# Patient Record
Sex: MALE | Race: WHITE | NOT HISPANIC OR LATINO | Employment: OTHER | URBAN - METROPOLITAN AREA
[De-identification: names, ages, dates, MRNs, and addresses within clinical notes are randomized per-mention and may not be internally consistent; named-entity substitution may affect disease eponyms.]

---

## 2017-01-16 ENCOUNTER — TELEPHONE (OUTPATIENT)
Dept: SLEEP MEDICINE | Facility: CLINIC | Age: 67
End: 2017-01-16

## 2017-01-16 NOTE — TELEPHONE ENCOUNTER
I called Mr. Mackey again and reviewed his home sleep study. His AHI was 4/hr. His supine AHI was 17.5/hr. I recommended he try positional restriction and I told him about options of Slumberbump or tennis ball t-shirt. He was advised to get back in touch with me if he is still feeling tired or if his wife is still observing pauses in breathing. We may consider doing an in lab PSG to see if he is having milder events that could not be scored on the HST.   He says he is not as tired as he was when he was last here, but he still has some days where he is tired.  Bennett Goltz, PA-C

## 2017-01-25 ENCOUNTER — TRANSFERRED RECORDS (OUTPATIENT)
Dept: HEALTH INFORMATION MANAGEMENT | Facility: CLINIC | Age: 67
End: 2017-01-25

## 2017-01-25 LAB
ALT SERPL-CCNC: 38 U/L (ref 12–55)
AST SERPL-CCNC: 23 U/L (ref 0–37)
CHOLEST SERPL-MCNC: 187 MG/DL (ref 0–199)
CREAT SERPL-MCNC: 1 MG/DL (ref 0.5–1.3)
GLUCOSE SERPL-MCNC: 120 MG/DL (ref 80–115)
HDLC SERPL-MCNC: 43 MG/DL (ref 40–100)
LDLC SERPL CALC-MCNC: 76 MG/DL
NONHDLC SERPL-MCNC: ABNORMAL MG/DL
POTASSIUM SERPL-SCNC: 4.6 MMOL/L (ref 3.5–5.1)
TRIGL SERPL-MCNC: 328 MG/DL (ref 0–149)
TSH SERPL-ACNC: 10.3 MCU/ML (ref 0.36–3.74)

## 2017-02-10 ENCOUNTER — OFFICE VISIT (OUTPATIENT)
Dept: FAMILY MEDICINE | Facility: CLINIC | Age: 67
End: 2017-02-10
Payer: COMMERCIAL

## 2017-02-10 VITALS
DIASTOLIC BLOOD PRESSURE: 74 MMHG | BODY MASS INDEX: 32.44 KG/M2 | HEIGHT: 69 IN | HEART RATE: 82 BPM | RESPIRATION RATE: 18 BRPM | WEIGHT: 219 LBS | SYSTOLIC BLOOD PRESSURE: 135 MMHG | OXYGEN SATURATION: 95 % | TEMPERATURE: 98 F

## 2017-02-10 DIAGNOSIS — B34.9 VIRAL ILLNESS: Primary | ICD-10-CM

## 2017-02-10 PROCEDURE — 99212 OFFICE O/P EST SF 10 MIN: CPT | Performed by: NURSE PRACTITIONER

## 2017-02-10 RX ORDER — MIRABEGRON 25 MG/1
25 TABLET, FILM COATED, EXTENDED RELEASE ORAL DAILY
COMMUNITY
Start: 2016-12-29 | End: 2017-09-13

## 2017-02-10 NOTE — PROGRESS NOTES
HPI    SUBJECTIVE:                                                    Dionte Mackey is a 66 year old male who presents to clinic today for the following health issues:      Got a t-tube 1 year ago and had ear reconstruction surgery about 4 months ago   Sunday had a flight and felt like a cold was coming on but it improved the following days where he felt good yesterday and the day before. Then last night started to get more sinus and ear congestion   Mild occasional cough  Nasal drainage   No fevers, body aches       Past Medical History   Diagnosis Date     BPH (benign prostatic hyperplasia)      Depression      Hypertension      Non morbid obesity due to excess calories 11/9/2016     Recurrent major depressive disorder, in full remission (H) 11/9/2016     Uncomplicated asthma      Past Surgical History   Procedure Laterality Date     Back surgery       cervical fusion,neck fx repair     Genitourinary surgery       TURP IN 2010     Ent surgery       SINUS SURGERY X 3,SEPTOPLASTY,TONSILLECTOMY     Tonsillectomy       Foot surgery Right      Colonoscopy       Cystoscopy, transurethral resection (tur) prostate, combined N/A 4/3/2015     Procedure: COMBINED CYSTOSCOPY, TRANSURETHRAL RESECTION (TUR) PROSTATE;  Surgeon: Bakari Barrios MD;  Location:  OR     Social History   Substance Use Topics     Smoking status: Never Smoker     Smokeless tobacco: Never Used     Alcohol use 1.8 - 2.4 oz/week     3 - 4 Standard drinks or equivalent per week      Comment: 3 drinks on weekend days     Current Outpatient Prescriptions   Medication Sig Dispense Refill     MYRBETRIQ 25 MG 24 hr tablet Take 25 mg by mouth daily       lisinopril (PRINIVIL,ZESTRIL) 30 MG tablet Take 1 tablet (30 mg) by mouth daily 90 tablet 3     sertraline (ZOLOFT) 25 MG tablet Take 4 tablets (100 mg) by mouth 2 times daily 180 tablet 3     hydrochlorothiazide (HYDRODIURIL) 25 MG tablet Take 1 tablet (25 mg) by mouth daily 90 tablet 3     buPROPion  "(WELLBUTRIN XL) 150 MG 24 hr tablet Take 150 mg by mouth every morning       albuterol (PROAIR HFA, PROVENTIL HFA, VENTOLIN HFA) 108 (90 BASE) MCG/ACT inhaler Inhale 2 puffs into the lungs every 6 hours       mometasone-formoterol (DULERA) 200-5 MCG/ACT oral inhaler Inhale 2 puffs into the lungs 2 times daily       budesonide (PULMICORT) 0.5 MG/2ML nebulizer solution Take 0.5 mg by nebulization daily       mupirocin (BACTROBAN) 2 % ointment        Sildenafil Citrate (VIAGRA PO) Take 100 mg by mouth as needed       Allergies   Allergen Reactions     Ciprofloxacin Shortness Of Breath, Itching and Difficulty breathing     Moxifloxacin Hives       Reviewed PMH, med list and allergies.      ROS  Detailed as above       /74 mmHg  Pulse 82  Temp(Src) 98  F (36.7  C) (Oral)  Resp 18  Ht 5' 9\" (1.753 m)  Wt 219 lb (99.338 kg)  BMI 32.33 kg/m2  SpO2 95%      Physical Exam   Constitutional: He is well-developed, well-nourished, and in no distress.   HENT:   Head: Normocephalic.   Right Ear: Tympanic membrane, external ear and ear canal normal.   Left Ear: Tympanic membrane, external ear and ear canal normal.   Mouth/Throat: Oropharynx is clear and moist. No oropharyngeal exudate.   Eyes: Conjunctivae are normal.   Neck: Normal range of motion.   Pulmonary/Chest: Effort normal and breath sounds normal. No respiratory distress.   Lymphadenopathy:     He has no cervical adenopathy.   Neurological: He is alert.   Skin: Skin is warm and dry.   Psychiatric: Mood and affect normal.   Vitals reviewed.      Assessment and Plan:       ICD-10-CM    1. Viral illness B34.9        URI symptoms for less than 24 hours  Will continue to watch and wait   Symptomatic treatments   Call or rtc in 2-3 weeks with no improvement       GIACOMO Dent, CNP  [facility]    "

## 2017-02-10 NOTE — NURSING NOTE
"Chief Complaint   Patient presents with     Cough     Otalgia     Sinus Problem       Initial /74 mmHg  Pulse 82  Temp(Src) 98  F (36.7  C) (Oral)  Resp 18  Ht 5' 9\" (1.753 m)  Wt 219 lb (99.338 kg)  BMI 32.33 kg/m2  SpO2 95% Estimated body mass index is 32.33 kg/(m^2) as calculated from the following:    Height as of this encounter: 5' 9\" (1.753 m).    Weight as of this encounter: 219 lb (99.338 kg).  BP taken with regular cuff.  Medication Reconciliation: complete   MYA Childress CMA February 10, 2017 12:06 PM    "

## 2017-02-10 NOTE — MR AVS SNAPSHOT
"              After Visit Summary   2/10/2017    Dionte Mackey    MRN: [id number]           Patient Information     Date Of Birth          1950        Visit Information        Provider Department      2/10/2017 12:00 PM Annmarie Silva APRN CNP [facility]        Today's Diagnoses     Viral illness    -  1       Follow-ups after your visit        Who to contact     If you have questions or need follow up information about today's clinic visit or your schedule please contact [facility] directly at [phone].  Normal or non-critical lab and imaging results will be communicated to you by [employer] Inchart, letter or phone within 4 business days after the clinic has received the results. If you do not hear from us within 7 days, please contact the clinic through [employer] Inchart or phone. If you have a critical or abnormal lab result, we will notify you by phone as soon as possible.  Submit refill requests through [employer] or call your pharmacy and they will forward the refill request to us. Please allow 3 business days for your refill to be completed.          Additional Information About Your Visit        MyChart Information     [employer] lets you send messages to your doctor, view your test results, renew your prescriptions, schedule appointments and more. To sign up, go to [email or web address] . Click on \"Log in\" on the left side of the screen, which will take you to the Welcome page. Then click on \"Sign up Now\" on the right side of the page.     You will be asked to enter the access code listed below, as well as some personal information. Please follow the directions to create your username and password.     Your access code is: [email or web address]  Expires: 2017 10:13 AM     Your access code will  in 90 days. If you need help or a new code, please call your [facility] or [phone].        Care EveryWhere ID     This is your Care EveryWhere ID. This could be used by other " "organizations to access your [place] medical records  [id number]        Your Vitals Were     Pulse Temperature Respirations Height Pulse Oximetry BMI (Body Mass Index)    82 98  F (36.7  C) (Oral) 18 5' 9\" (1.753 m) 95% 32.34 kg/m2       Blood Pressure from Last 3 Encounters:   02/10/17 135/74   11/18/16 124/72   11/09/16 127/81    Weight from Last 3 Encounters:   02/10/17 219 lb (99.3 kg)   11/18/16 218 lb 6.4 oz (99.1 kg)   11/09/16 220 lb (99.8 kg)              Today, you had the following     No orders found for display       Primary Care Provider Office Phone # Fax #    Bakari Barrios [phone]       [facility] [address] [facility] [address]  [facility] [address]        Thank you!     Thank you for choosing [facility]  for your care. Our goal is always to provide you with excellent care. Hearing back from our patients is one way we can continue to improve our services. Please take a few minutes to complete the written survey that you may receive in the mail after your visit with us. Thank you!             Your Updated Medication List - Protect others around you: Learn how to safely use, store and throw away your medicines at www.disposemymeds.org.          This list is accurate as of: 2/10/17 11:59 PM.  Always use your most recent med list.                   Brand Name Dispense Instructions for use    albuterol 108 (90 BASE) MCG/ACT Inhaler    PROAIR HFA/PROVENTIL HFA/VENTOLIN HFA     Inhale 2 puffs into the lungs every 6 hours       budesonide 0.5 MG/2ML neb solution    PULMICORT     Take 0.5 mg by nebulization daily       buPROPion 150 MG 24 hr tablet    WELLBUTRIN XL     Take 150 mg by mouth every morning       hydrochlorothiazide 25 MG tablet    HYDRODIURIL    90 tablet    Take 1 tablet (25 mg) by mouth daily       lisinopril 30 MG tablet    PRINIVIL,ZESTRIL    90 tablet    Take 1 tablet (30 mg) by mouth daily       mometasone-formoterol 200-5 MCG/ACT oral inhaler    " DULERA     Inhale 2 puffs into the lungs 2 times daily       mupirocin 2 % ointment    BACTROBAN         MYRBETRIQ 25 MG 24 hr tablet   Generic drug:  mirabegron      Take 25 mg by mouth daily       sertraline 25 MG tablet    ZOLOFT    180 tablet    Take 4 tablets (100 mg) by mouth 2 times daily       VIAGRA PO      Take 100 mg by mouth as needed

## 2017-02-11 ASSESSMENT — ASTHMA QUESTIONNAIRES: ACT_TOTALSCORE: 22

## 2017-02-22 ENCOUNTER — OFFICE VISIT (OUTPATIENT)
Dept: FAMILY MEDICINE | Facility: CLINIC | Age: 67
End: 2017-02-22
Payer: COMMERCIAL

## 2017-02-22 VITALS
HEIGHT: 69 IN | RESPIRATION RATE: 22 BRPM | HEART RATE: 92 BPM | DIASTOLIC BLOOD PRESSURE: 79 MMHG | WEIGHT: 214 LBS | OXYGEN SATURATION: 92 % | BODY MASS INDEX: 31.7 KG/M2 | SYSTOLIC BLOOD PRESSURE: 144 MMHG | TEMPERATURE: 98.3 F

## 2017-02-22 DIAGNOSIS — J06.9 UPPER RESPIRATORY TRACT INFECTION, UNSPECIFIED TYPE: Primary | ICD-10-CM

## 2017-02-22 PROCEDURE — 99212 OFFICE O/P EST SF 10 MIN: CPT | Performed by: NURSE PRACTITIONER

## 2017-02-22 RX ORDER — AZITHROMYCIN 250 MG/1
TABLET, FILM COATED ORAL
COMMUNITY
Start: 2017-02-20 | End: 2017-09-13

## 2017-02-22 RX ORDER — BENZONATATE 200 MG/1
200 CAPSULE ORAL
COMMUNITY
Start: 2017-02-20 | End: 2017-09-13

## 2017-02-22 RX ORDER — CEFUROXIME AXETIL 500 MG/1
500 TABLET ORAL
COMMUNITY
Start: 2017-02-20 | End: 2017-03-02

## 2017-02-22 RX ORDER — HYDROCODONE BITARTRATE AND ACETAMINOPHEN 5; 325 MG/1; MG/1
1 TABLET ORAL
COMMUNITY
Start: 2017-02-18 | End: 2017-09-13

## 2017-02-22 RX ORDER — PREDNISONE 20 MG/1
40 TABLET ORAL
COMMUNITY
Start: 2017-02-18 | End: 2017-02-23

## 2017-02-22 NOTE — PROGRESS NOTES
HPI    SUBJECTIVE:                                                    Dionte Mackey is a 66 year old male who presents to clinic today for the following health issues:    RESPIRATORY SYMPTOMS      Duration: 2+ weeks    Description  nasal congestion, cough, wheezing and SOB    Severity: moderate    Accompanying signs and symptoms: Body aches; No fever; Chest pain with coughing    History (predisposing factors):  asthma    Precipitating or alleviating factors: None    Therapies tried and outcome:  Treated for Pneumonia       Last neb today at 10am   Went to ENT   2/20 started zpak, just a couple days ago   Finishing prednisone tomorrow   nonproductive  Taking tessalon with some improvement       Past Medical History   Diagnosis Date     BPH (benign prostatic hyperplasia)      Depression      Hypertension      Non morbid obesity due to excess calories 11/9/2016     Recurrent major depressive disorder, in full remission (H) 11/9/2016     Uncomplicated asthma      Past Surgical History   Procedure Laterality Date     Back surgery       cervical fusion,neck fx repair     Genitourinary surgery       TURP IN 2010     Ent surgery       SINUS SURGERY X 3,SEPTOPLASTY,TONSILLECTOMY     Tonsillectomy       Foot surgery Right      Colonoscopy       Cystoscopy, transurethral resection (tur) prostate, combined N/A 4/3/2015     Procedure: COMBINED CYSTOSCOPY, TRANSURETHRAL RESECTION (TUR) PROSTATE;  Surgeon: Bakari Barrios MD;  Location:  OR     Social History   Substance Use Topics     Smoking status: Never Smoker     Smokeless tobacco: Never Used     Alcohol use 1.8 - 2.4 oz/week     3 - 4 Standard drinks or equivalent per week      Comment: 3 drinks on weekend days     Current Outpatient Prescriptions   Medication Sig Dispense Refill     azithromycin (ZITHROMAX) 250 MG tablet Take one tablet daily for 4 days. First dose of 500 mg given in emergency department.       cefUROXime (CEFTIN) 500 MG tablet Take 500 mg by mouth    "    benzonatate (TESSALON) 200 MG capsule Take 200 mg by mouth       HYDROcodone-acetaminophen (NORCO) 5-325 MG per tablet Take 1 tablet by mouth       MYRBETRIQ 25 MG 24 hr tablet Take 25 mg by mouth daily       lisinopril (PRINIVIL,ZESTRIL) 30 MG tablet Take 1 tablet (30 mg) by mouth daily 90 tablet 3     sertraline (ZOLOFT) 25 MG tablet Take 4 tablets (100 mg) by mouth 2 times daily 180 tablet 3     hydrochlorothiazide (HYDRODIURIL) 25 MG tablet Take 1 tablet (25 mg) by mouth daily 90 tablet 3     buPROPion (WELLBUTRIN XL) 150 MG 24 hr tablet Take 150 mg by mouth every morning       albuterol (PROAIR HFA, PROVENTIL HFA, VENTOLIN HFA) 108 (90 BASE) MCG/ACT inhaler Inhale 2 puffs into the lungs every 6 hours       mometasone-formoterol (DULERA) 200-5 MCG/ACT oral inhaler Inhale 2 puffs into the lungs 2 times daily       budesonide (PULMICORT) 0.5 MG/2ML nebulizer solution Take 0.5 mg by nebulization daily       mupirocin (BACTROBAN) 2 % ointment        Sildenafil Citrate (VIAGRA PO) Take 100 mg by mouth as needed       Allergies   Allergen Reactions     Ciprofloxacin Shortness Of Breath, Itching and Difficulty breathing     Moxifloxacin Hives       Reviewed PMH, med list and allergies.      ROS  Detailed as above       /79 (BP Location: Right arm, Patient Position: Right side, Cuff Size: Adult Large)  Pulse 92  Temp 98.3  F (36.8  C) (Tympanic)  Resp 22  Ht 5' 9\" (1.753 m)  Wt 214 lb (97.1 kg)  SpO2 92%  BMI 31.6 kg/m2      Physical Exam   Constitutional: He is well-developed, well-nourished, and in no distress.   HENT:   Head: Normocephalic.   Right Ear: Tympanic membrane, external ear and ear canal normal.   Left Ear: Tympanic membrane, external ear and ear canal normal.   Mouth/Throat: Oropharynx is clear and moist. No oropharyngeal exudate.   Eyes: Conjunctivae are normal.   Neck: Normal range of motion.   Cardiovascular: Normal rate, regular rhythm and normal heart sounds.    No murmur " heard.  Pulmonary/Chest: Effort normal and breath sounds normal. No respiratory distress.   Lymphadenopathy:     He has no cervical adenopathy.   Neurological: He is alert.   Skin: Skin is warm and dry.   Psychiatric: Mood and affect normal.   Vitals reviewed.        Assessment and Plan:       ICD-10-CM    1. Upper respiratory tract infection, unspecified type J06.9        Already on abx and prednisone  Will not change his treatment   Instructed on Symptomatic treatments   Told this will likely continue to improve slowly and last possibly another couple weeks   Call or rtc with worsening symptoms       GIACOMO Dent, CNP  [facility]

## 2017-02-22 NOTE — NURSING NOTE
"Chief Complaint   Patient presents with     URI       Initial /79 (BP Location: Right arm, Patient Position: Right side, Cuff Size: Adult Large)  Pulse 92  Temp 98.3  F (36.8  C) (Tympanic)  Resp 22  Ht 5' 9\" (1.753 m)  Wt 214 lb (97.1 kg)  SpO2 92%  BMI 31.6 kg/m2 Estimated body mass index is 31.6 kg/(m^2) as calculated from the following:    Height as of this encounter: 5' 9\" (1.753 m).    Weight as of this encounter: 214 lb (97.1 kg).  Medication Reconciliation: complete   Zahira Rodriguez CMA (AAMA)      "

## 2017-02-22 NOTE — MR AVS SNAPSHOT
"              After Visit Summary   2017    Dionte Mackey    MRN: [id number]           Patient Information     Date Of Birth          1950        Visit Information        Provider Department      2017 1:00 PM Annmarie Silva APRN CNP [facility]        Today's Diagnoses     Upper respiratory tract infection, unspecified type    -  1       Follow-ups after your visit        Who to contact     If you have questions or need follow up information about today's clinic visit or your schedule please contact [facility] directly at [phone].  Normal or non-critical lab and imaging results will be communicated to you by [employer] TV[employer], letter or phone within 4 business days after the clinic has received the results. If you do not hear from us within 7 days, please contact the clinic through [employer] TV[employer] or phone. If you have a critical or abnormal lab result, we will notify you by phone as soon as possible.  Submit refill requests through [employer] or call your pharmacy and they will forward the refill request to us. Please allow 3 business days for your refill to be completed.          Additional Information About Your Visit        MyChart Information     [employer] lets you send messages to your doctor, view your test results, renew your prescriptions, schedule appointments and more. To sign up, go to [email or web address] . Click on \"Log in\" on the left side of the screen, which will take you to the Welcome page. Then click on \"Sign up Now\" on the right side of the page.     You will be asked to enter the access code listed below, as well as some personal information. Please follow the directions to create your username and password.     Your access code is: [email or web address]  Expires: 2017 10:13 AM     Your access code will  in 90 days. If you need help or a new code, please call your [facility] or [phone].        Care EveryWhere ID     This is your Care EveryWhere ID. " "This could be used by other organizations to access your [place] medical records  [id number]        Your Vitals Were     Pulse Temperature Respirations Height Pulse Oximetry BMI (Body Mass Index)    92 98.3  F (36.8  C) (Tympanic) 22 5' 9\" (1.753 m) 92% 31.6 kg/m2       Blood Pressure from Last 3 Encounters:   02/22/17 144/79   02/10/17 135/74   11/18/16 124/72    Weight from Last 3 Encounters:   02/22/17 214 lb (97.1 kg)   02/10/17 219 lb (99.3 kg)   11/18/16 218 lb 6.4 oz (99.1 kg)              Today, you had the following     No orders found for display       Primary Care Provider Office Phone # Fax #    Bakari Barrios [phone]       [facility] [address] [facility] [address] [address]        Thank you!     Thank you for choosing [facility]  for your care. Our goal is always to provide you with excellent care. Hearing back from our patients is one way we can continue to improve our services. Please take a few minutes to complete the written survey that you may receive in the mail after your visit with us. Thank you!             Your Updated Medication List - Protect others around you: Learn how to safely use, store and throw away your medicines at www.disposemymeds.org.          This list is accurate as of: 2/22/17 11:59 PM.  Always use your most recent med list.                   Brand Name Dispense Instructions for use    albuterol 108 (90 BASE) MCG/ACT Inhaler    PROAIR HFA/PROVENTIL HFA/VENTOLIN HFA     Inhale 2 puffs into the lungs every 6 hours       azithromycin 250 MG tablet    ZITHROMAX     Take one tablet daily for 4 days. First dose of 500 mg given in emergency department.       benzonatate 200 MG capsule    TESSALON     Take 200 mg by mouth       budesonide 0.5 MG/2ML neb solution    PULMICORT     Take 0.5 mg by nebulization daily       buPROPion 150 MG 24 hr tablet    WELLBUTRIN XL     Take 150 mg by mouth every morning       cefUROXime 500 MG " tablet    CEFTIN     Take 500 mg by mouth       hydrochlorothiazide 25 MG tablet    HYDRODIURIL    90 tablet    Take 1 tablet (25 mg) by mouth daily       HYDROcodone-acetaminophen 5-325 MG per tablet    NORCO     Take 1 tablet by mouth       lisinopril 30 MG tablet    PRINIVIL,ZESTRIL    90 tablet    Take 1 tablet (30 mg) by mouth daily       mometasone-formoterol 200-5 MCG/ACT oral inhaler    DULERA     Inhale 2 puffs into the lungs 2 times daily       mupirocin 2 % ointment    BACTROBAN         MYRBETRIQ 25 MG 24 hr tablet   Generic drug:  mirabegron      Take 25 mg by mouth daily       predniSONE 20 MG tablet    DELTASONE     Take 40 mg by mouth       sertraline 25 MG tablet    ZOLOFT    180 tablet    Take 4 tablets (100 mg) by mouth 2 times daily       VIAGRA PO      Take 100 mg by mouth as needed

## 2017-09-06 ENCOUNTER — TRANSFERRED RECORDS (OUTPATIENT)
Dept: HEALTH INFORMATION MANAGEMENT | Facility: CLINIC | Age: 67
End: 2017-09-06

## 2017-09-12 NOTE — PATIENT INSTRUCTIONS
Preventive Health Recommendations:       Male Ages 65 and over    Yearly exam:             See your health care provider every year in order to  o   Review health changes.   o   Discuss preventive care.    o   Review your medicines if your doctor has prescribed any.    Talk with your health care provider about whether you should have a test to screen for prostate cancer (PSA).    Every 3 years, have a diabetes test (fasting glucose). If you are at risk for diabetes, you should have this test more often.    Every 5 years, have a cholesterol test. Have this test more often if you are at risk for high cholesterol or heart disease.     Every 10 years, have a colonoscopy. Or, have a yearly FIT test (stool test). These exams will check for colon cancer.    Talk to with your health care provider about screening for Abdominal Aortic Aneurysm if you have a family history of AAA or have a history of smoking.  Shots:     Get a flu shot each year.     Get a tetanus shot every 10 years.     Talk to your doctor about your pneumonia vaccines. There are now two you should receive - Pneumovax (PPSV 23) and Prevnar (PCV 13).    Talk to your doctor about a shingles vaccine.     Talk to your doctor about the hepatitis B vaccine.  Nutrition:     Eat at least 5 servings of fruits and vegetables each day.     Eat whole-grain bread, whole-wheat pasta and brown rice instead of white grains and rice.     Talk to your doctor about Calcium and Vitamin D.   Lifestyle    Exercise for at least 150 minutes a week (30 minutes a day, 5 days a week). This will help you control your weight and prevent disease.     Limit alcohol to one drink per day.     No smoking.     Wear sunscreen to prevent skin cancer.     See your dentist every six months for an exam and cleaning.     See your eye doctor every 1 to 2 years to screen for conditions such as glaucoma, macular degeneration and cataracts.  Preventive Health Recommendations:       Male Ages 65 and  over    Yearly exam:             See your health care provider every year in order to  o   Review health changes.   o   Discuss preventive care.    o   Review your medicines if your doctor has prescribed any.  Talk with your health care provider about whether you should have a test to screen for prostate cancer (PSA).  Every 3 years, have a diabetes test (fasting glucose). If you are at risk for diabetes, you should have this test more often.  Every 5 years, have a cholesterol test. Have this test more often if you are at risk for high cholesterol or heart disease.   Every 10 years, have a colonoscopy. Or, have a yearly FIT test (stool test). These exams will check for colon cancer.  Talk to with your health care provider about screening for Abdominal Aortic Aneurysm if you have a family history of AAA or have a history of smoking.  Shots:   Get a flu shot each year.   Get a tetanus shot every 10 years.   Talk to your doctor about your pneumonia vaccines. There are now two you should receive - Pneumovax (PPSV 23) and Prevnar (PCV 13).  Talk to your doctor about a shingles vaccine.   Talk to your doctor about the hepatitis B vaccine.  Nutrition:   Eat at least 5 servings of fruits and vegetables each day.   Eat whole-grain bread, whole-wheat pasta and brown rice instead of white grains and rice.   Talk to your doctor about Calcium and Vitamin D.   Lifestyle  Exercise for at least 150 minutes a week (30 minutes a day, 5 days a week). This will help you control your weight and prevent disease.   Limit alcohol to one drink per day.   No smoking.   Wear sunscreen to prevent skin cancer.   See your dentist every six months for an exam and cleaning.   See your eye doctor every 1 to 2 years to screen for conditions such as glaucoma, macular degeneration and cataracts.

## 2017-09-12 NOTE — PROGRESS NOTES
"  SUBJECTIVE:   Dionte Mackey is a 67 year old male who presents for Preventive Visit.  {PVP to remind patient that this is not necessarily a physical exam; physical exam may or may not be done:156440::\"click delete button to remove this line now\"}  {PVP to inform patient that additional E&M charge may apply, if additional problems addressed:832994::\"click delete button to remove this line now\"}  Are you in the first 12 months of your Medicare Part B coverage?  {No Yes:716917::\"No\"}    Healthy Habits:    Do you get at least three servings of calcium containing foods daily (dairy, green leafy vegetables, etc.)? {YES/NO, DAIRY INTAKE:379533::\"yes\"}    Amount of exercise or daily activities, outside of work: {AMOUNT EXERCISE:648657}    Problems taking medications regularly {Yes /No default:302827::\"No\"}    Medication side effects: {Yes /No default.:973297::\"No\"}    Have you had an eye exam in the past two years? {YESNOBLANK:424331}    Do you see a dentist twice per year? {YESNOBLANK:373555}    Do you have sleep apnea, excessive snoring or daytime drowsiness?{YESNOBLANK:060043}    {AWV Cognitive Screenin}    {Outside tests to abstract? :742696}    {additional problems to add (Optional):549767}    Reviewed and updated as needed this visit by clinical staff         Reviewed and updated as needed this visit by Provider        Social History   Substance Use Topics     Smoking status: Never Smoker     Smokeless tobacco: Never Used     Alcohol use 1.8 - 2.4 oz/week     3 - 4 Standard drinks or equivalent per week      Comment: 3 drinks on weekend days       {ETOH AUDIT:184576}    Today's PHQ-2 Score:   PHQ-2 (  [employer]) 2016   Q1: Little interest or pleasure in doing things 0   Q2: Feeling down, depressed or hopeless 0   PHQ-2 Score 0     {PHQ-2 LOOK IN ASSESSMENTS (Optional) :996829}  Do you feel safe in your environment - {YES/NO/NA:226522}    Do you have a Health Care Directive?: {HEALTHCARE DIRECTIVE " "STATUS:966448}    Current providers sharing in care for this patient include:   Patient Care Team:  Romeo Fonseca MD as PCP - General (Internal Medicine)      Hearing impairment: {NO/YES:619935}    Ability to successfully perform activities of daily living: {YES/NO (MEDICARE):455522::\"Yes, no assistance needed\"}     Fall risk:  {Document Fall Risk in the Assessments Section of the Navigator:189233}    Home safety:  {IPPE SAFETY CONCERNS:212043::\"none identified\"}  {If any of the above assessments are answered yes, consider ordering appropriate referrals (Optional):408660::\"click delete button to remove this line now\"}    The following health maintenance items are reviewed in Epic and correct as of today:  Health Maintenance   Topic Date Due     ASTHMA ACTION PLAN Q1 YR  1955     HEPATITIS C SCREENING  1968     COLON CANCER SCREEN (SYSTEM ASSIGNED)  2000     ADVANCE DIRECTIVE PLANNING Q5 YRS  2005     AORTIC ANEURYSM SCREENING (SYSTEM ASSIGNED)  2015     PNEUMOCOCCAL (2 of 2 - PPSV23) 2017     ASTHMA CONTROL TEST Q6 MOS  08/10/2017     INFLUENZA VACCINE (SYSTEM ASSIGNED)  2017     FALL RISK ASSESSMENT  2017     LIPID SCREEN Q5 YR MALE (SYSTEM ASSIGNED)  2022     TETANUS IMMUNIZATION (SYSTEM ASSIGNED)  2023     {Chronicprobdata (Optional):125698}    {Decision Support (Optional):239476}    ROS:  {ROS COMP:195264}    OBJECTIVE:   There were no vitals taken for this visit. Estimated body mass index is 31.6 kg/(m^2) as calculated from the following:    Height as of 17: 5' 9\" (1.753 m).    Weight as of 17: 214 lb (97.1 kg).  EXAM:   {Exam :624541}    ASSESSMENT / PLAN:   {Diag Picklist:329564}    End of Life Planning:  Patient currently has an advanced directive: { :720114}    COUNSELING:  {Medicare Counselin}    {BP Counseling- Complete if BP >= 120/80  (Optional):442904}    Estimated body mass index is 31.6 kg/(m^2) as calculated from the " "following:    Height as of 2/22/17: 5' 9\" (1.753 m).    Weight as of 2/22/17: 214 lb (97.1 kg).  {Weight Management Plan -- Complete if patient has an abnormal BMI (Optional):056642}   reports that he has never smoked. He has never used smokeless tobacco.  {Tobacco Cessation -- Complete if patient is a smoker (Optional):780641}    Appropriate preventive services were discussed with this patient, including applicable screening as appropriate for cardiovascular disease, diabetes, osteopenia/osteoporosis, and glaucoma.  As appropriate for age/gender, discussed screening for colorectal cancer, prostate cancer, breast cancer, and cervical cancer. Checklist reviewing preventive services available has been given to the patient.    Reviewed patients plan of care and provided an AVS. The {CarePlan:818912} for Dionte meets the Care Plan requirement. This Care Plan has been established and reviewed with the {PATIENT, FAMILY MEMBER, CAREGIVER:601272}.    Counseling Resources:  ATP IV Guidelines  Pooled Cohorts Equation Calculator  Breast Cancer Risk Calculator  FRAX Risk Assessment  ICSI Preventive Guidelines  Dietary Guidelines for Americans, 2010  USDA's MyPlate  ASA Prophylaxis  Lung CA Screening    Romeo Fonseca MD  [facility]  "

## 2017-09-13 ENCOUNTER — OFFICE VISIT (OUTPATIENT)
Dept: FAMILY MEDICINE | Facility: CLINIC | Age: 67
End: 2017-09-13
Payer: COMMERCIAL

## 2017-09-13 VITALS
SYSTOLIC BLOOD PRESSURE: 117 MMHG | WEIGHT: 208 LBS | TEMPERATURE: 97.3 F | BODY MASS INDEX: 30.81 KG/M2 | OXYGEN SATURATION: 95 % | DIASTOLIC BLOOD PRESSURE: 67 MMHG | HEIGHT: 69 IN | HEART RATE: 75 BPM

## 2017-09-13 DIAGNOSIS — I10 BENIGN ESSENTIAL HYPERTENSION: ICD-10-CM

## 2017-09-13 DIAGNOSIS — Z23 NEED FOR PROPHYLACTIC VACCINATION AND INOCULATION AGAINST INFLUENZA: ICD-10-CM

## 2017-09-13 DIAGNOSIS — J45.40 MODERATE PERSISTENT ASTHMA WITHOUT COMPLICATION: ICD-10-CM

## 2017-09-13 DIAGNOSIS — Z12.11 SCREEN FOR COLON CANCER: ICD-10-CM

## 2017-09-13 DIAGNOSIS — Z12.5 SCREENING FOR PROSTATE CANCER: ICD-10-CM

## 2017-09-13 DIAGNOSIS — Z23 NEED FOR PROPHYLACTIC VACCINATION AGAINST STREPTOCOCCUS PNEUMONIAE (PNEUMOCOCCUS): ICD-10-CM

## 2017-09-13 DIAGNOSIS — E78.5 HYPERLIPIDEMIA LDL GOAL <130: ICD-10-CM

## 2017-09-13 DIAGNOSIS — F33.42 RECURRENT MAJOR DEPRESSIVE DISORDER, IN FULL REMISSION (H): ICD-10-CM

## 2017-09-13 DIAGNOSIS — Z00.00 ROUTINE GENERAL MEDICAL EXAMINATION AT A HEALTH CARE FACILITY: Primary | ICD-10-CM

## 2017-09-13 DIAGNOSIS — Z13.6 ENCOUNTER FOR ABDOMINAL AORTIC ANEURYSM (AAA) SCREENING: ICD-10-CM

## 2017-09-13 DIAGNOSIS — Z11.59 NEED FOR HEPATITIS C SCREENING TEST: ICD-10-CM

## 2017-09-13 DIAGNOSIS — E03.9 HYPOTHYROIDISM, UNSPECIFIED TYPE: ICD-10-CM

## 2017-09-13 DIAGNOSIS — N32.81 OAB (OVERACTIVE BLADDER): ICD-10-CM

## 2017-09-13 LAB
ALBUMIN SERPL-MCNC: 3.9 G/DL (ref 3.4–5)
ALP SERPL-CCNC: 84 U/L (ref 40–150)
ALT SERPL W P-5'-P-CCNC: 36 U/L (ref 0–70)
ANION GAP SERPL CALCULATED.3IONS-SCNC: 7 MMOL/L (ref 3–14)
AST SERPL W P-5'-P-CCNC: 22 U/L (ref 0–45)
BILIRUB SERPL-MCNC: 0.4 MG/DL (ref 0.2–1.3)
BUN SERPL-MCNC: 17 MG/DL (ref 7–30)
CALCIUM SERPL-MCNC: 9 MG/DL (ref 8.5–10.1)
CHLORIDE SERPL-SCNC: 105 MMOL/L (ref 94–109)
CHOLEST SERPL-MCNC: 179 MG/DL
CO2 SERPL-SCNC: 24 MMOL/L (ref 20–32)
CREAT SERPL-MCNC: 0.91 MG/DL (ref 0.66–1.25)
ERYTHROCYTE [DISTWIDTH] IN BLOOD BY AUTOMATED COUNT: 13.8 % (ref 10–15)
GFR SERPL CREATININE-BSD FRML MDRD: 83 ML/MIN/1.7M2
GLUCOSE SERPL-MCNC: 115 MG/DL (ref 70–99)
HCT VFR BLD AUTO: 40.3 % (ref 40–53)
HCV AB SERPL QL IA: NONREACTIVE
HDLC SERPL-MCNC: 35 MG/DL
HGB BLD-MCNC: 14 G/DL (ref 13.3–17.7)
LDLC SERPL CALC-MCNC: 82 MG/DL
MCH RBC QN AUTO: 30.9 PG (ref 26.5–33)
MCHC RBC AUTO-ENTMCNC: 34.7 G/DL (ref 31.5–36.5)
MCV RBC AUTO: 89 FL (ref 78–100)
NONHDLC SERPL-MCNC: 144 MG/DL
PLATELET # BLD AUTO: 226 10E9/L (ref 150–450)
POTASSIUM SERPL-SCNC: 4.1 MMOL/L (ref 3.4–5.3)
PROT SERPL-MCNC: 7.3 G/DL (ref 6.8–8.8)
PSA SERPL-ACNC: 3.4 UG/L (ref 0–4)
RBC # BLD AUTO: 4.53 10E12/L (ref 4.4–5.9)
SODIUM SERPL-SCNC: 136 MMOL/L (ref 133–144)
T4 FREE SERPL-MCNC: 0.84 NG/DL (ref 0.76–1.46)
TRIGL SERPL-MCNC: 308 MG/DL
TSH SERPL DL<=0.005 MIU/L-ACNC: 4.54 MU/L (ref 0.4–4)
WBC # BLD AUTO: 5.6 10E9/L (ref 4–11)

## 2017-09-13 PROCEDURE — 86803 HEPATITIS C AB TEST: CPT | Performed by: INTERNAL MEDICINE

## 2017-09-13 PROCEDURE — G0009 ADMIN PNEUMOCOCCAL VACCINE: HCPCS | Performed by: INTERNAL MEDICINE

## 2017-09-13 PROCEDURE — 84439 ASSAY OF FREE THYROXINE: CPT | Performed by: INTERNAL MEDICINE

## 2017-09-13 PROCEDURE — 80053 COMPREHEN METABOLIC PANEL: CPT | Performed by: INTERNAL MEDICINE

## 2017-09-13 PROCEDURE — G0103 PSA SCREENING: HCPCS | Performed by: INTERNAL MEDICINE

## 2017-09-13 PROCEDURE — 99213 OFFICE O/P EST LOW 20 MIN: CPT | Mod: 25 | Performed by: INTERNAL MEDICINE

## 2017-09-13 PROCEDURE — 84443 ASSAY THYROID STIM HORMONE: CPT | Performed by: INTERNAL MEDICINE

## 2017-09-13 PROCEDURE — 85027 COMPLETE CBC AUTOMATED: CPT | Performed by: INTERNAL MEDICINE

## 2017-09-13 PROCEDURE — 90732 PPSV23 VACC 2 YRS+ SUBQ/IM: CPT | Performed by: INTERNAL MEDICINE

## 2017-09-13 PROCEDURE — G0439 PPPS, SUBSEQ VISIT: HCPCS | Performed by: INTERNAL MEDICINE

## 2017-09-13 PROCEDURE — 80061 LIPID PANEL: CPT | Performed by: INTERNAL MEDICINE

## 2017-09-13 PROCEDURE — 36415 COLL VENOUS BLD VENIPUNCTURE: CPT | Performed by: INTERNAL MEDICINE

## 2017-09-13 RX ORDER — ALBUTEROL SULFATE 90 UG/1
2 AEROSOL, METERED RESPIRATORY (INHALATION) EVERY 6 HOURS
Qty: 1 INHALER | Refills: 11 | Status: SHIPPED | OUTPATIENT
Start: 2017-09-13 | End: 2019-01-25

## 2017-09-13 RX ORDER — BUPROPION HYDROCHLORIDE 150 MG/1
150 TABLET ORAL EVERY MORNING
Qty: 90 TABLET | Refills: 3 | Status: SHIPPED | OUTPATIENT
Start: 2017-09-13 | End: 2018-06-01

## 2017-09-13 RX ORDER — HYDROCHLOROTHIAZIDE 25 MG/1
25 TABLET ORAL DAILY
Qty: 90 TABLET | Refills: 3 | Status: SHIPPED | OUTPATIENT
Start: 2017-09-13 | End: 2018-06-01

## 2017-09-13 RX ORDER — BUDESONIDE 0.5 MG/2ML
0.5 INHALANT ORAL DAILY
Status: CANCELLED | OUTPATIENT
Start: 2017-09-13

## 2017-09-13 RX ORDER — SERTRALINE HYDROCHLORIDE 100 MG/1
100 TABLET, FILM COATED ORAL 2 TIMES DAILY
Qty: 180 TABLET | Refills: 3 | Status: SHIPPED | OUTPATIENT
Start: 2017-09-13 | End: 2018-06-01

## 2017-09-13 RX ORDER — MIRABEGRON 25 MG/1
25 TABLET, FILM COATED, EXTENDED RELEASE ORAL DAILY
Qty: 90 TABLET | Refills: 3 | Status: SHIPPED | OUTPATIENT
Start: 2017-09-13 | End: 2018-06-01

## 2017-09-13 RX ORDER — LISINOPRIL 30 MG/1
30 TABLET ORAL DAILY
Qty: 90 TABLET | Refills: 3 | Status: SHIPPED | OUTPATIENT
Start: 2017-09-13 | End: 2018-06-01

## 2017-09-13 NOTE — LETTER
My Asthma Action Plan  Name: Dionte Mackey   YOB: 1950  Date: 9/13/2017   My doctor: Romeo Fonseca MD   My clinic: [facility]        My Control Medicine: Mometasone + formoterol (Dulera) -  200/5 mcg twice daily  My Rescue Medicine: Albuterol (Proair/Ventolin/Proventil) inhaler 2 puffs every 4 hours as needed   My Asthma Severity: moderate persistent  Avoid your asthma triggers: exercise or sports and cold air               GREEN ZONE   Good Control    I feel good    No cough or wheeze    Can work, sleep and play without asthma symptoms       Take your asthma control medicine every day.     1. If exercise triggers your asthma, take your rescue medication    15 minutes before exercise or sports, and    During exercise if you have asthma symptoms  2. Spacer to use with inhaler: If you have a spacer, make sure to use it with your inhaler             YELLOW ZONE Getting Worse  I have ANY of these:    I do not feel good    Cough or wheeze    Chest feels tight    Wake up at night   1. Keep taking your Green Zone medications  2. Start taking your rescue medicine:    every 20 minutes for up to 1 hour. Then every 4 hours for 24-48 hours.  3. If you stay in the Yellow Zone for more than 12-24 hours, contact your doctor.  4. If you do not return to the Green Zone in 12-24 hours or you get worse, start taking your oral steroid medicine if prescribed by your provider.           RED ZONE Medical Alert - Get Help  I have ANY of these:    I feel awful    Medicine is not helping    Breathing getting harder    Trouble walking or talking    Nose opens wide to breathe       1. Take your rescue medicine NOW  2. If your provider has prescribed an oral steroid medicine, start taking it NOW  3. Call your doctor NOW  4. If you are still in the Red Zone after 20 minutes and you have not reached your doctor:    Take your rescue medicine again and    Call 911 or go to the emergency room right away    See your  regular doctor within 2 weeks of an Emergency Room or Urgent Care visit for follow-up treatment.        Electronically signed by: Romeo Fonseca, September 13, 2017    Annual Reminders:  Meet with Asthma Educator,  Flu Shot in the Fall, consider Pneumonia Vaccination for patients with asthma (aged 19 and older).    Pharmacy:    [id number] - [place] [place] [facility] [address] [facility]  [facility] - [address]  [facility] - [address]                    Asthma Triggers  How To Control Things That Make Your Asthma Worse    Triggers are things that make your asthma worse.  Look at the list below to help you find your triggers and what you can do about them.  You can help prevent asthma flare-ups by staying away from your triggers.      Trigger                                                          What you can do   Cigarette Smoke  Tobacco smoke can make asthma worse. Do not allow smoking in your home, car or around you.  Be sure no one smokes at a child s day care or school.  If you smoke, ask your health care provider for ways to help you quit.  Ask family members to quit too.  Ask your health care provider for a referral to Quit Plan to help you quit smoking, or call 5-124-037-PLAN.     Colds, Flu, Bronchitis  These are common triggers of asthma. Wash your hands often.  Don t touch your eyes, nose or mouth.  Get a flu shot every year.     Dust Mites  These are tiny bugs that live in cloth or carpet. They are too small to see. Wash sheets and blankets in hot water every week.   Encase pillows and mattress in dust mite proof covers.  Avoid having carpet if you can. If you have carpet, vacuum weekly.   Use a dust mask and HEPA vacuum.   Pollen and Outdoor Mold  Some people are allergic to trees, grass, or weed pollen, or molds. Try to keep your windows closed.  Limit time out doors when pollen count is high.   Ask you  health care provider about taking medicine during allergy season.     Animal Dander  Some people are allergic to skin flakes, urine or saliva from pets with fur or feathers. Keep pets with fur or feathers out of your home.    If you can t keep the pet outdoors, then keep the pet out of your bedroom.  Keep the bedroom door closed.  Keep pets off cloth furniture and away from stuffed toys.     Mice, Rats, and Cockroaches  Some people are allergic to the waste from these pests.   Cover food and garbage.  Clean up spills and food crumbs.  Store grease in the refrigerator.   Keep food out of the bedroom.   Indoor Mold  This can be a trigger if your home has high moisture. Fix leaking faucets, pipes, or other sources of water.   Clean moldy surfaces.  Dehumidify basement if it is damp and smelly.   Smoke, Strong Odors, and Sprays  These can reduce air quality. Stay away from strong odors and sprays, such as perfume, powder, hair spray, paints, smoke incense, paint, cleaning products, candles and new carpet.   Exercise or Sports  Some people with asthma have this trigger. Be active!  Ask your doctor about taking medicine before sports or exercise to prevent symptoms.    Warm up for 5-10 minutes before and after sports or exercise.     Other Triggers of Asthma  Cold air:  Cover your nose and mouth with a scarf.  Sometimes laughing or crying can be a trigger.  Some medicines and food can trigger asthma.

## 2017-09-13 NOTE — NURSING NOTE
"Chief Complaint   Patient presents with     Wellness Visit       Initial /67 (BP Location: Left arm, Patient Position: Chair, Cuff Size: Adult Regular)  Pulse 75  Temp 97.3  F (36.3  C) (Oral)  Ht 5' 9\" (1.753 m)  Wt 208 lb (94.3 kg)  SpO2 95%  BMI 30.72 kg/m2 Estimated body mass index is 30.72 kg/(m^2) as calculated from the following:    Height as of this encounter: 5' 9\" (1.753 m).    Weight as of this encounter: 208 lb (94.3 kg).  Medication Reconciliation: complete   Deborah Rivera MA  "

## 2017-09-13 NOTE — PROGRESS NOTES
SUBJECTIVE:   Dionte Mackey is a 67 year old male who presents for Preventive Visit.  Are you in the first 12 months of your Medicare Part B coverage?  No    Healthy Habits:    Do you get at least three servings of calcium containing foods daily (dairy, green leafy vegetables, etc.)? yes    Amount of exercise or daily activities, outside of work: 2 day(s) per week    Problems taking medications regularly No    Medication side effects: No    Have you had an eye exam in the past two years? yes    Do you see a dentist twice per year? yes    Do you have sleep apnea, excessive snoring or daytime drowsiness?no    COGNITIVE SCREEN  1) Repeat 3 items (Banana, Sunrise, Chair)    2) Clock draw: NORMAL  3) 3 item recall: Recalls 1 object   Results: NORMAL clock, 1-2 items recalled: COGNITIVE IMPAIRMENT LESS LIKELY    Mini-CogTM Copyright S Maria Isabel. Licensed by the author for use in [facility]; reprinted with permission (shyanne@[facility]). All rights reserved.        TSH was > 10 on work labs drawn in January of 2017  No symptoms constipation, no cold intolerance      Asthma Follow-Up    Was ACT completed today?    Yes    ACT Total Scores 9/13/2017   ACT TOTAL SCORE (Goal Greater than or Equal to 20) 23   In the past 12 months, how many times did you visit the emergency room for your asthma without being admitted to the hospital? 1   In the past 12 months, how many times were you hospitalized overnight because of your asthma? 0       Recent asthma triggers that patient is dealing with: None              Reviewed and updated as needed this visit by clinical staff  Tobacco  Allergies  Meds  Med Hx  Surg Hx  Fam Hx  Soc Hx        Reviewed and updated as needed this visit by Provider  Tobacco  Med Hx  Surg Hx  Fam Hx  Soc Hx       Social History   Substance Use Topics     Smoking status: Never Smoker     Smokeless tobacco: Never Used     Alcohol use 1.8 - 2.4 oz/week     3 - 4 Standard drinks or equivalent per  week      Comment: 3 drinks on weekend days       The patient does not drink >3 drinks per day nor >7 drinks per week.    Today's PHQ-2 Score:   PHQ-2 ( 1999 [employer]) 9/13/2017 11/9/2016   Q1: Little interest or pleasure in doing things 0 0   Q2: Feeling down, depressed or hopeless 0 0   PHQ-2 Score 0 0         Do you feel safe in your environment - Yes    Do you have a Health Care Directive?: Yes: Advance Directive has been received and scanned.    Current providers sharing in care for this patient include: Patient Care Team:  Romeo Fonseca MD as PCP - General (Internal Medicine)      Hearing impairment: Yes, wears hearing aids    Ability to successfully perform activities of daily living: Yes, no assistance needed     Fall risk:  Fallen 2 or more times in the past year?: No  Any fall with injury in the past year?: No      Home safety:  lack of grab bars in the bathroom      The following health maintenance items are reviewed in Epic and correct as of today:  Health Maintenance   Topic Date Due     ASTHMA ACTION PLAN Q1 YR  08/02/1955     HEPATITIS C SCREENING  08/02/1968     COLON CANCER SCREEN (SYSTEM ASSIGNED)  08/02/2000     ADVANCE DIRECTIVE PLANNING Q5 YRS  08/02/2005     AORTIC ANEURYSM SCREENING (SYSTEM ASSIGNED)  08/02/2015     PNEUMOCOCCAL (2 of 2 - PPSV23) 06/20/2017     ASTHMA CONTROL TEST Q6 MOS  08/10/2017     INFLUENZA VACCINE (SYSTEM ASSIGNED)  09/01/2017     FALL RISK ASSESSMENT  11/09/2017     LIPID SCREEN Q5 YR MALE (SYSTEM ASSIGNED)  01/25/2022     TETANUS IMMUNIZATION (SYSTEM ASSIGNED)  08/18/2023     Patient Active Problem List   Diagnosis     BPH (benign prostatic hyperplasia)     Hematuria     Benign essential hypertension     Seasonal allergic rhinitis     Moderate persistent asthma without complication     Vasculogenic erectile dysfunction     Herpes zoster without complication     Recurrent major depressive disorder, in full remission (H)     Non morbid obesity due to excess calories      Past Surgical History:   Procedure Laterality Date     BACK SURGERY      cervical fusion,neck fx repair     COLONOSCOPY       CYSTOSCOPY, TRANSURETHRAL RESECTION (TUR) PROSTATE, COMBINED N/A 4/3/2015    Procedure: COMBINED CYSTOSCOPY, TRANSURETHRAL RESECTION (TUR) PROSTATE;  Surgeon: Bakari Barrios MD;  Location: [facility] OR     ENT SURGERY      SINUS SURGERY X 3,SEPTOPLASTY,TONSILLECTOMY     FOOT SURGERY Right      GENITOURINARY SURGERY      TURP IN 2010     TONSILLECTOMY         Social History   Substance Use Topics     Smoking status: Never Smoker     Smokeless tobacco: Never Used     Alcohol use 1.8 - 2.4 oz/week     3 - 4 Standard drinks or equivalent per week      Comment: 3 drinks on weekend days     Family History   Problem Relation Age of Onset     Colon Cancer Father 70     Coronary Artery Disease No family hx of      CEREBROVASCULAR DISEASE No family hx of      DIABETES No family hx of          Current Outpatient Prescriptions   Medication Sig Dispense Refill     sertraline (ZOLOFT) 100 MG tablet Take 1 tablet (100 mg) by mouth 2 times daily 180 tablet 3     lisinopril (PRINIVIL,ZESTRIL) 30 MG tablet Take 1 tablet (30 mg) by mouth daily 90 tablet 3     hydrochlorothiazide (HYDRODIURIL) 25 MG tablet Take 1 tablet (25 mg) by mouth daily 90 tablet 3     mometasone-formoterol (DULERA) 200-5 MCG/ACT oral inhaler Inhale 2 puffs into the lungs 2 times daily 1 Inhaler 3     albuterol (PROAIR HFA/PROVENTIL HFA/VENTOLIN HFA) 108 (90 BASE) MCG/ACT Inhaler Inhale 2 puffs into the lungs every 6 hours 1 Inhaler 11     MYRBETRIQ 25 MG 24 hr tablet Take 1 tablet (25 mg) by mouth daily 90 tablet 3     buPROPion (WELLBUTRIN XL) 150 MG 24 hr tablet Take 1 tablet (150 mg) by mouth every morning 90 tablet 3     mupirocin (BACTROBAN) 2 % ointment        Sildenafil Citrate (VIAGRA PO) Take 100 mg by mouth as needed       [DISCONTINUED] sertraline (ZOLOFT) 100 MG tablet Take 1 tablet (100 mg) by mouth 2 times daily 180 tablet 2      "[DISCONTINUED] lisinopril (PRINIVIL,ZESTRIL) 30 MG tablet Take 1 tablet (30 mg) by mouth daily 90 tablet 3     [DISCONTINUED] hydrochlorothiazide (HYDRODIURIL) 25 MG tablet Take 1 tablet (25 mg) by mouth daily 90 tablet 3     [DISCONTINUED] buPROPion (WELLBUTRIN XL) 150 MG 24 hr tablet Take 150 mg by mouth every morning       budesonide (PULMICORT) 0.5 MG/2ML nebulizer solution Take 0.5 mg by nebulization daily       [DISCONTINUED] albuterol (PROAIR HFA, PROVENTIL HFA, VENTOLIN HFA) 108 (90 BASE) MCG/ACT inhaler Inhale 2 puffs into the lungs every 6 hours       [DISCONTINUED] mometasone-formoterol (DULERA) 200-5 MCG/ACT oral inhaler Inhale 2 puffs into the lungs 2 times daily       Allergies   Allergen Reactions     Ciprofloxacin Shortness Of Breath, Itching and Difficulty breathing     Moxifloxacin Hives             ROS:  Constitutional, HEENT, cardiovascular, pulmonary, GI, , musculoskeletal, neuro, skin, endocrine and psych systems are negative, except as otherwise noted.      OBJECTIVE:   /67 (BP Location: Left arm, Patient Position: Chair, Cuff Size: Adult Regular)  Pulse 75  Temp 97.3  F (36.3  C) (Oral)  Ht 5' 9\" (1.753 m)  Wt 208 lb (94.3 kg)  SpO2 95%  BMI 30.72 kg/m2 Estimated body mass index is 30.72 kg/(m^2) as calculated from the following:    Height as of this encounter: 5' 9\" (1.753 m).    Weight as of this encounter: 208 lb (94.3 kg).  EXAM:   GENERAL: healthy, alert and no distress  EYES: Eyes grossly normal to inspection, PERRL and conjunctivae and sclerae normal  HENT: ear canals and TM's normal, nose and mouth without ulcers or lesions  NECK: no adenopathy, no asymmetry, masses, or scars and thyroid normal to palpation  RESP: lungs clear to auscultation - no rales, rhonchi or wheezes  CV: regular rate and rhythm, normal S1 S2, no S3 or S4, no murmur, click or rub, no peripheral edema and peripheral pulses strong  ABDOMEN: soft, nontender, no hepatosplenomegaly, no masses and bowel " sounds normal  RECTAL: declined by patient due to recent exam by Dr. Barrios  MS: no gross musculoskeletal defects noted, no edema  SKIN: no suspicious lesions or rashes  NEURO: Normal strength and tone, mentation intact and speech normal  PSYCH: mentation appears normal, affect normal/bright    ASSESSMENT / PLAN:   1. Routine general medical examination at a health care facility      2. Recurrent major depressive disorder, in full remission (H)  Mood under good control  - sertraline (ZOLOFT) 100 MG tablet; Take 1 tablet (100 mg) by mouth 2 times daily  Dispense: 180 tablet; Refill: 3  - buPROPion (WELLBUTRIN XL) 150 MG 24 hr tablet; Take 1 tablet (150 mg) by mouth every morning  Dispense: 90 tablet; Refill: 3    3. Screen for colon cancer  [facility]    4. Need for hepatitis C screening test    - Hepatitis C Screen Reflex to HCV RNA Quant and Genotype    5. Need for prophylactic vaccination and inoculation against influenza  He will get this from work     6. Need for prophylactic vaccination against Streptococcus pneumoniae (pneumococcus)  P23 today     7. Benign essential hypertension  Well controlled   - lisinopril (PRINIVIL,ZESTRIL) 30 MG tablet; Take 1 tablet (30 mg) by mouth daily  Dispense: 90 tablet; Refill: 3  - hydrochlorothiazide (HYDRODIURIL) 25 MG tablet; Take 1 tablet (25 mg) by mouth daily  Dispense: 90 tablet; Refill: 3    8. Moderate persistent asthma without complication  Well controlled   - mometasone-formoterol (DULERA) 200-5 MCG/ACT oral inhaler; Inhale 2 puffs into the lungs 2 times daily  Dispense: 1 Inhaler; Refill: 3  - albuterol (PROAIR HFA/PROVENTIL HFA/VENTOLIN HFA) 108 (90 BASE) MCG/ACT Inhaler; Inhale 2 puffs into the lungs every 6 hours  Dispense: 1 Inhaler; Refill: 11    9. OAB (overactive bladder)  Continue below; he follows up regularly with Dr. Denis sheffield  - MYRBETRIQ 25 MG 24 hr tablet; Take 1 tablet (25 mg) by mouth daily  Dispense: 90 tablet; Refill: 3    10. Hyperlipidemia  "LDL goal <130  Recheck today; probably a candidate for statin therapy, but need to address possible thyroid disease first (see below)  - Lipid panel reflex to direct LDL  - Comprehensive metabolic panel  - CBC with platelets    11. Screening for prostate cancer    - PSA, screen    12. Hypothyroidism, unspecified type  Recheck this; treat if TSH remains > 10   - TSH with free T4 reflex    End of Life Planning:  Patient currently has an advanced directive: Yes.  Practitioner is supportive of decision.    COUNSELING:  Reviewed preventive health counseling, as reflected in patient instructions  Special attention given to:       Consider AAA screening for ages 65-75 and smoking history (scheduled exam)       Regular exercise       Healthy diet/nutrition       Immunizations    Vaccinated for: Pneumococcal 23           Hepatitis C screening       Colon cancer screening       Prostate cancer screening          Estimated body mass index is 30.72 kg/(m^2) as calculated from the following:    Height as of this encounter: 5' 9\" (1.753 m).    Weight as of this encounter: 208 lb (94.3 kg).  Weight management plan: Discussed healthy diet and exercise guidelines and patient will follow up in 12 months in clinic to re-evaluate.   reports that he has never smoked. He has never used smokeless tobacco.        Appropriate preventive services were discussed with this patient, including applicable screening as appropriate for cardiovascular disease, diabetes, osteopenia/osteoporosis, and glaucoma.  As appropriate for age/gender, discussed screening for colorectal cancer, prostate cancer, breast cancer, and cervical cancer. Checklist reviewing preventive services available has been given to the patient.    Reviewed patients plan of care and provided an AVS. The Basic Care Plan (routine screening as documented in Health Maintenance) for Dionte meets the Care Plan requirement. This Care Plan has been established and reviewed with the " Patient.    Counseling Resources:  ATP IV Guidelines  Pooled Cohorts Equation Calculator  Breast Cancer Risk Calculator  FRAX Risk Assessment  ICSI Preventive Guidelines  Dietary Guidelines for Americans, 2010  USDA's MyPlate  ASA Prophylaxis  Lung CA Screening    Romeo Fonseca MD  [facility]

## 2017-09-13 NOTE — MR AVS SNAPSHOT
After Visit Summary   9/13/2017    Dionte Mackey    MRN: [id number]           Patient Information     Date Of Birth          1950        Visit Information        Provider Department      9/13/2017 8:30 AM Roemo Fonseca MD [facility]        Today's Diagnoses     Moderate persistent asthma without complication    -  1    Routine general medical examination at a health care facility        Recurrent major depressive disorder, in full remission (H)        Screen for colon cancer        Need for hepatitis C screening test        Need for prophylactic vaccination and inoculation against influenza        Need for prophylactic vaccination against Streptococcus pneumoniae (pneumococcus)        Benign essential hypertension        OAB (overactive bladder)        Hyperlipidemia LDL goal <130        Screening for prostate cancer        Hypothyroidism, unspecified type        Encounter for abdominal aortic aneurysm (AAA) screening          Care Instructions      Preventive Health Recommendations:       Male Ages 65 and over    Yearly exam:             See your health care provider every year in order to  o   Review health changes.   o   Discuss preventive care.    o   Review your medicines if your doctor has prescribed any.    Talk with your health care provider about whether you should have a test to screen for prostate cancer (PSA).    Every 3 years, have a diabetes test (fasting glucose). If you are at risk for diabetes, you should have this test more often.    Every 5 years, have a cholesterol test. Have this test more often if you are at risk for high cholesterol or heart disease.     Every 10 years, have a colonoscopy. Or, have a yearly FIT test (stool test). These exams will check for colon cancer.    Talk to with your health care provider about screening for Abdominal Aortic Aneurysm if you have a family history of AAA or have a history of smoking.  Shots:     Get a flu shot each year.      Get a tetanus shot every 10 years.     Talk to your doctor about your pneumonia vaccines. There are now two you should receive - Pneumovax (PPSV 23) and Prevnar (PCV 13).    Talk to your doctor about a shingles vaccine.     Talk to your doctor about the hepatitis B vaccine.  Nutrition:     Eat at least 5 servings of fruits and vegetables each day.     Eat whole-grain bread, whole-wheat pasta and brown rice instead of white grains and rice.     Talk to your doctor about Calcium and Vitamin D.   Lifestyle    Exercise for at least 150 minutes a week (30 minutes a day, 5 days a week). This will help you control your weight and prevent disease.     Limit alcohol to one drink per day.     No smoking.     Wear sunscreen to prevent skin cancer.     See your dentist every six months for an exam and cleaning.     See your eye doctor every 1 to 2 years to screen for conditions such as glaucoma, macular degeneration and cataracts.  Preventive Health Recommendations:       Male Ages 65 and over    Yearly exam:             See your health care provider every year in order to  o   Review health changes.   o   Discuss preventive care.    o   Review your medicines if your doctor has prescribed any.  Talk with your health care provider about whether you should have a test to screen for prostate cancer (PSA).  Every 3 years, have a diabetes test (fasting glucose). If you are at risk for diabetes, you should have this test more often.  Every 5 years, have a cholesterol test. Have this test more often if you are at risk for high cholesterol or heart disease.   Every 10 years, have a colonoscopy. Or, have a yearly FIT test (stool test). These exams will check for colon cancer.  Talk to with your health care provider about screening for Abdominal Aortic Aneurysm if you have a family history of AAA or have a history of smoking.  Shots:   Get a flu shot each year.   Get a tetanus shot every 10 years.   Talk to your doctor about your  pneumonia vaccines. There are now two you should receive - Pneumovax (PPSV 23) and Prevnar (PCV 13).  Talk to your doctor about a shingles vaccine.   Talk to your doctor about the hepatitis B vaccine.  Nutrition:   Eat at least 5 servings of fruits and vegetables each day.   Eat whole-grain bread, whole-wheat pasta and brown rice instead of white grains and rice.   Talk to your doctor about Calcium and Vitamin D.   Lifestyle  Exercise for at least 150 minutes a week (30 minutes a day, 5 days a week). This will help you control your weight and prevent disease.   Limit alcohol to one drink per day.   No smoking.   Wear sunscreen to prevent skin cancer.   See your dentist every six months for an exam and cleaning.   See your eye doctor every 1 to 2 years to screen for conditions such as glaucoma, macular degeneration and cataracts.          Follow-ups after your visit        Follow-up notes from your care team     Return in about 1 year (around 9/13/2018) for Physical Exam.      Future tests that were ordered for you today     Open Future Orders        Priority Expected Expires Ordered    US Aorta Medicare AAA Screening Routine  9/13/2018 9/13/2017            Who to contact     If you have questions or need follow up information about today's clinic visit or your schedule please contact [facility] directly at [phone].  Normal or non-critical lab and imaging results will be communicated to you by [employer], letter or phone within 4 business days after the clinic has received the results. If you do not hear from us within 7 days, please contact the clinic through [employer] or phone. If you have a critical or abnormal lab result, we will notify you by phone as soon as possible.  Submit refill requests through [facility] or call your pharmacy and they will forward the refill request to us. Please allow 3 business days for your refill to be completed.          Additional Information About Your Visit        [facility]  "Information     [employer] lets you send messages to your doctor, view your test results, renew your prescriptions, schedule appointments and more. To sign up, go to [email or web address] . Click on \"Log in\" on the left side of the screen, which will take you to the Welcome page. Then click on \"Sign up Now\" on the right side of the page.     You will be asked to enter the access code listed below, as well as some personal information. Please follow the directions to create your username and password.     Your access code is: [email or web address]  Expires: 2017  1:15 PM     Your access code will  in 90 days. If you need help or a new code, please call your [facility] clinic or [phone].        Care EveryWhere ID     This is your Care EveryWhere ID. This could be used by other organizations to access your [facility] medical records  [id number]        Your Vitals Were     Pulse Temperature Height Pulse Oximetry BMI (Body Mass Index)       75 97.3  F (36.3  C) (Oral) 5' 9\" (1.753 m) 95% 30.72 kg/m2        Blood Pressure from Last 3 Encounters:   17 117/67   17 144/79   02/10/17 135/74    Weight from Last 3 Encounters:   17 208 lb (94.3 kg)   17 214 lb (97.1 kg)   02/10/17 219 lb (99.3 kg)              We Performed the Following     Asthma Action Plan (AAP)     CBC with platelets     Comprehensive metabolic panel     Hepatitis C Screen Reflex to HCV RNA Quant and Genotype     Lipid panel reflex to direct LDL     PSA, screen     TSH with free T4 reflex          Where to get your medicines      These medications were sent to [facility] - [place], [place] - [facility]  [address] [facility] [address]     Phone:  [phone]     albuterol 108 (90 BASE) MCG/ACT Inhaler    buPROPion 150 MG 24 hr tablet    hydrochlorothiazide 25 MG tablet    lisinopril 30 MG tablet    mometasone-formoterol 200-5 MCG/ACT oral inhaler    MYRBETRIQ 25 MG 24 hr tablet    sertraline " 100 MG tablet          Primary Care Provider Office Phone # Fax #    Romeo Fonseca [phone]       [place] [address] [facility] [address]  [facility] [address]        Equal Access to Services     JJ LOPEZ : Hadii marybeth ku hadannio Soomaali, waaxda luqadaha, qaybta kaalmada adeegyada, mariajose raffiin hayaan isabellatravis garcia jacqueline marquez. So [facility] [phone].    ATENCIÓN: Si habla español, tiene a san disposición servicios gratuitos de asistencia lingüística. Llame al [phone].    We comply with applicable federal civil rights laws and [place] laws. We do not discriminate on the basis of race, color, national origin, age, disability sex, sexual orientation or gender identity.            Thank you!     Thank you for choosing [facility]  for your care. Our goal is always to provide you with excellent care. Hearing back from our patients is one way we can continue to improve our services. Please take a few minutes to complete the written survey that you may receive in the mail after your visit with us. Thank you!             Your Updated Medication List - Protect others around you: Learn how to safely use, store and throw away your medicines at www.disposemymeds.org.          This list is accurate as of: 9/13/17  9:06 AM.  Always use your most recent med list.                   Brand Name Dispense Instructions for use Diagnosis    albuterol 108 (90 BASE) MCG/ACT Inhaler    PROAIR HFA/PROVENTIL HFA/VENTOLIN HFA    1 Inhaler    Inhale 2 puffs into the lungs every 6 hours    Moderate persistent asthma without complication       budesonide 0.5 MG/2ML neb solution    PULMICORT     Take 0.5 mg by nebulization daily        buPROPion 150 MG 24 hr tablet    WELLBUTRIN XL    90 tablet    Take 1 tablet (150 mg) by mouth every morning    Recurrent major depressive disorder, in full remission (H)       hydrochlorothiazide 25 MG tablet    HYDRODIURIL    90 tablet    Take 1 tablet (25 mg) by mouth  daily    Benign essential hypertension       lisinopril 30 MG tablet    PRINIVIL,ZESTRIL    90 tablet    Take 1 tablet (30 mg) by mouth daily    Benign essential hypertension       mometasone-formoterol 200-5 MCG/ACT oral inhaler    DULERA    1 Inhaler    Inhale 2 puffs into the lungs 2 times daily    Moderate persistent asthma without complication       mupirocin 2 % ointment    BACTROBAN          MYRBETRIQ 25 MG 24 hr tablet   Generic drug:  mirabegron     90 tablet    Take 1 tablet (25 mg) by mouth daily    OAB (overactive bladder)       sertraline 100 MG tablet    ZOLOFT    180 tablet    Take 1 tablet (100 mg) by mouth 2 times daily    Recurrent major depressive disorder, in full remission (H)       VIAGRA PO      Take 100 mg by mouth as needed

## 2017-09-13 NOTE — NURSING NOTE

## 2017-09-13 NOTE — LETTER
"[facility]  [address] [place] [address]. [facility]  [address]  [place]  [address]  Tel: [phone]    September 14, 2017    Dionte [place]  [address] [address] [place] [id number]        Dear Mr. Mackey,    The following letter pertains to your most recent diagnostic tests:    -Your hepatitis C screening test is negative.  You do not have hepatitis C.     -Your thyroid tests are improved and I do not think you need to start a thyroid medication based on these results    -Your prostate specific antigen (PSA) test result returned normal.     -Liver and gallbladder tests are normal for you. (ALT,AST, Alk phos, bilirubin), kidney function is normal for you (Creatinine, GFR), Sodium is normal, Potassium is normal for you, Calcium is normal for you.    -Glucose (blood sugar) is moderately elevated, but NOT in the diabetic range.    -Your total cholesterol is 179 which is at your goal of total cholesterol less than 200.    -Your triglycerides are 308 which are above your goal of triglycerides less than 150.    -Your HDL or \"good cholesterol\" is 35 which is below your goal of HDL cholesterol greater than 40.    -Your LDL cholesterol or \"bad cholesterol\" is 82 which is at your goal of LDL cholesterol less than <130.  Your LDL goal is based on your risk factors for artery disease.         Bottom line:  Your statistical risk for having a heart attack over the next 10 years is elevated  I estimate a 16.5 % chance of heart attack over the next 10 years based on these results.  If you took atorvastatin (Lipitor) regularly, you could bring that 10-year risk down to 10.4 %.  Therefore, according to recent guidelines, you are a candidate for taking that drug for preventive purposes.  You should be informed that a very small minority of people who take atorvastatin can develop muscle pain and weakness as a side effect from that drug.  If you experience those side effects, then stop the drug and contact me.  If you start the medication, " you should have a follow up fasting cholesterol panel and routine liver blood test after you have been taking the medication for 2 months.  You can schedule a lab appointment for that purpose.   Please contact me if you would like to get that medication started so we can phone in an prescritpion and arrange for appropriate follow up.    Otherwise your lab results look OK.  I do not think you need treatment for your thyroid based on these results.  Weight loss would help control your blood sugar.  Even loosing 5-10lbs could help this parameter a lot.      The 10-year ASCVD risk score (Emily NARESH Jr, et al., 2013) is: 16.5%    Values used to calculate the score:      Age: 67 years      Sex: Male      Is Non- : No      Diabetic: No      Tobacco smoker: No      Systolic Blood Pressure: 117 mmHg      Is BP treated: Yes      HDL Cholesterol: 35 mg/dL      Total Cholesterol: 179 mg/dL      Sincerely,    Romeo Fonseca MD/JOSE DAVID    Enclosure: Lab Results  Results for orders placed or performed in visit on 09/13/17   Hepatitis C Screen Reflex to HCV RNA Quant and Genotype   Result Value Ref Range    Hepatitis C Antibody Nonreactive NR^Nonreactive   TSH with free T4 reflex   Result Value Ref Range    TSH 4.54 (H) 0.40 - 4.00 mU/L   PSA, screen   Result Value Ref Range    PSA 3.40 0 - 4 ug/L   Lipid panel reflex to direct LDL   Result Value Ref Range    Cholesterol 179 <200 mg/dL    Triglycerides 308 (H) <150 mg/dL    HDL Cholesterol 35 (L) >39 mg/dL    LDL Cholesterol Calculated 82 <100 mg/dL    Non HDL Cholesterol 144 (H) <130 mg/dL   Comprehensive metabolic panel   Result Value Ref Range    Sodium 136 133 - 144 mmol/L    Potassium 4.1 3.4 - 5.3 mmol/L    Chloride 105 94 - 109 mmol/L    Carbon Dioxide 24 20 - 32 mmol/L    Anion Gap 7 3 - 14 mmol/L    Glucose 115 (H) 70 - 99 mg/dL    Urea Nitrogen 17 7 - 30 mg/dL    Creatinine 0.91 0.66 - 1.25 mg/dL    GFR Estimate 83 >60 mL/min/1.7m2    GFR Estimate If Black  >90 >60 mL/min/1.7m2    Calcium 9.0 8.5 - 10.1 mg/dL    Bilirubin Total 0.4 0.2 - 1.3 mg/dL    Albumin 3.9 3.4 - 5.0 g/dL    Protein Total 7.3 6.8 - 8.8 g/dL    Alkaline Phosphatase 84 40 - 150 U/L    ALT 36 0 - 70 U/L    AST 22 0 - 45 U/L   CBC with platelets   Result Value Ref Range    WBC 5.6 4.0 - 11.0 10e9/L    RBC Count 4.53 4.4 - 5.9 10e12/L    Hemoglobin 14.0 13.3 - 17.7 g/dL    Hematocrit 40.3 40.0 - 53.0 %    MCV 89 78 - 100 fl    MCH 30.9 26.5 - 33.0 pg    MCHC 34.7 31.5 - 36.5 g/dL    RDW 13.8 10.0 - 15.0 %    Platelet Count 226 150 - 450 10e9/L   T4 free   Result Value Ref Range    T4 Free 0.84 0.76 - 1.46 ng/dL

## 2017-09-14 ASSESSMENT — ASTHMA QUESTIONNAIRES: ACT_TOTALSCORE: 23

## 2017-09-14 NOTE — PROGRESS NOTES
"The following letter pertains to your most recent diagnostic tests:    -Your hepatitis C screening test is negative.  You do not have hepatitis C.     -Your thyroid tests are improved and I do not think you need to start a thyroid medication based on these results    -Your prostate specific antigen (PSA) test result returned normal.     -Liver and gallbladder tests are normal for you. (ALT,AST, Alk phos, bilirubin), kidney function is normal for you (Creatinine, GFR), Sodium is normal, Potassium is normal for you, Calcium is normal for you.    -Glucose (blood sugar) is moderately elevated, but NOT in the diabetic range.    -Your total cholesterol is 179 which is at your goal of total cholesterol less than 200.    -Your triglycerides are 308 which are above your goal of triglycerides less than 150.    -Your HDL or \"good cholesterol\" is 35 which is below your goal of HDL cholesterol greater than 40.    -Your LDL cholesterol or \"bad cholesterol\" is 82 which is at your goal of LDL cholesterol less than <130.  Your LDL goal is based on your risk factors for artery disease.         Bottom line:  Your statistical risk for having a heart attack over the next 10 years is elevated  I estimate a 16.5 % chance of heart attack over the next 10 years based on these results.  If you took atorvastatin (Lipitor) regularly, you could bring that 10-year risk down to 10.4 %.  Therefore, according to recent guidelines, you are a candidate for taking that drug for preventive purposes.  You should be informed that a very small minority of people who take atorvastatin can develop muscle pain and weakness as a side effect from that drug.  If you experience those side effects, then stop the drug and contact me.  If you start the medication, you should have a follow up fasting cholesterol panel and routine liver blood test after you have been taking the medication for 2 months.  You can schedule a lab appointment for that purpose.   Please " contact me if you would like to get that medication started so we can phone in an prescritpion and arrange for appropriate follow up.    Otherwise your lab results look OK.  I do not think you need treatment for your thyroid based on these results.  Weight loss would help control your blood sugar.  Even loosing 5-10lbs could help this parameter a lot.            Sincerely,    Dr. Fonseca    The 10-year ASCVD risk score (Emilyshannan INFANTE Jr, et al., 2013) is: 16.5%    Values used to calculate the score:      Age: 67 years      Sex: Male      Is Non- : No      Diabetic: No      Tobacco smoker: No      Systolic Blood Pressure: 117 mmHg      Is BP treated: Yes      HDL Cholesterol: 35 mg/dL      Total Cholesterol: 179 mg/dL

## 2017-09-19 ENCOUNTER — HOSPITAL ENCOUNTER (OUTPATIENT)
Dept: ULTRASOUND IMAGING | Facility: CLINIC | Age: 67
Discharge: HOME | End: 2017-09-19
Attending: INTERNAL MEDICINE | Admitting: INTERNAL MEDICINE
Payer: COMMERCIAL

## 2017-09-19 DIAGNOSIS — Z13.6 ENCOUNTER FOR ABDOMINAL AORTIC ANEURYSM (AAA) SCREENING: ICD-10-CM

## 2017-09-19 PROCEDURE — 76706 US ABDL AORTA SCREEN AAA: CPT

## 2017-09-20 NOTE — PROGRESS NOTES
The following letter pertains to your most recent diagnostic tests:    -Your abdominal aortic aneurysm screen ultrasound did NOT show evidence for abdominal aortic aneurysm.       Sincerely,    Dr. Fonseca

## 2017-10-12 ENCOUNTER — MYC MEDICAL ADVICE (OUTPATIENT)
Dept: FAMILY MEDICINE | Facility: CLINIC | Age: 67
End: 2017-10-12

## 2017-10-27 ENCOUNTER — OFFICE VISIT (OUTPATIENT)
Dept: FAMILY MEDICINE | Facility: CLINIC | Age: 67
End: 2017-10-27
Payer: COMMERCIAL

## 2017-10-27 VITALS
OXYGEN SATURATION: 95 % | DIASTOLIC BLOOD PRESSURE: 66 MMHG | TEMPERATURE: 98 F | BODY MASS INDEX: 32.15 KG/M2 | HEIGHT: 69 IN | HEART RATE: 72 BPM | SYSTOLIC BLOOD PRESSURE: 129 MMHG | WEIGHT: 217.1 LBS

## 2017-10-27 DIAGNOSIS — F33.42 RECURRENT MAJOR DEPRESSIVE DISORDER, IN FULL REMISSION (H): ICD-10-CM

## 2017-10-27 DIAGNOSIS — Z01.818 PREOP GENERAL PHYSICAL EXAM: Primary | ICD-10-CM

## 2017-10-27 DIAGNOSIS — N40.1 BENIGN PROSTATIC HYPERPLASIA WITH LOWER URINARY TRACT SYMPTOMS, SYMPTOM DETAILS UNSPECIFIED: ICD-10-CM

## 2017-10-27 DIAGNOSIS — I10 BENIGN ESSENTIAL HYPERTENSION: ICD-10-CM

## 2017-10-27 DIAGNOSIS — E78.5 HYPERLIPIDEMIA LDL GOAL <130: ICD-10-CM

## 2017-10-27 DIAGNOSIS — J45.40 MODERATE PERSISTENT ASTHMA WITHOUT COMPLICATION: ICD-10-CM

## 2017-10-27 PROCEDURE — 99214 OFFICE O/P EST MOD 30 MIN: CPT | Performed by: INTERNAL MEDICINE

## 2017-10-27 RX ORDER — ATORVASTATIN CALCIUM 40 MG/1
40 TABLET, FILM COATED ORAL DAILY
Qty: 90 TABLET | Refills: 3 | Status: SHIPPED | OUTPATIENT
Start: 2017-10-27 | End: 2018-10-07

## 2017-10-27 NOTE — PROGRESS NOTES
[address] [address]  [phone]  Dept: [phone]    PRE-OP EVALUATION:  Today's date: 10/27/2017    Dionte Mackey (: 1950) presents for pre-operative evaluation assessment as requested by Dr. Torres.  He requires evaluation and anesthesia risk assessment prior to undergoing surgery/procedure for treatment of bilateral cataracts  .  Proposed procedure: Bilateral Cataract    Date of Surgery/ Procedure:  10- Right eye and 2017 Left eye  Time of Surgery/ Procedure: Right eye on 10- @ 7:30am, Left ryz03- @11:30am  Hospital/Surgical Facility: [facility]    FAX:  [phone]      Primary Physician: Romeo Fonseca  Type of Anesthesia Anticipated: to be determined    Patient has a Health Care Directive or Living Will:  YES     1. NO - Do you have a history of heart attack, stroke, stent, bypass or surgery on an artery in the head, neck, heart or legs?  2. NO - Do you ever have any pain or discomfort in your chest?  3. NO - Do you have a history of  Heart Failure?  4. NO - Are you troubled by shortness of breath when: walking on the level, up a slight hill or at night?  5. NO - Do you currently have a cold, bronchitis or other respiratory infection?  6. NO - Do you have a cough, shortness of breath or wheezing?  7. NO - Do you sometimes get pains in the calves of your legs when you walk?  8. NO - Do you or anyone in your family have previous history of blood clots?  9. NO - Do you or does anyone in your family have a serious bleeding problem such as prolonged bleeding following surgeries or cuts?  10. NO - Have you ever had problems with anemia or been told to take iron pills?  11. NO - Have you had any abnormal blood loss such as black, tarry or bloody stools, or abnormal vaginal bleeding?  12. NO - Have you ever had a blood transfusion?  13. yes - Have you or any of your relatives ever had problems with anesthesia?  Sister   suddenly unexpectedly after receiving sodium pentothal.      14. NO - Do you have sleep apnea, excessive snoring or daytime drowsiness?  15. NO - Do you have any prosthetic heart valves?  16. NO - Do you have prosthetic joints?  17. NO - Is there any chance that you may be pregnant?        HPI:                                                      Brief HPI related to upcoming procedure: Progressive vision loss both eyes over several years now severe found to have cataract.      Patient can easily 4 METS of physical activity without chest pain or dyspnea      MEDICAL HISTORY:                                                    Patient Active Problem List    Diagnosis Date Noted     Recurrent major depressive disorder, in full remission (H) 2016     Priority: Medium     Non morbid obesity due to excess calories 2016     Priority: Medium     Benign essential hypertension 10/18/2016     Priority: Medium     Seasonal allergic rhinitis 10/18/2016     Priority: Medium     Moderate persistent asthma without complication 10/18/2016     Priority: Medium     Vasculogenic erectile dysfunction 10/18/2016     Priority: Medium     Herpes zoster without complication 10/18/2016     Priority: Medium     BPH (benign prostatic hyperplasia) 2015     Priority: Medium     Hematuria 2015     Priority: Medium      Past Medical History:   Diagnosis Date     BPH (benign prostatic hyperplasia)      Depression      Hypertension      Non morbid obesity due to excess calories 2016     Recurrent major depressive disorder, in full remission (H) 2016     Uncomplicated asthma      Past Surgical History:   Procedure Laterality Date     BACK SURGERY      cervical fusion,neck fx repair     COLONOSCOPY       CYSTOSCOPY, TRANSURETHRAL RESECTION (TUR) PROSTATE, COMBINED N/A 4/3/2015    Procedure: COMBINED CYSTOSCOPY, TRANSURETHRAL RESECTION (TUR) PROSTATE;  Surgeon: Bakari Barrios MD;  Location:  OR     ENT SURGERY       SINUS SURGERY X 3,SEPTOPLASTY,TONSILLECTOMY     FOOT SURGERY Right      GENITOURINARY SURGERY      TURP IN 2010     TONSILLECTOMY       Current Outpatient Prescriptions   Medication Sig Dispense Refill     atorvastatin (LIPITOR) 40 MG tablet Take 1 tablet (40 mg) by mouth daily 90 tablet 3     sertraline (ZOLOFT) 100 MG tablet Take 1 tablet (100 mg) by mouth 2 times daily 180 tablet 3     lisinopril (PRINIVIL,ZESTRIL) 30 MG tablet Take 1 tablet (30 mg) by mouth daily 90 tablet 3     hydrochlorothiazide (HYDRODIURIL) 25 MG tablet Take 1 tablet (25 mg) by mouth daily 90 tablet 3     mometasone-formoterol (DULERA) 200-5 MCG/ACT oral inhaler Inhale 2 puffs into the lungs 2 times daily 1 Inhaler 3     albuterol (PROAIR HFA/PROVENTIL HFA/VENTOLIN HFA) 108 (90 BASE) MCG/ACT Inhaler Inhale 2 puffs into the lungs every 6 hours 1 Inhaler 11     MYRBETRIQ 25 MG 24 hr tablet Take 1 tablet (25 mg) by mouth daily 90 tablet 3     buPROPion (WELLBUTRIN XL) 150 MG 24 hr tablet Take 1 tablet (150 mg) by mouth every morning 90 tablet 3     budesonide (PULMICORT) 0.5 MG/2ML nebulizer solution Take 0.5 mg by nebulization daily       mupirocin (BACTROBAN) 2 % ointment        Sildenafil Citrate (VIAGRA PO) Take 100 mg by mouth as needed       OTC products: None, except as noted above    Allergies   Allergen Reactions     Ciprofloxacin Shortness Of Breath, Itching and Difficulty breathing     Moxifloxacin Hives      Latex Allergy: NO    Social History   Substance Use Topics     Smoking status: Never Smoker     Smokeless tobacco: Never Used     Alcohol use 1.8 - 2.4 oz/week     3 - 4 Standard drinks or equivalent per week      Comment: 3 drinks on weekend days     History   Drug Use No       REVIEW OF SYSTEMS:                                                    Constitutional, neuro, ENT, endocrine, pulmonary, cardiac, gastrointestinal, genitourinary, musculoskeletal, integument and psychiatric systems are negative, except as otherwise  "noted.      EXAM:                                                    /66 (BP Location: Right arm, Patient Position: Chair, Cuff Size: Adult Regular)  Pulse 72  Temp 98  F (36.7  C) (Oral)  Ht 5' 9\" (1.753 m)  Wt 217 lb 1.6 oz (98.5 kg)  SpO2 95%  BMI 32.06 kg/m2    GENERAL APPEARANCE: healthy, alert and no distress     EYES: EOMI,  PERRL     HENT: ear canals and TM's normal and nose and mouth without ulcers or lesions; he wears hearing aids     NECK: no adenopathy, no asymmetry, masses, or scars and thyroid normal to palpation     RESP: lungs clear to auscultation - no rales, rhonchi or wheezes     CV: regular rates and rhythm, normal S1 S2, no S3 or S4 and no murmur, click or rub     ABDOMEN:  Obese, soft, nontender, no HSM or masses and bowel sounds normal     MS: extremities normal- no gross deformities noted, no evidence of inflammation in joints, FROM in all extremities.     SKIN: no suspicious lesions or rashes     NEURO: Normal strength and tone, sensory exam grossly normal, mentation intact and speech normal     PSYCH: mentation appears normal. and affect normal/bright     LYMPHATICS: No axillary, cervical, or supraclavicular nodes    DIAGNOSTICS:                                                    No labs or EKG required for low risk surgery (cataract, skin procedure, breast biopsy, etc)    Recent Labs   Lab Test  09/13/17   0921 01/25/17  10/18/16   1057   HGB  14.0   --   14.2   PLT  226   --   303   NA  136   --   135   POTASSIUM  4.1  4.6  4.4   CR  0.91  1.00  1.00        IMPRESSION:                                                    Reason for surgery/procedure: Cataracts  Diagnosis/reason for consult: Preoperative evaluation     The proposed surgical procedure is considered LOW risk.    REVISED CARDIAC RISK INDEX  The patient has the following serious cardiovascular risks for perioperative complications such as (MI, PE, VFib and 3  AV Block):  No serious cardiac risks  INTERPRETATION: 0 " risks: Class I (very low risk - 0.4% complication rate)    The patient has the following additional risks for perioperative complications:  No identified additional risks      ICD-10-CM    1. Preop general physical exam Z01.818    2. Benign essential hypertension I10    3. Moderate persistent asthma without complication J45.40    4. Benign prostatic hyperplasia with lower urinary tract symptoms, symptom details unspecified N40.1    5. Recurrent major depressive disorder, in full remission (H) F33.42    6. Hyperlipidemia LDL goal <130 E78.5 atorvastatin (LIPITOR) 40 MG tablet     Lipid panel reflex to direct LDL Fasting     Blood pressure OK   Asthma stable  BPH symptoms well controlled  Mood well controlled  He would like to start statin therapy for primary prevention after discussion of risks and benefits of atorvastatin  Return in 2 months for fasting lipid panel   RECOMMENDATIONS:                                                      --Patient is to take all scheduled medications on the day of surgery    APPROVAL GIVEN to proceed with proposed procedure, without further diagnostic evaluation       Signed Electronically by: Romeo Fonseca MD    Copy of this evaluation report is provided to requesting physician.    [facility] Preop Guidelines

## 2017-10-27 NOTE — NURSING NOTE
"Chief Complaint   Patient presents with     Pre-Op Exam       Initial /66 (BP Location: Right arm, Patient Position: Chair, Cuff Size: Adult Regular)  Pulse 72  Temp 98  F (36.7  C) (Oral)  Ht 5' 9\" (1.753 m)  Wt 217 lb 1.6 oz (98.5 kg)  SpO2 95%  BMI 32.06 kg/m2 Estimated body mass index is 32.06 kg/(m^2) as calculated from the following:    Height as of this encounter: 5' 9\" (1.753 m).    Weight as of this encounter: 217 lb 1.6 oz (98.5 kg).  Medication Reconciliation: complete   Deborah Rivera MA  "

## 2017-10-27 NOTE — MR AVS SNAPSHOT
After Visit Summary   10/27/2017    Dionte Mackey    MRN: [id number]           Patient Information     Date Of Birth          1950        Visit Information        Provider Department      10/27/2017 2:00 PM Romeo Fonseca MD [facility]        Today's Diagnoses     Preop general physical exam    -  1    Benign essential hypertension        Moderate persistent asthma without complication        Benign prostatic hyperplasia with lower urinary tract symptoms, symptom details unspecified        Recurrent major depressive disorder, in full remission (H)        Hyperlipidemia LDL goal <130          Care Instructions      Before Your Surgery      Call your surgeon if there is any change in your health. This includes signs of a cold or flu (such as a sore throat, runny nose, cough, rash or fever).    Do not smoke, drink alcohol or take over the counter medicine (unless your surgeon or primary care doctor tells you to) for the 24 hours before and after surgery.    If you take prescribed drugs: Follow your doctor s orders about which medicines to take and which to stop until after surgery.    Eating and drinking prior to surgery: follow the instructions from your surgeon    Take a shower or bath the night before surgery. Use the soap your surgeon gave you to gently clean your skin. If you do not have soap from your surgeon, use your regular soap. Do not shave or scrub the surgery site.  Wear clean pajamas and have clean sheets on your bed.           Follow-ups after your visit        Future tests that were ordered for you today     Open Future Orders        Priority Expected Expires Ordered    Lipid panel reflex to direct LDL Fasting Routine 12/26/2017 2/24/2018 10/27/2017            Who to contact     If you have questions or need follow up information about today's clinic visit or your schedule please contact [facility] directly at [phone].  Normal or non-critical lab and  "imaging results will be communicated to you by MyChart, letter or phone within 4 business days after the clinic has received the results. If you do not hear from us within 7 days, please contact the clinic through truedash or phone. If you have a critical or abnormal lab result, we will notify you by phone as soon as possible.  Submit refill requests through truedash or call your pharmacy and they will forward the refill request to us. Please allow 3 business days for your refill to be completed.          Additional Information About Your Visit        [facility] Information     t[facility]edash gives you secure access to your electronic health record. If you see a primary care provider, you can also send messages to your care team and make appointments. If you have questions, please call your primary care clinic.  If you do not have a primary care provider, please call [phone] and they will assist you.        Care EveryWhere ID     This is your Care EveryWhere ID. This could be used by other organizations to access your [facility] medical records  [id number]        Your Vitals Were     Pulse Temperature Height Pulse Oximetry BMI (Body Mass Index)       72 98  F (36.7  C) (Oral) 5' 9\" (1.753 m) 95% 32.06 kg/m2        Blood Pressure from Last 3 Encounters:   10/27/17 129/66   09/13/17 117/67   02/22/17 144/79    Weight from Last 3 Encounters:   10/27/17 217 lb 1.6 oz (98.5 kg)   09/13/17 208 lb (94.3 kg)   02/22/17 214 lb (97.1 kg)                 Today's Medication Changes          These changes are accurate as of: 10/27/17  2:35 PM.  If you have any questions, ask your nurse or doctor.               Start taking these medicines.        Dose/Directions    atorvastatin 40 MG tablet   Commonly known as:  LIPITOR   Used for:  Hyperlipidemia LDL goal <130   Started by:  Romeo Fonseca MD        Dose:  40 mg   Take 1 tablet (40 mg) by mouth daily   Quantity:  90 tablet   Refills:  3            Where to get your medicines    "   These medications were sent to [facility] - [place], [place] - [facility]  [address] [place] [address]     Phone:  [phone]     atorvastatin 40 MG tablet                Primary Care Provider Office Phone # Fax #    Romeo WHITNEY MD Raymundo [phone]       [facility] [address] [facility] [address]  [facility] [address]        Equal Access to Services     JJ LOPEZ : Hadii aad ku hadasho Soomaali, waaxda luqadaha, qaybta kaalmada adeegyada, waxay idiin hayaan adeeg kharash la'hoa . [facility] [phone].    ATENCIÓN: Si habla español, tiene a san disposición servicios gratuitos de asistencia lingüística. [facility] [phone].    We comply with applicable federal civil rights laws and [place] laws. We do not discriminate on the basis of race, color, national origin, age, disability, sex, sexual orientation, or gender identity.            Thank you!     Thank you for choosing [facility]  for your care. Our goal is always to provide you with excellent care. Hearing back from our patients is one way we can continue to improve our services. Please take a few minutes to complete the written survey that you may receive in the mail after your visit with us. Thank you!             Your Updated Medication List - Protect others around you: Learn how to safely use, store and throw away your medicines at www.disposemymeds.org.          This list is accurate as of: 10/27/17  2:35 PM.  Always use your most recent med list.                   Brand Name Dispense Instructions for use Diagnosis    albuterol 108 (90 BASE) MCG/ACT Inhaler    PROAIR HFA/PROVENTIL HFA/VENTOLIN HFA    1 Inhaler    Inhale 2 puffs into the lungs every 6 hours    Moderate persistent asthma without complication       atorvastatin 40 MG tablet    LIPITOR    90 tablet    Take 1 tablet (40 mg) by mouth daily    Hyperlipidemia LDL goal <130       budesonide 0.5 MG/2ML neb solution     PULMICORT     Take 0.5 mg by nebulization daily        buPROPion 150 MG 24 hr tablet    WELLBUTRIN XL    90 tablet    Take 1 tablet (150 mg) by mouth every morning    Recurrent major depressive disorder, in full remission (H)       hydrochlorothiazide 25 MG tablet    HYDRODIURIL    90 tablet    Take 1 tablet (25 mg) by mouth daily    Benign essential hypertension       lisinopril 30 MG tablet    PRINIVIL,ZESTRIL    90 tablet    Take 1 tablet (30 mg) by mouth daily    Benign essential hypertension       mometasone-formoterol 200-5 MCG/ACT oral inhaler    DULERA    1 Inhaler    Inhale 2 puffs into the lungs 2 times daily    Moderate persistent asthma without complication       mupirocin 2 % ointment    BACTROBAN          MYRBETRIQ 25 MG 24 hr tablet   Generic drug:  mirabegron     90 tablet    Take 1 tablet (25 mg) by mouth daily    OAB (overactive bladder)       sertraline 100 MG tablet    ZOLOFT    180 tablet    Take 1 tablet (100 mg) by mouth 2 times daily    Recurrent major depressive disorder, in full remission (H)       VIAGRA PO      Take 100 mg by mouth as needed

## 2017-12-21 DIAGNOSIS — E78.5 HYPERLIPIDEMIA LDL GOAL <130: ICD-10-CM

## 2017-12-21 LAB
CHOLEST SERPL-MCNC: 125 MG/DL
HDLC SERPL-MCNC: 48 MG/DL
LDLC SERPL CALC-MCNC: 33 MG/DL
NONHDLC SERPL-MCNC: 77 MG/DL
TRIGL SERPL-MCNC: 221 MG/DL

## 2017-12-21 PROCEDURE — 36415 COLL VENOUS BLD VENIPUNCTURE: CPT | Performed by: INTERNAL MEDICINE

## 2017-12-21 PROCEDURE — 80061 LIPID PANEL: CPT | Performed by: INTERNAL MEDICINE

## 2017-12-23 NOTE — PROGRESS NOTES
The following letter pertains to your most recent diagnostic tests:    Cholesterol looks much better on Lipitor.  Keep taking atorvastatin (Lipitor) as you are.  Reducing carbohydrates and fats in your diet, losing weight and consuming less alcohol can improve your triglyceride levels.       Sincerely,    Dr. Fonseca

## 2018-05-31 NOTE — PROGRESS NOTES
SUBJECTIVE:   Dionte Mackey is a 67 year old male who presents for Preventive Visit.    Are you in the first 12 months of your Medicare Part B coverage?      Healthy Habits:    Do you get at least three servings of calcium containing foods daily (dairy, green leafy vegetables, etc.)? yes    Amount of exercise or daily activities, outside of work: 2 day(s) per week    Problems taking medications regularly No    Medication side effects: No    Have you had an eye exam in the past two years? yes    Do you see a dentist twice per year? yes    Do you have sleep apnea, excessive snoring or daytime drowsiness?no      Ability to successfully perform activities of daily living: Yes, no assistance needed    Home safety:  none identified     Hearing impairment: Yes, bwears bilateral hearing aids    Fall risk:      COGNITIVE SCREEN  1) Repeat 3 items (Banana, Sunrise, Chair)    2) Clock draw: NORMAL  3) 3 item recall: Recalls 3 objects  Results: 3 items recalled: COGNITIVE IMPAIRMENT LESS LIKELY    Mini-CogTM Copyright S Maria Isabel. Licensed by the author for use in [facility]; reprinted with permission (soob@.[facility]). All rights reserved.          Mini-CogTM Copyright S Maria Isabel. Licensed by the author for use in [facility]; reprinted with permission ([email or web address]). All rights reserved.          PHQ-9 SCORE 11/9/2016 6/9/2017 6/1/2018   Total Score 2 0 0     Asthma Follow-Up    Was ACT completed today?    Yes    ACT Total Scores 6/1/2018   ACT TOTAL SCORE (Goal Greater than or Equal to 20) 21   In the past 12 months, how many times did you visit the emergency room for your asthma without being admitted to the hospital? 1   In the past 12 months, how many times were you hospitalized overnight because of your asthma? 0       Recent asthma triggers that patient is dealing with: yard work                   Reviewed and updated as needed this visit by clinical staff  Tobacco         Reviewed and updated as  needed this visit by Provider        Social History   Substance Use Topics     Smoking status: Never Smoker     Smokeless tobacco: Never Used     Alcohol use 1.8 - 2.4 oz/week     3 - 4 Standard drinks or equivalent per week      Comment: 3 drinks on weekend days       If you drink alcohol do you typically have >3 drinks per day or >7 drinks per week?                         Today's PHQ-2 Score:   PHQ-2 ( 1999 [employer]) 10/27/2017 9/13/2017   Q1: Little interest or pleasure in doing things 0 0   Q2: Feeling down, depressed or hopeless 0 0   PHQ-2 Score 0 0       Do you feel safe in your environment - Yes      Current providers sharing in care for this patient include:   Patient Care Team:  Romeo Fonseca MD as PCP - General (Internal Medicine)    The following health maintenance items are reviewed in Epic and correct as of today:  Health Maintenance   Topic Date Due     DEPRESSION ACTION PLAN Q1 YR  08/02/1968     ADVANCE DIRECTIVE PLANNING Q5 YRS  08/02/2005     PHQ-9 Q6 MONTHS  12/09/2017     ASTHMA CONTROL TEST Q6 MOS  03/13/2018     ASTHMA ACTION PLAN Q1 YR  09/13/2018     FALL RISK ASSESSMENT  09/13/2018     LIPID SCREEN Q5 YR MALE (SYSTEM ASSIGNED)  12/21/2022     TETANUS IMMUNIZATION (SYSTEM ASSIGNED)  08/18/2023     COLON CANCER SCREEN (SYSTEM ASSIGNED)  10/30/2025     PNEUMOCOCCAL  Completed     INFLUENZA VACCINE  Completed     AORTIC ANEURYSM SCREENING (SYSTEM ASSIGNED)  Completed     HEPATITIS C SCREENING  Completed     Patient Active Problem List   Diagnosis     BPH (benign prostatic hyperplasia)     Hematuria     Benign essential hypertension     Seasonal allergic rhinitis     Moderate persistent asthma without complication     Vasculogenic erectile dysfunction     Herpes zoster without complication     Recurrent major depressive disorder, in full remission (H)     Non morbid obesity due to excess calories     Hyperlipidemia LDL goal <130     Past Surgical History:   Procedure Laterality Date     BACK  SURGERY      cervical fusion,neck fx repair     COLONOSCOPY       CYSTOSCOPY, TRANSURETHRAL RESECTION (TUR) PROSTATE, COMBINED N/A 4/3/2015    Procedure: COMBINED CYSTOSCOPY, TRANSURETHRAL RESECTION (TUR) PROSTATE;  Surgeon: Bakari Barrios MD;  Location: [facility] OR     ENT SURGERY      SINUS SURGERY X 3,SEPTOPLASTY,TONSILLECTOMY     FOOT SURGERY Right      GENITOURINARY SURGERY      TURP IN 2010     TONSILLECTOMY         Social History   Substance Use Topics     Smoking status: Never Smoker     Smokeless tobacco: Never Used     Alcohol use 1.8 - 2.4 oz/week     3 - 4 Standard drinks or equivalent per week      Comment: 3 drinks on weekend days     Family History   Problem Relation Age of Onset     Colon Cancer Father 70     Coronary Artery Disease No family hx of      CEREBROVASCULAR DISEASE No family hx of      DIABETES No family hx of          Current Outpatient Prescriptions   Medication Sig Dispense Refill     albuterol (PROAIR HFA/PROVENTIL HFA/VENTOLIN HFA) 108 (90 BASE) MCG/ACT Inhaler Inhale 2 puffs into the lungs every 6 hours (Patient taking differently: Inhale 2 puffs into the lungs every 6 hours as needed ) 1 Inhaler 11     atorvastatin (LIPITOR) 40 MG tablet Take 1 tablet (40 mg) by mouth daily 90 tablet 3     budesonide (PULMICORT) 0.5 MG/2ML nebulizer solution Take 0.5 mg by nebulization daily       buPROPion (WELLBUTRIN XL) 150 MG 24 hr tablet Take 1 tablet (150 mg) by mouth every morning 90 tablet 3     hydrochlorothiazide (HYDRODIURIL) 25 MG tablet Take 1 tablet (25 mg) by mouth daily 90 tablet 3     lisinopril (PRINIVIL,ZESTRIL) 30 MG tablet Take 1 tablet (30 mg) by mouth daily 90 tablet 3     mometasone-formoterol (DULERA) 200-5 MCG/ACT oral inhaler Inhale 2 puffs into the lungs 2 times daily 1 Inhaler 3     mupirocin (BACTROBAN) 2 % ointment        MYRBETRIQ 25 MG 24 hr tablet Take 1 tablet (25 mg) by mouth daily 90 tablet 3     sertraline (ZOLOFT) 100 MG tablet Take 1 tablet (100 mg) by mouth 2  "times daily 180 tablet 3     Sildenafil Citrate (VIAGRA PO) Take 100 mg by mouth as needed       Allergies   Allergen Reactions     Ciprofloxacin Shortness Of Breath, Itching and Difficulty breathing     Moxifloxacin Hives           ROS:  Constitutional, HEENT, cardiovascular, pulmonary, gi and gu systems are negative, except as otherwise noted.    OBJECTIVE:   /69 (Cuff Size: Adult Regular)  Pulse 69  Ht 5' 9.25\" (1.759 m)  Wt 213 lb (96.6 kg)  SpO2 97%  BMI 31.23 kg/m2 Estimated body mass index is 31.23 kg/(m^2) as calculated from the following:    Height as of this encounter: 5' 9.25\" (1.759 m).    Weight as of this encounter: 213 lb (96.6 kg).  EXAM:   GENERAL: healthy, alert and no distress  EYES: Eyes grossly normal to inspection, PERRL and conjunctivae and sclerae normal  HENT: ear canals and TM's normal, nose and mouth without ulcers or lesions  NECK: no adenopathy, no asymmetry, masses, or scars and thyroid normal to palpation  RESP: lungs clear to auscultation - no rales, rhonchi or wheezes  CV: regular rate and rhythm, normal S1 S2, no S3 or S4, no murmur, click or rub, no peripheral edema and peripheral pulses strong  ABDOMEN: soft, nontender, no hepatosplenomegaly, no masses and bowel sounds normal  RECTAL: normal sphincter tone, no rectal masses, prostate normal size, smooth, nontender without nodules or masses  MS: no gross musculoskeletal defects noted, no edema  SKIN: no suspicious lesions or rashes  NEURO: Normal strength and tone, mentation intact and speech normal  PSYCH: mentation appears normal, affect normal/bright    ASSESSMENT / PLAN:   1. Routine general medical examination at a health care facility      2. Moderate persistent asthma without complication  Well controlled   - mometasone-formoterol (DULERA) 200-5 MCG/ACT oral inhaler; Inhale 2 puffs into the lungs 2 times daily  Dispense: 1 Inhaler; Refill: 3    3. Recurrent major depressive disorder, in full remission (H)  Well " "controlled   - buPROPion (WELLBUTRIN XL) 150 MG 24 hr tablet; Take 1 tablet (150 mg) by mouth every morning  Dispense: 90 tablet; Refill: 3  - sertraline (ZOLOFT) 100 MG tablet; Take 1 tablet (100 mg) by mouth 2 times daily  Dispense: 180 tablet; Refill: 3    4. Benign essential hypertension  Well controlled   - lisinopril (PRINIVIL,ZESTRIL) 30 MG tablet; Take 1 tablet (30 mg) by mouth daily  Dispense: 90 tablet; Refill: 3  - hydrochlorothiazide (HYDRODIURIL) 25 MG tablet; Take 1 tablet (25 mg) by mouth daily  Dispense: 90 tablet; Refill: 3    5. OAB (overactive bladder)  Symptoms well controlled   - MYRBETRIQ 25 MG 24 hr tablet; Take 1 tablet (25 mg) by mouth daily  Dispense: 90 tablet; Refill: 3    6. Hyperlipidemia LDL goal <130  Recheck lipids today; on statin therapy   - Comprehensive metabolic panel  - CBC with platelets    7. Prostate cancer screening    - Prostate spec antigen screen  - Lipid panel reflex to direct LDL Fasting    8. Hypothyroidism, unspecified type    - TSH    End of Life Planning:  Patient currently has an advanced directive: Yes.  Practitioner is supportive of decision.    COUNSELING:  Reviewed preventive health counseling, as reflected in patient instructions  Special attention given to:       Regular exercise       Healthy diet/nutrition       Immunizations    Chandragrbrennan recommended               Estimated body mass index is 31.23 kg/(m^2) as calculated from the following:    Height as of this encounter: 5' 9.25\" (1.759 m).    Weight as of this encounter: 213 lb (96.6 kg).  Weight management plan: Discussed healthy diet and exercise guidelines and patient will follow up in 12 months in clinic to re-evaluate.     reports that he has never smoked. He has never used smokeless tobacco.      Appropriate preventive services were discussed with this patient, including applicable screening as appropriate for cardiovascular disease, diabetes, osteopenia/osteoporosis, and glaucoma.  As appropriate " for age/gender, discussed screening for colorectal cancer, prostate cancer, breast cancer, and cervical cancer. Checklist reviewing preventive services available has been given to the patient.    Reviewed patients plan of care and provided an AVS. The Basic Care Plan (routine screening as documented in Health Maintenance) for Dionte meets the Care Plan requirement. This Care Plan has been established and reviewed with the Patient.    Counseling Resources:  ATP IV Guidelines  Pooled Cohorts Equation Calculator  Breast Cancer Risk Calculator  FRAX Risk Assessment  ICSI Preventive Guidelines  Dietary Guidelines for Americans, 2010  USDA's MyPlate  ASA Prophylaxis  Lung CA Screening    Romeo Fonseca MD  [facility]

## 2018-05-31 NOTE — PATIENT INSTRUCTIONS
"You should get the new shingles vaccine \"SHINGRIX\" (not Zostavax) at your pharmacy.             Preventive Health Recommendations:       Male Ages 65 and over    Yearly exam:             See your health care provider every year in order to  o   Review health changes.   o   Discuss preventive care.    o   Review your medicines if your doctor has prescribed any.    Talk with your health care provider about whether you should have a test to screen for prostate cancer (PSA).    Every 3 years, have a diabetes test (fasting glucose). If you are at risk for diabetes, you should have this test more often.    Every 5 years, have a cholesterol test. Have this test more often if you are at risk for high cholesterol or heart disease.     Every 10 years, have a colonoscopy. Or, have a yearly FIT test (stool test). These exams will check for colon cancer.    Talk to with your health care provider about screening for Abdominal Aortic Aneurysm if you have a family history of AAA or have a history of smoking.  Shots:     Get a flu shot each year.     Get a tetanus shot every 10 years.     Talk to your doctor about your pneumonia vaccines. There are now two you should receive - Pneumovax (PPSV 23) and Prevnar (PCV 13).    Talk to your doctor about a shingles vaccine.     Talk to your doctor about the hepatitis B vaccine.  Nutrition:     Eat at least 5 servings of fruits and vegetables each day.     Eat whole-grain bread, whole-wheat pasta and brown rice instead of white grains and rice.     Talk to your doctor about Calcium and Vitamin D.   Lifestyle    Exercise for at least 150 minutes a week (30 minutes a day, 5 days a week). This will help you control your weight and prevent disease.     Limit alcohol to one drink per day.     No smoking.     Wear sunscreen to prevent skin cancer.     See your dentist every six months for an exam and cleaning.     See your eye doctor every 1 to 2 years to screen for conditions such as glaucoma, " macular degeneration and cataracts.

## 2018-06-01 ENCOUNTER — OFFICE VISIT (OUTPATIENT)
Dept: FAMILY MEDICINE | Facility: CLINIC | Age: 68
End: 2018-06-01
Payer: COMMERCIAL

## 2018-06-01 VITALS
BODY MASS INDEX: 31.55 KG/M2 | SYSTOLIC BLOOD PRESSURE: 116 MMHG | WEIGHT: 213 LBS | DIASTOLIC BLOOD PRESSURE: 69 MMHG | HEIGHT: 69 IN | HEART RATE: 69 BPM | OXYGEN SATURATION: 97 %

## 2018-06-01 DIAGNOSIS — E78.5 HYPERLIPIDEMIA LDL GOAL <130: ICD-10-CM

## 2018-06-01 DIAGNOSIS — B00.1 RECURRENT COLD SORES: ICD-10-CM

## 2018-06-01 DIAGNOSIS — J45.40 MODERATE PERSISTENT ASTHMA WITHOUT COMPLICATION: ICD-10-CM

## 2018-06-01 DIAGNOSIS — F33.42 RECURRENT MAJOR DEPRESSIVE DISORDER, IN FULL REMISSION (H): ICD-10-CM

## 2018-06-01 DIAGNOSIS — I10 BENIGN ESSENTIAL HYPERTENSION: ICD-10-CM

## 2018-06-01 DIAGNOSIS — N32.81 OAB (OVERACTIVE BLADDER): ICD-10-CM

## 2018-06-01 DIAGNOSIS — Z00.00 ROUTINE GENERAL MEDICAL EXAMINATION AT A HEALTH CARE FACILITY: Primary | ICD-10-CM

## 2018-06-01 DIAGNOSIS — Z12.5 PROSTATE CANCER SCREENING: ICD-10-CM

## 2018-06-01 DIAGNOSIS — E03.9 HYPOTHYROIDISM, UNSPECIFIED TYPE: ICD-10-CM

## 2018-06-01 LAB
ALBUMIN SERPL-MCNC: 4 G/DL (ref 3.4–5)
ALP SERPL-CCNC: 77 U/L (ref 40–150)
ALT SERPL W P-5'-P-CCNC: 58 U/L (ref 0–70)
ANION GAP SERPL CALCULATED.3IONS-SCNC: 6 MMOL/L (ref 3–14)
AST SERPL W P-5'-P-CCNC: 34 U/L (ref 0–45)
BILIRUB SERPL-MCNC: 0.4 MG/DL (ref 0.2–1.3)
BUN SERPL-MCNC: 18 MG/DL (ref 7–30)
CALCIUM SERPL-MCNC: 9.1 MG/DL (ref 8.5–10.1)
CHLORIDE SERPL-SCNC: 105 MMOL/L (ref 94–109)
CHOLEST SERPL-MCNC: 108 MG/DL
CO2 SERPL-SCNC: 27 MMOL/L (ref 20–32)
CREAT SERPL-MCNC: 1.12 MG/DL (ref 0.66–1.25)
ERYTHROCYTE [DISTWIDTH] IN BLOOD BY AUTOMATED COUNT: 13.7 % (ref 10–15)
GFR SERPL CREATININE-BSD FRML MDRD: 65 ML/MIN/1.7M2
GLUCOSE SERPL-MCNC: 122 MG/DL (ref 70–99)
HCT VFR BLD AUTO: 43.5 % (ref 40–53)
HDLC SERPL-MCNC: 37 MG/DL
HGB BLD-MCNC: 14.6 G/DL (ref 13.3–17.7)
LDLC SERPL CALC-MCNC: 35 MG/DL
MCH RBC QN AUTO: 30.7 PG (ref 26.5–33)
MCHC RBC AUTO-ENTMCNC: 33.6 G/DL (ref 31.5–36.5)
MCV RBC AUTO: 91 FL (ref 78–100)
NONHDLC SERPL-MCNC: 71 MG/DL
PLATELET # BLD AUTO: 283 10E9/L (ref 150–450)
POTASSIUM SERPL-SCNC: 4.4 MMOL/L (ref 3.4–5.3)
PROT SERPL-MCNC: 7.3 G/DL (ref 6.8–8.8)
PSA SERPL-ACNC: 2.85 UG/L (ref 0–4)
RBC # BLD AUTO: 4.76 10E12/L (ref 4.4–5.9)
SODIUM SERPL-SCNC: 138 MMOL/L (ref 133–144)
TRIGL SERPL-MCNC: 178 MG/DL
TSH SERPL DL<=0.005 MIU/L-ACNC: 4.41 MU/L (ref 0.4–4)
WBC # BLD AUTO: 9 10E9/L (ref 4–11)

## 2018-06-01 PROCEDURE — 99397 PER PM REEVAL EST PAT 65+ YR: CPT | Performed by: INTERNAL MEDICINE

## 2018-06-01 PROCEDURE — 84443 ASSAY THYROID STIM HORMONE: CPT | Performed by: INTERNAL MEDICINE

## 2018-06-01 PROCEDURE — G0103 PSA SCREENING: HCPCS | Performed by: INTERNAL MEDICINE

## 2018-06-01 PROCEDURE — 36415 COLL VENOUS BLD VENIPUNCTURE: CPT | Performed by: INTERNAL MEDICINE

## 2018-06-01 PROCEDURE — 80061 LIPID PANEL: CPT | Performed by: INTERNAL MEDICINE

## 2018-06-01 PROCEDURE — 85027 COMPLETE CBC AUTOMATED: CPT | Performed by: INTERNAL MEDICINE

## 2018-06-01 PROCEDURE — 80053 COMPREHEN METABOLIC PANEL: CPT | Performed by: INTERNAL MEDICINE

## 2018-06-01 RX ORDER — VALACYCLOVIR HYDROCHLORIDE 1 G/1
2000 TABLET, FILM COATED ORAL 2 TIMES DAILY
Qty: 4 TABLET | Refills: 11 | Status: SHIPPED | OUTPATIENT
Start: 2018-06-01 | End: 2022-12-29

## 2018-06-01 RX ORDER — SERTRALINE HYDROCHLORIDE 100 MG/1
100 TABLET, FILM COATED ORAL 2 TIMES DAILY
Qty: 180 TABLET | Refills: 3 | Status: SHIPPED | OUTPATIENT
Start: 2018-06-01 | End: 2019-07-11

## 2018-06-01 RX ORDER — BUPROPION HYDROCHLORIDE 150 MG/1
150 TABLET ORAL EVERY MORNING
Qty: 90 TABLET | Refills: 3 | Status: SHIPPED | OUTPATIENT
Start: 2018-06-01 | End: 2019-07-11

## 2018-06-01 RX ORDER — HYDROCHLOROTHIAZIDE 25 MG/1
25 TABLET ORAL DAILY
Qty: 90 TABLET | Refills: 3 | Status: SHIPPED | OUTPATIENT
Start: 2018-06-01 | End: 2019-07-11

## 2018-06-01 RX ORDER — MIRABEGRON 25 MG/1
25 TABLET, FILM COATED, EXTENDED RELEASE ORAL DAILY
Qty: 90 TABLET | Refills: 3 | Status: SHIPPED | OUTPATIENT
Start: 2018-06-01 | End: 2018-11-16

## 2018-06-01 RX ORDER — LISINOPRIL 30 MG/1
30 TABLET ORAL DAILY
Qty: 90 TABLET | Refills: 3 | Status: SHIPPED | OUTPATIENT
Start: 2018-06-01 | End: 2019-03-20

## 2018-06-01 NOTE — MR AVS SNAPSHOT
"              After Visit Summary   6/1/2018    Dionte aMckey    MRN: [id number]           Patient Information     Date Of Birth          1950        Visit Information        Provider Department      6/1/2018 8:00 AM Romeo Fonseca MD [facility]        Today's Diagnoses     Routine general medical examination at a health care facility    -  1    Moderate persistent asthma without complication        Recurrent major depressive disorder, in full remission (H)        Benign essential hypertension        OAB (overactive bladder)        Hyperlipidemia LDL goal <130        Prostate cancer screening        Hypothyroidism, unspecified type          Care Instructions    You should get the new shingles vaccine \"SHINGRIX\" (not Zostavax) at your pharmacy.             Preventive Health Recommendations:       Male Ages 65 and over    Yearly exam:             See your health care provider every year in order to  o   Review health changes.   o   Discuss preventive care.    o   Review your medicines if your doctor has prescribed any.    Talk with your health care provider about whether you should have a test to screen for prostate cancer (PSA).    Every 3 years, have a diabetes test (fasting glucose). If you are at risk for diabetes, you should have this test more often.    Every 5 years, have a cholesterol test. Have this test more often if you are at risk for high cholesterol or heart disease.     Every 10 years, have a colonoscopy. Or, have a yearly FIT test (stool test). These exams will check for colon cancer.    Talk to with your health care provider about screening for Abdominal Aortic Aneurysm if you have a family history of AAA or have a history of smoking.  Shots:     Get a flu shot each year.     Get a tetanus shot every 10 years.     Talk to your doctor about your pneumonia vaccines. There are now two you should receive - Pneumovax (PPSV 23) and Prevnar (PCV 13).    Talk to your doctor about a " shingles vaccine.     Talk to your doctor about the hepatitis B vaccine.  Nutrition:     Eat at least 5 servings of fruits and vegetables each day.     Eat whole-grain bread, whole-wheat pasta and brown rice instead of white grains and rice.     Talk to your doctor about Calcium and Vitamin D.   Lifestyle    Exercise for at least 150 minutes a week (30 minutes a day, 5 days a week). This will help you control your weight and prevent disease.     Limit alcohol to one drink per day.     No smoking.     Wear sunscreen to prevent skin cancer.     See your dentist every six months for an exam and cleaning.     See your eye doctor every 1 to 2 years to screen for conditions such as glaucoma, macular degeneration and cataracts.          Follow-ups after your visit        Follow-up notes from your care team     Return in about 1 year (around 6/1/2019) for Physical Exam.      Who to contact     If you have questions or need follow up information about today's clinic visit or your schedule please contact [facility] directly at [phone].  Normal or non-critical lab and imaging results will be communicated to you by ideaTree - innovate | mentor | investhart, letter or phone within 4 business days after the clinic has received the results. If you do not hear from us within 7 days, please contact the clinic through [email or web address] or phone. If you have a critical or abnormal lab result, we will notify you by phone as soon as possible.  Submit refill requests through Funky Moves or call your pharmacy and they will forward the refill request to us. Please allow 3 business days for your refill to be completed.          Additional Information About Your Visit        Funky Moves Information     Funk[employer] Moves gives you secure access to your electronic health record. If you see a primary care provider, you can also send messages to your care team and make appointments. If you have questions, please call your primary care clinic.  If you do not have a primary care provider,  "please call [phone] and they will assist you.        Care EveryWhere ID     This is your Care EveryWhere ID. This could be used by other organizations to access your [place] medical records  [id number]        Your Vitals Were     Pulse Height Pulse Oximetry BMI (Body Mass Index)          69 5' 9.25\" (1.759 m) 97% 31.23 kg/m2         Blood Pressure from Last 3 Encounters:   06/01/18 116/69   10/27/17 129/66   09/13/17 117/67    Weight from Last 3 Encounters:   06/01/18 213 lb (96.6 kg)   10/27/17 217 lb 1.6 oz (98.5 kg)   09/13/17 208 lb (94.3 kg)              We Performed the Following     CBC with platelets     Comprehensive metabolic panel     Lipid panel reflex to direct LDL Fasting     Prostate spec antigen screen     TSH          Today's Medication Changes          These changes are accurate as of 6/1/18  8:35 AM.  If you have any questions, ask your nurse or doctor.               These medicines have changed or have updated prescriptions.        Dose/Directions    albuterol 108 (90 Base) MCG/ACT Inhaler   Commonly known as:  PROAIR HFA/PROVENTIL HFA/VENTOLIN HFA   This may have changed:    - when to take this  - reasons to take this   Used for:  Moderate persistent asthma without complication        Dose:  2 puff   Inhale 2 puffs into the lungs every 6 hours   Quantity:  1 Inhaler   Refills:  11            Where to get your medicines      These medications were sent to [facility] - [address] [address]     Phone:  [phone]     buPROPion 150 MG 24 hr tablet    hydrochlorothiazide 25 MG tablet    lisinopril 30 MG tablet    mometasone-formoterol 200-5 MCG/ACT oral inhaler    MYRBETRIQ 25 MG 24 hr tablet    sertraline 100 MG tablet                Primary Care Provider Office Phone # Fax #    Romeo Fonseca [phone] [address]  [facility] [address]        Equal Access to Services     [facility] " GAAR : Hadii aad ku hadconcha Valenzuelaali, waaxda luqadaha, qaybta kaalmada ademaribel, mariajose horacio josettevaughn koromasydluis phillips . So [facility] [place] [phone].    ATENCIÓN: Si habla español, tiene a san disposición servicios gratuitos de asistencia lingüística. Llame al [phone].    We comply with applicable federal civil rights laws and [place] laws. We do not discriminate on the basis of race, color, national origin, age, disability, sex, sexual orientation, or gender identity.            Thank you!     Thank you for choosing [facility]  for your care. Our goal is always to provide you with excellent care. Hearing back from our patients is one way we can continue to improve our services. Please take a few minutes to complete the written survey that you may receive in the mail after your visit with us. Thank you!             Your Updated Medication List - Protect others around you: Learn how to safely use, store and throw away your medicines at www.disposemymeds.org.          This list is accurate as of 6/1/18  8:35 AM.  Always use your most recent med list.                   Brand Name Dispense Instructions for use Diagnosis    albuterol 108 (90 Base) MCG/ACT Inhaler    PROAIR HFA/PROVENTIL HFA/VENTOLIN HFA    1 Inhaler    Inhale 2 puffs into the lungs every 6 hours    Moderate persistent asthma without complication       atorvastatin 40 MG tablet    LIPITOR    90 tablet    Take 1 tablet (40 mg) by mouth daily    Hyperlipidemia LDL goal <130       budesonide 0.5 MG/2ML neb solution    PULMICORT     Take 0.5 mg by nebulization daily        buPROPion 150 MG 24 hr tablet    WELLBUTRIN XL    90 tablet    Take 1 tablet (150 mg) by mouth every morning    Recurrent major depressive disorder, in full remission (H)       hydrochlorothiazide 25 MG tablet    HYDRODIURIL    90 tablet    Take 1 tablet (25 mg) by mouth daily    Benign essential hypertension       lisinopril 30 MG tablet    PRINIVIL,ZESTRIL    90 tablet     Take 1 tablet (30 mg) by mouth daily    Benign essential hypertension       mometasone-formoterol 200-5 MCG/ACT oral inhaler    DULERA    1 Inhaler    Inhale 2 puffs into the lungs 2 times daily    Moderate persistent asthma without complication       mupirocin 2 % ointment    BACTROBAN          MYRBETRIQ 25 MG 24 hr tablet   Generic drug:  mirabegron     90 tablet    Take 1 tablet (25 mg) by mouth daily    OAB (overactive bladder)       sertraline 100 MG tablet    ZOLOFT    180 tablet    Take 1 tablet (100 mg) by mouth 2 times daily    Recurrent major depressive disorder, in full remission (H)       VIAGRA PO      Take 100 mg by mouth as needed

## 2018-06-02 ASSESSMENT — PATIENT HEALTH QUESTIONNAIRE - PHQ9: SUM OF ALL RESPONSES TO PHQ QUESTIONS 1-9: 0

## 2018-06-02 ASSESSMENT — ASTHMA QUESTIONNAIRES: ACT_TOTALSCORE: 21

## 2018-06-02 NOTE — PROGRESS NOTES
"The following letter pertains to your most recent diagnostic tests:    -Liver and gallbladder tests are normal for you. (ALT,AST, Alk phos, bilirubin), kidney function is normal for you (Creatinine, GFR), Sodium is normal, Potassium is normal for you, Calcium is normal for you, Glucose (blood sugar) is elevated, but not in the diabetic range.     -The thyroid tests (TSH) is mildly elevated which could mean slightly underactive thyroid gland hormone production, but it is not high enough to warrant treatment with thyroid supplements.  It should be monitored at least annually.    -Your total cholesterol is 108 which is at your goal of total cholesterol less than 200.    -Your triglycerides are 178 which are just above your goal of triglycerides less than 150.    -Your HDL or \"good cholesterol\" is 37 which is below your goal of HDL cholesterol greater than 40.    -Your LDL cholesterol or \"bad cholesterol\" is 35 which is at your goal of LDL cholesterol less than <100.  Your LDL goal is based on your risk factors for artery disease.     -Your prostate specific antigen (PSA) test result returned normal.     -Your complete blood counts including your hemoglobin returned normal for you.             Bottom line:  Your labs look OK, but reducing carbohydrates and fats in your diet, losing weight and consuming less alcohol can improve your blood sugar (glucose) and triglyceride levels. Increasing exercise can improve HDL (\"good cholesterol\") levels.  A good target to shoot for is 30 minutes of aerobic activity at least 4 days per week. We can recheck these parameters in 1 one year.        Follow up:  Schedule an appointment for a physical examination with fasting blood tests in one year's time, or return sooner if new questions, symptoms or problems arise.       Sincerely,    Dr. Fonseca"

## 2018-08-22 ENCOUNTER — TRANSFERRED RECORDS (OUTPATIENT)
Dept: HEALTH INFORMATION MANAGEMENT | Facility: CLINIC | Age: 68
End: 2018-08-22

## 2018-11-02 ENCOUNTER — MYC REFILL (OUTPATIENT)
Dept: FAMILY MEDICINE | Facility: CLINIC | Age: 68
End: 2018-11-02

## 2018-11-02 DIAGNOSIS — E78.5 HYPERLIPIDEMIA LDL GOAL <130: ICD-10-CM

## 2018-11-02 DIAGNOSIS — N32.81 OAB (OVERACTIVE BLADDER): ICD-10-CM

## 2018-11-02 DIAGNOSIS — F33.42 RECURRENT MAJOR DEPRESSIVE DISORDER, IN FULL REMISSION (H): ICD-10-CM

## 2018-11-02 NOTE — TELEPHONE ENCOUNTER
Message from Light Extractionhart:  Original authorizing provider: MD Dionte Caba would like a refill of the following medications:  buPROPion (WELLBUTRIN XL) 150 MG 24 hr tablet [Romeo Fonseca MD]  MYRBETRIQ 25 MG 24 hr tablet [Romeo Fonseca MD]  sertraline (ZOLOFT) 100 MG tablet [Romeo Fonseca MD]  atorvastatin (LIPITOR) 40 MG tablet [Romeo Fonseca MD]    Preferred pharmacy: [facility], [address]    Comment:

## 2018-11-02 NOTE — TELEPHONE ENCOUNTER
"buPROPion (WELLBUTRIN XL) 150 MG 24 hr tablet  Last Written Prescription Date:  6/1/18  Last Fill Quantity: 90,  # refills: 0   Last office visit: 6/1/2018 with prescribing provider:  Raymundo Capone Office Visit:        MYRBETRIQ 25 MG 24 hr tablet  Last Written Prescription Date:  6/1/18  Last Fill Quantity: 90,  # refills: 3   Last office visit: 6/1/2018 with prescribing provider:  Raymundo Capone Office Visit:          sertraline (ZOLOFT) 100 MG tablet  Last Written Prescription Date:  6/1/18  Last Fill Quantity: 180,  # refills: 3   Last office visit: 6/1/2018 with prescribing provider:  Raymundo Capone Office Visit:          atorvastatin (LIPITOR) 40 MG tablet  Last Written Prescription Date:  10/9/18  Last Fill Quantity: 90,  # refills: 1   Last office visit: 6/1/2018 with prescribing provider:  Raymundo Capone Office Visit:            Requested Prescriptions   Pending Prescriptions Disp Refills     buPROPion (WELLBUTRIN XL) 150 MG 24 hr tablet 90 tablet 3     Sig: Take 1 tablet (150 mg) by mouth every morning    SSRIs Protocol Passed    11/2/2018  8:51 AM       Passed - PHQ-9 score less than 5 in past 6 months    Please review last PHQ-9 score.          Passed - Medication is Bupropion    If the medication is Bupropion (Wellbutrin), and the patient is taking for smoking cessation; OK to refill.         Passed - Patient is age 18 or older       Passed - Recent (6 mo) or future (30 days) visit within the authorizing provider's specialty    Patient had office visit in the last 6 months or has a visit in the next 30 days with authorizing provider or within the authorizing provider's specialty.  See \"Patient Info\" tab in inbasket, or \"Choose Columns\" in Meds & Orders section of the refill encounter.            MYRBETRIQ 25 MG 24 hr tablet 90 tablet 3     Sig: Take 1 tablet (25 mg) by mouth daily    Beta 3 Adrenergic Agonists Passed    11/2/2018  8:51 AM       Passed - Most recent BP less than 140/90 on record    " "BP Readings from Last 3 Encounters:   06/01/18 116/69   10/27/17 129/66   09/13/17 117/67                Passed - Recent or future visit with authorizing provider's specialty    Patient had office visit in the last 12 months or has a visit in the next 30 days with authorizing provider or within the authorizing provider's specialty.  See \"Patient Info\" tab in inbasket, or \"Choose Columns\" in Meds & Orders section of the refill encounter.             Passed - Most recent eGFR greater then or equal to 30 within past 12 months    Recent Labs   Lab Test  06/01/18   0843   GFRESTIMATED  65   GFRESTBLACK  79            Passed - Patient is of age 18 years or older        sertraline (ZOLOFT) 100 MG tablet 180 tablet 3     Sig: Take 1 tablet (100 mg) by mouth 2 times daily    SSRIs Protocol Passed    11/2/2018  8:51 AM       Passed - PHQ-9 score less than 5 in past 6 months    Please review last PHQ-9 score.          Passed - Medication is Bupropion    If the medication is Bupropion (Wellbutrin), and the patient is taking for smoking cessation; OK to refill.         Passed - Patient is age 18 or older       Passed - Recent (6 mo) or future (30 days) visit within the authorizing provider's specialty    Patient had office visit in the last 6 months or has a visit in the next 30 days with authorizing provider or within the authorizing provider's specialty.  See \"Patient Info\" tab in inbasket, or \"Choose Columns\" in Meds & Orders section of the refill encounter.            atorvastatin (LIPITOR) 40 MG tablet 90 tablet 1     Sig: Take 1 tablet (40 mg) by mouth daily    Statins Protocol Passed    11/2/2018  8:51 AM       Passed - LDL on file in past 12 months    Recent Labs   Lab Test  06/01/18   0843   LDL  35            Passed - No abnormal creatine kinase in past 12 months    No lab results found.            Passed - Recent (12 mo) or future (30 days) visit within the authorizing provider's specialty    Patient had office visit " "in the last 12 months or has a visit in the next 30 days with authorizing provider or within the authorizing provider's specialty.  See \"Patient Info\" tab in inbasket, or \"Choose Columns\" in Meds & Orders section of the refill encounter.             Passed - Patient is age 18 or older          "

## 2018-11-05 RX ORDER — BUPROPION HYDROCHLORIDE 150 MG/1
150 TABLET ORAL EVERY MORNING
Qty: 90 TABLET | Refills: 3 | OUTPATIENT
Start: 2018-11-05

## 2018-11-05 RX ORDER — MIRABEGRON 25 MG/1
25 TABLET, FILM COATED, EXTENDED RELEASE ORAL DAILY
Qty: 90 TABLET | Refills: 3 | OUTPATIENT
Start: 2018-11-05

## 2018-11-05 RX ORDER — ATORVASTATIN CALCIUM 40 MG/1
40 TABLET, FILM COATED ORAL DAILY
Qty: 90 TABLET | Refills: 1 | Status: SHIPPED | OUTPATIENT
Start: 2018-11-05 | End: 2019-07-11

## 2018-11-05 RX ORDER — SERTRALINE HYDROCHLORIDE 100 MG/1
100 TABLET, FILM COATED ORAL 2 TIMES DAILY
Qty: 180 TABLET | Refills: 3 | OUTPATIENT
Start: 2018-11-05

## 2018-11-05 NOTE — TELEPHONE ENCOUNTER
"Bupropion, Myrbetriq, sertraline all sent for #90, R-3 on 6/1/18 with note \"Profile RX\". Called pharmacy to verify. Confirmed, these meds are on the \"Pending List\" Pt needs to contact [facility] to have RX's released.     Atorvastatin was sent 10/10/18 for #90- After speaking with pt- he did not receive this RX. Sent again to pharmacy with note to please resend.     Contacted patient: Agreed with plan, will call [facility] to request the medications from \"Pending List\" be activated.     Janna KWON RN    "

## 2019-01-14 ENCOUNTER — OFFICE VISIT (OUTPATIENT)
Dept: FAMILY MEDICINE | Facility: CLINIC | Age: 69
End: 2019-01-14
Payer: COMMERCIAL

## 2019-01-14 VITALS
BODY MASS INDEX: 30.2 KG/M2 | TEMPERATURE: 97.5 F | SYSTOLIC BLOOD PRESSURE: 120 MMHG | OXYGEN SATURATION: 96 % | HEART RATE: 73 BPM | DIASTOLIC BLOOD PRESSURE: 66 MMHG | WEIGHT: 206 LBS

## 2019-01-14 DIAGNOSIS — J06.9 VIRAL URI: Primary | ICD-10-CM

## 2019-01-14 DIAGNOSIS — J45.901 MODERATE ASTHMA WITH EXACERBATION, UNSPECIFIED WHETHER PERSISTENT: ICD-10-CM

## 2019-01-14 PROCEDURE — 99213 OFFICE O/P EST LOW 20 MIN: CPT | Performed by: NURSE PRACTITIONER

## 2019-01-14 RX ORDER — AZITHROMYCIN 250 MG/1
TABLET, FILM COATED ORAL
Qty: 6 TABLET | Refills: 0 | Status: SHIPPED | OUTPATIENT
Start: 2019-01-14 | End: 2019-07-11

## 2019-01-14 NOTE — PATIENT INSTRUCTIONS
"  Patient Education     Asthma (Adult)  Asthma is a disease where the medium and  small air passages within the lung go into spasm and restrict the flow of air. Inflammation and swelling of the airways cause further blockage. During an acute asthma attack, these factors cause trouble breathing, wheezing, cough and chest tightness.    An asthma attack can be triggered by many things. Common triggers include infections such as the common cold, bronchitis, and pneumonia. Irritants such as smoke or pollutants in the air, very cold air, emotional upset, and exercise can also trigger an attack. In many adults with asthma, allergies to dust, mold, pollen and animal dander can cause an asthma attack. Skipping doses of daily asthma medicine can also bring on an asthma attack.  Asthma can be controlled using the proper medicines prescribed by your healthcare provider and avoiding exposure to known triggers including allergens and irritants.  Home care    Take prescribed medicine exactly at the times advised. If you need medicine such as from a hand held inhaler or aerosol breathing machine more than every 4 hours, contact your healthcare provider or seek immediate medical attention. If prescribed an antibiotic or prednisone, take all of the medicine as prescribed, even if you are feeling better after a few days.    Don't smoke. Avoid being exposed to the smoke of others.    Some people with asthma have worsening of their symptoms when they take aspirin and non-steroidal or fever-reducing medicines like ibuprofen and naproxen. Talk to your healthcare provider if you think this may apply to you.  Follow-up care  Follow up with your healthcare provider, or as advised. Always bring all of your current medicines to any appointments with your healthcare provider. Also bring a complete list of medicines even those not taken for asthma. If you don't already have one, talk to your healthcare provider about developing your own \"Asthma " "Action Plan.\"  A pneumococcal (pneumonia) vaccine and yearly flu shot (every fall) are recommended. Ask your doctor about this.  When to seek medical advice  Call your healthcare provider right away if any of these occur:     Increased wheezing or shortness of breath    Need to use your inhalers more often than usual without relief    Fever of 100.4 F (38 C) or higher, or as directed by your healthcare provider    Coughing up lots of dark-colored or bloody sputum (mucus)    Chest pain with each breath    If you use a peak flow meter as part of an Asthma Action Plan, and you are still in the yellow zone (50% to 80%) 15 minutes after using inhaler medicine.  Call 911  Call 911 if any of the following occur    Trouble walking or talking because of shortness of breath    If you use a peak flow meter as part of an Asthma Action Plan and you are still in the red zone (less than 50%) 15 minutes after using inhaler medicine    Lips or fingernails turning gray or blue  Date Last Reviewed: 5/1/2017 2000-2018 [employer]. [address] [place] [address]. All rights reserved. This information is not intended as a substitute for professional medical care. Always follow your healthcare professional's instructions.    Continue the mucinex   Push fluid intake  Use the albuterol inhaler 2 puffs every 6 hours  Continue the dulera twice a day  Use flonase 2 sprays each nostril once a day  Push fluids  Steam heat  Warm sinus compresses  zpak as prescribed        "

## 2019-01-14 NOTE — PROGRESS NOTES
SUBJECTIVE:   Dionte Mackey is a 68 year old male who presents to clinic today for the following health issues:      Possible sinus infection and ear infection. Has been having sinus drainage for 4 days. Started mild on Thursday and got worse on Friday night/ Saturday.  Using mucinex and drinking water and has seen some improvement in past 2 days  Has a cough but is not SOB.  Using dulera inhaler 2 puffs twice a day, but not using albuterol because not wheezing    Problem list and histories reviewed & adjusted, as indicated.  Additional history: as documented    Patient Active Problem List   Diagnosis     BPH (benign prostatic hyperplasia)     Hematuria     Benign essential hypertension     Seasonal allergic rhinitis     Moderate persistent asthma without complication     Vasculogenic erectile dysfunction     Herpes zoster without complication     Recurrent major depressive disorder, in full remission (H)     Non morbid obesity due to excess calories     Hyperlipidemia LDL goal <130     Past Surgical History:   Procedure Laterality Date     BACK SURGERY      cervical fusion,neck fx repair     COLONOSCOPY       CYSTOSCOPY, TRANSURETHRAL RESECTION (TUR) PROSTATE, COMBINED N/A 4/3/2015    Procedure: COMBINED CYSTOSCOPY, TRANSURETHRAL RESECTION (TUR) PROSTATE;  Surgeon: Bakari Barrios MD;  Location: [facility]     ENT SURGERY      SINUS SURGERY X 3,SEPTOPLASTY,TONSILLECTOMY     FOOT SURGERY Right      GENITOURINARY SURGERY      TURP IN 2010     TONSILLECTOMY         Social History     Tobacco Use     Smoking status: Never Smoker     Smokeless tobacco: Never Used   Substance Use Topics     Alcohol use: Yes     Alcohol/week: 1.8 - 2.4 oz     Types: 3 - 4 Standard drinks or equivalent per week     Comment: 3 drinks on weekend days     Family History   Problem Relation Age of Onset     Colon Cancer Father 70     Coronary Artery Disease No family hx of      Cerebrovascular Disease No family hx of      Diabetes No family hx  of          Current Outpatient Medications   Medication Sig Dispense Refill     albuterol (PROAIR HFA/PROVENTIL HFA/VENTOLIN HFA) 108 (90 BASE) MCG/ACT Inhaler Inhale 2 puffs into the lungs every 6 hours (Patient taking differently: Inhale 2 puffs into the lungs every 6 hours as needed ) 1 Inhaler 11     atorvastatin (LIPITOR) 40 MG tablet Take 1 tablet (40 mg) by mouth daily 90 tablet 1     budesonide (PULMICORT) 0.5 MG/2ML nebulizer solution Take 0.5 mg by nebulization daily       buPROPion (WELLBUTRIN XL) 150 MG 24 hr tablet Take 1 tablet (150 mg) by mouth every morning 90 tablet 3     hydrochlorothiazide (HYDRODIURIL) 25 MG tablet Take 1 tablet (25 mg) by mouth daily 90 tablet 3     lisinopril (PRINIVIL,ZESTRIL) 30 MG tablet Take 1 tablet (30 mg) by mouth daily 90 tablet 3     mometasone-formoterol (DULERA) 200-5 MCG/ACT oral inhaler Inhale 2 puffs into the lungs 2 times daily 1 Inhaler 3     oxybutynin (DITROPAN XL) 5 MG 24 hr tablet Take 1 tablet (5 mg) by mouth daily 90 tablet 3     sertraline (ZOLOFT) 100 MG tablet Take 1 tablet (100 mg) by mouth 2 times daily 180 tablet 3     Sildenafil Citrate (VIAGRA PO) Take 100 mg by mouth as needed       valACYclovir (VALTREX) 1000 mg tablet Take 2 tablets (2,000 mg) by mouth 2 times daily 4 tablet 11     Allergies   Allergen Reactions     Ciprofloxacin Shortness Of Breath, Itching and Difficulty breathing     Moxifloxacin Hives       Reviewed and updated as needed this visit by clinical staff       Reviewed and updated as needed this visit by Provider         ROS:  Constitutional, HEENT, cardiovascular, pulmonary, gi and gu systems are negative, except as otherwise noted.    OBJECTIVE:     /66 (BP Location: Right arm, Patient Position: Sitting, Cuff Size: Adult Large)   Pulse 73   Temp 97.5  F (36.4  C) (Oral)   Wt 93.4 kg (206 lb)   SpO2 96%   BMI 30.20 kg/m    Body mass index is 30.2 kg/m .  GENERAL: healthy, alert and moderate distress  EYES: Eyes  grossly normal to inspection, PERRL and conjunctivae and sclerae normal  HENT: ear canals and TM's normal, nose and mouth without ulcers or lesions  NECK: no adenopathy, no asymmetry, masses, or scars and thyroid normal to palpation  RESP: lungs ; rhonchi throughout, no rales or wheezing  CV: regular rate and rhythm, normal S1 S2, no S3 or S4, no murmur, click or rub, no peripheral edema   MS: no gross musculoskeletal defects noted, no edema    Diagnostic Test Results:  none     ASSESSMENT/PLAN:       ICD-10-CM    1. Viral URI J06.9    2. Moderate asthma with exacerbation, unspecified whether persistent J45.901 azithromycin (ZITHROMAX) 250 MG tablet       Patient Instructions     Patient Education     Asthma (Adult)  Asthma is a disease where the medium and  small air passages within the lung go into spasm and restrict the flow of air. Inflammation and swelling of the airways cause further blockage. During an acute asthma attack, these factors cause trouble breathing, wheezing, cough and chest tightness.    An asthma attack can be triggered by many things. Common triggers include infections such as the common cold, bronchitis, and pneumonia. Irritants such as smoke or pollutants in the air, very cold air, emotional upset, and exercise can also trigger an attack. In many adults with asthma, allergies to dust, mold, pollen and animal dander can cause an asthma attack. Skipping doses of daily asthma medicine can also bring on an asthma attack.  Asthma can be controlled using the proper medicines prescribed by your healthcare provider and avoiding exposure to known triggers including allergens and irritants.  Home care    Take prescribed medicine exactly at the times advised. If you need medicine such as from a hand held inhaler or aerosol breathing machine more than every 4 hours, contact your healthcare provider or seek immediate medical attention. If prescribed an antibiotic or prednisone, take all of the medicine as  "prescribed, even if you are feeling better after a few days.    Don't smoke. Avoid being exposed to the smoke of others.    Some people with asthma have worsening of their symptoms when they take aspirin and non-steroidal or fever-reducing medicines like ibuprofen and naproxen. Talk to your healthcare provider if you think this may apply to you.  Follow-up care  Follow up with your healthcare provider, or as advised. Always bring all of your current medicines to any appointments with your healthcare provider. Also bring a complete list of medicines even those not taken for asthma. If you don't already have one, talk to your healthcare provider about developing your own \"Asthma Action Plan.\"  A pneumococcal (pneumonia) vaccine and yearly flu shot (every fall) are recommended. Ask your doctor about this.  When to seek medical advice  Call your healthcare provider right away if any of these occur:     Increased wheezing or shortness of breath    Need to use your inhalers more often than usual without relief    Fever of 100.4 F (38 C) or higher, or as directed by your healthcare provider    Coughing up lots of dark-colored or bloody sputum (mucus)    Chest pain with each breath    If you use a peak flow meter as part of an Asthma Action Plan, and you are still in the yellow zone (50% to 80%) 15 minutes after using inhaler medicine.  Call 911  Call 911 if any of the following occur    Trouble walking or talking because of shortness of breath    If you use a peak flow meter as part of an Asthma Action Plan and you are still in the red zone (less than 50%) 15 minutes after using inhaler medicine    Lips or fingernails turning gray or blue  Date Last Reviewed: 5/1/2017 2000-2018 [employer]. [address] [place] [address], [place] [address]. All rights reserved. This information is not intended as a substitute for professional medical care. Always follow your healthcare professional's instructions.    Continue the " mucinex   Push fluid intake  Use the albuterol inhaler 2 puffs every 6 hours  Continue the dulera twice a day  Use flonase 2 sprays each nostril once a day  Push fluids  Steam heat  Warm sinus compresses  zpak as prescribed            GIACOMO Barens CNP  [facility]

## 2019-01-25 DIAGNOSIS — J45.40 MODERATE PERSISTENT ASTHMA WITHOUT COMPLICATION: ICD-10-CM

## 2019-01-25 RX ORDER — MOMETASONE FUROATE AND FORMOTEROL FUMARATE DIHYDRATE 200; 5 UG/1; UG/1
AEROSOL RESPIRATORY (INHALATION)
Qty: 13 G | Refills: 3 | Status: SHIPPED | OUTPATIENT
Start: 2019-01-25 | End: 2019-07-11

## 2019-01-25 RX ORDER — ALBUTEROL SULFATE 90 UG/1
AEROSOL, METERED RESPIRATORY (INHALATION)
Qty: 8.5 G | Refills: 11 | Status: SHIPPED | OUTPATIENT
Start: 2019-01-25 | End: 2019-07-11

## 2019-01-25 NOTE — TELEPHONE ENCOUNTER
"albuterol (PROAIR HFA/PROVENTIL HFA/VENTOLIN HFA) 108 (90 BASE) MCG/ACT Inhaler 1 Inhaler 11 9/13/2017     Last Written Prescription Date:  9/13/17  Last Fill Quantity: 1 inhaler,  # refills: 11   Last office visit: 1/14/2019 with prescribing provider:  Raymundo Capone Office Visit:  None      mometasone-formoterol (DULERA) 200-5 MCG/ACT oral inhaler 1 Inhaler 3 6/1/2018     Last Written Prescription Date:  6/1/18  Last Fill Quantity: 1 inhaler,  # refills: 3   Last office visit: 1/14/2019 with prescribing provider:  Raymundo Capone Office Visit:  none      Requested Prescriptions   Pending Prescriptions Disp Refills     PROAIR  (90 Base) MCG/ACT inhaler [Pharmacy Med Name: ALBUTEROL(PROAIR)HFA IN 8.5G 90MCG] 8.5 g 11     Sig: USE 2 INHALATIONS EVERY 6 HOURS    Asthma Maintenance Inhalers - Anticholinergics Failed - 1/25/2019  8:24 AM       Failed - Asthma control assessment score within normal limits in last 6 months    Please review ACT score.          Passed - Patient is age 12 years or older       Passed - Medication is active on med list       Passed - Recent (6 mo) or future (30 days) visit within the authorizing provider's specialty    Patient had office visit in the last 6 months or has a visit in the next 30 days with authorizing provider or within the authorizing provider's specialty.  See \"Patient Info\" tab in inbasket, or \"Choose Columns\" in Meds & Orders section of the refill encounter.            DULERA 200-5 MCG/ACT inhaler [Pharmacy Med Name: DULERA INHALER 13GM 200/5] 13 g 3     Sig: USE 2 INHALATIONS TWICE A DAY    Inhaled Steroids Protocol Failed - 1/25/2019  8:24 AM       Failed - Asthma control assessment score within normal limits in last 6 months    Please review ACT score.          Passed - Patient is age 12 or older       Passed - Medication is active on med list       Passed - Recent (6 mo) or future (30 days) visit within the authorizing provider's specialty    Patient had office " "visit in the last 6 months or has a visit in the next 30 days with authorizing provider or within the authorizing provider's specialty.  See \"Patient Info\" tab in inbasket, or \"Choose Columns\" in Meds & Orders section of the refill encounter.            [facility] Follow-up to PHQ 11/9/2016 6/9/2017 6/1/2018   PHQ-9 9. Suicide Ideation past 2 weeks Not at all Not at all Not at all       "

## 2019-01-25 NOTE — TELEPHONE ENCOUNTER
ACT Total Scores 9/13/2017 9/13/2017 6/1/2018   ACT TOTAL SCORE (Goal Greater than or Equal to 20) 23 23 21   In the past 12 months, how many times did you visit the emergency room for your asthma without being admitted to the hospital? 1 - 1   In the past 12 months, how many times were you hospitalized overnight because of your asthma? 0 0 0     Routing refill request to provider for review/approval because:  Labs not current:  JOY ChoN, RN  [facility]

## 2019-03-18 DIAGNOSIS — I10 BENIGN ESSENTIAL HYPERTENSION: ICD-10-CM

## 2019-03-19 NOTE — TELEPHONE ENCOUNTER
"Last Written Prescription Date:  6/01/18  Last Fill Quantity: 90 tablet,  # refills: 3   Last office visit: 6/1/2018 with prescribing provider:  Raymundo   Future Office Visit:      Requested Prescriptions   Pending Prescriptions Disp Refills     lisinopril (PRINIVIL/ZESTRIL) 30 MG tablet 90 tablet 3     Sig: Take 1 tablet (30 mg) by mouth daily    ACE Inhibitors (Including Combos) Protocol Passed - 3/18/2019  6:35 PM       Passed - Blood pressure under 140/90 in past 12 months    BP Readings from Last 3 Encounters:   01/14/19 120/66   06/01/18 116/69   10/27/17 129/66                Passed - Recent (12 mo) or future (30 days) visit within the authorizing provider's specialty    Patient had office visit in the last 12 months or has a visit in the next 30 days with authorizing provider or within the authorizing provider's specialty.  See \"Patient Info\" tab in inbasket, or \"Choose Columns\" in Meds & Orders section of the refill encounter.             Passed - Medication is active on med list       Passed - Patient is age 18 or older       Passed - Normal serum creatinine on file in past 12 months    Recent Labs   Lab Test 06/01/18  0843   CR 1.12            Passed - Normal serum potassium on file in past 12 months    Recent Labs   Lab Test 06/01/18  0843   POTASSIUM 4.4               "

## 2019-03-20 RX ORDER — LISINOPRIL 30 MG/1
30 TABLET ORAL DAILY
Qty: 90 TABLET | Refills: 0 | Status: SHIPPED | OUTPATIENT
Start: 2019-03-20 | End: 2019-03-27

## 2019-06-05 DIAGNOSIS — I10 BENIGN ESSENTIAL HYPERTENSION: ICD-10-CM

## 2019-06-05 NOTE — TELEPHONE ENCOUNTER
"lisinopril (PRINIVIL/ZESTRIL) 30 MG tablet  Last Written Prescription Date:  3/27/19  Last Fill Quantity: 30,  # refills: 0   Last office visit: 1/14/2019 with prescribing provider:  Raymundo    Future Office Visit:      Requested Prescriptions   Pending Prescriptions Disp Refills     lisinopril (PRINIVIL/ZESTRIL) 30 MG tablet [Pharmacy Med Name: LISINOPRIL TABS 30MG] 90 tablet 0     Sig: TAKE 1 TABLET DAILY, DUE FOR OFFICE VISIT AND LABS IN JUNE, CALL TO SCHEDULE [phone]       ACE Inhibitors (Including Combos) Protocol Failed - 6/5/2019 10:30 AM        Failed - Normal serum creatinine on file in past 12 months     Recent Labs   Lab Test 06/01/18  0843   CR 1.12             Failed - Normal serum potassium on file in past 12 months     Recent Labs   Lab Test 06/01/18  0843   POTASSIUM 4.4             Passed - Blood pressure under 140/90 in past 12 months     BP Readings from Last 3 Encounters:   01/14/19 120/66   06/01/18 116/69   10/27/17 129/66                 Passed - Recent (12 mo) or future (30 days) visit within the authorizing provider's specialty     Patient had office visit in the last 12 months or has a visit in the next 30 days with authorizing provider or within the authorizing provider's specialty.  See \"Patient Info\" tab in inbasket, or \"Choose Columns\" in Meds & Orders section of the refill encounter.              Passed - Medication is active on med list        Passed - Patient is age 18 or older          "

## 2019-06-06 RX ORDER — LISINOPRIL 30 MG/1
TABLET ORAL
Qty: 90 TABLET | Refills: 0 | Status: SHIPPED | OUTPATIENT
Start: 2019-06-06 | End: 2019-07-11

## 2019-06-06 NOTE — TELEPHONE ENCOUNTER
Medication is being filled for 1 time refill only due to:  Patient needs to be seen because due for a physical.    Sent Mychart reminder

## 2019-06-27 ENCOUNTER — E-VISIT (OUTPATIENT)
Dept: FAMILY MEDICINE | Facility: CLINIC | Age: 69
End: 2019-06-27
Payer: COMMERCIAL

## 2019-06-27 DIAGNOSIS — J06.9 UPPER RESPIRATORY TRACT INFECTION, UNSPECIFIED TYPE: Primary | ICD-10-CM

## 2019-06-27 PROCEDURE — 99444 ZZC PHYSICIAN ONLINE EVALUATION & MANAGEMENT SERVICE: CPT | Performed by: NURSE PRACTITIONER

## 2019-07-09 ASSESSMENT — ACTIVITIES OF DAILY LIVING (ADL): CURRENT_FUNCTION: NO ASSISTANCE NEEDED

## 2019-07-11 ENCOUNTER — OFFICE VISIT (OUTPATIENT)
Dept: FAMILY MEDICINE | Facility: CLINIC | Age: 69
End: 2019-07-11
Payer: COMMERCIAL

## 2019-07-11 VITALS
HEIGHT: 69 IN | BODY MASS INDEX: 30.02 KG/M2 | DIASTOLIC BLOOD PRESSURE: 71 MMHG | TEMPERATURE: 97 F | HEART RATE: 66 BPM | SYSTOLIC BLOOD PRESSURE: 118 MMHG | OXYGEN SATURATION: 95 % | WEIGHT: 202.7 LBS

## 2019-07-11 DIAGNOSIS — Z00.00 ROUTINE GENERAL MEDICAL EXAMINATION AT A HEALTH CARE FACILITY: Primary | ICD-10-CM

## 2019-07-11 DIAGNOSIS — Z12.5 SCREENING FOR PROSTATE CANCER: ICD-10-CM

## 2019-07-11 DIAGNOSIS — J45.40 MODERATE PERSISTENT ASTHMA WITHOUT COMPLICATION: ICD-10-CM

## 2019-07-11 DIAGNOSIS — N40.1 BENIGN PROSTATIC HYPERPLASIA WITH LOWER URINARY TRACT SYMPTOMS, SYMPTOM DETAILS UNSPECIFIED: ICD-10-CM

## 2019-07-11 DIAGNOSIS — R32 URINARY INCONTINENCE, UNSPECIFIED TYPE: ICD-10-CM

## 2019-07-11 DIAGNOSIS — E03.9 HYPOTHYROIDISM, UNSPECIFIED TYPE: ICD-10-CM

## 2019-07-11 DIAGNOSIS — I10 BENIGN ESSENTIAL HYPERTENSION: ICD-10-CM

## 2019-07-11 DIAGNOSIS — E78.5 HYPERLIPIDEMIA LDL GOAL <130: ICD-10-CM

## 2019-07-11 DIAGNOSIS — F33.42 RECURRENT MAJOR DEPRESSIVE DISORDER, IN FULL REMISSION (H): ICD-10-CM

## 2019-07-11 LAB
ERYTHROCYTE [DISTWIDTH] IN BLOOD BY AUTOMATED COUNT: 13.4 % (ref 10–15)
HCT VFR BLD AUTO: 44.1 % (ref 40–53)
HGB BLD-MCNC: 15.2 G/DL (ref 13.3–17.7)
MCH RBC QN AUTO: 31.4 PG (ref 26.5–33)
MCHC RBC AUTO-ENTMCNC: 34.5 G/DL (ref 31.5–36.5)
MCV RBC AUTO: 91 FL (ref 78–100)
PLATELET # BLD AUTO: 317 10E9/L (ref 150–450)
RBC # BLD AUTO: 4.84 10E12/L (ref 4.4–5.9)
WBC # BLD AUTO: 11.4 10E9/L (ref 4–11)

## 2019-07-11 PROCEDURE — G0402 INITIAL PREVENTIVE EXAM: HCPCS | Performed by: INTERNAL MEDICINE

## 2019-07-11 PROCEDURE — 80061 LIPID PANEL: CPT | Performed by: INTERNAL MEDICINE

## 2019-07-11 PROCEDURE — G0103 PSA SCREENING: HCPCS | Performed by: INTERNAL MEDICINE

## 2019-07-11 PROCEDURE — 85027 COMPLETE CBC AUTOMATED: CPT | Performed by: INTERNAL MEDICINE

## 2019-07-11 PROCEDURE — 84443 ASSAY THYROID STIM HORMONE: CPT | Performed by: INTERNAL MEDICINE

## 2019-07-11 PROCEDURE — 36415 COLL VENOUS BLD VENIPUNCTURE: CPT | Performed by: INTERNAL MEDICINE

## 2019-07-11 PROCEDURE — 80053 COMPREHEN METABOLIC PANEL: CPT | Performed by: INTERNAL MEDICINE

## 2019-07-11 RX ORDER — LISINOPRIL 30 MG/1
30 TABLET ORAL DAILY
Qty: 90 TABLET | Refills: 3 | Status: SHIPPED | OUTPATIENT
Start: 2019-07-11 | End: 2020-05-19

## 2019-07-11 RX ORDER — BUPROPION HYDROCHLORIDE 150 MG/1
150 TABLET ORAL EVERY MORNING
Qty: 90 TABLET | Refills: 3 | Status: SHIPPED | OUTPATIENT
Start: 2019-07-11 | End: 2020-05-19

## 2019-07-11 RX ORDER — HYDROCHLOROTHIAZIDE 25 MG/1
25 TABLET ORAL DAILY
Qty: 90 TABLET | Refills: 3 | Status: SHIPPED | OUTPATIENT
Start: 2019-07-11 | End: 2020-05-19

## 2019-07-11 RX ORDER — ALBUTEROL SULFATE 90 UG/1
AEROSOL, METERED RESPIRATORY (INHALATION)
Qty: 8.5 G | Refills: 11 | Status: SHIPPED | OUTPATIENT
Start: 2019-07-11 | End: 2020-03-10

## 2019-07-11 RX ORDER — OXYBUTYNIN CHLORIDE 5 MG/1
5 TABLET, EXTENDED RELEASE ORAL DAILY
Qty: 90 TABLET | Refills: 3 | Status: SHIPPED | OUTPATIENT
Start: 2019-07-11 | End: 2020-08-25

## 2019-07-11 RX ORDER — SERTRALINE HYDROCHLORIDE 100 MG/1
100 TABLET, FILM COATED ORAL 2 TIMES DAILY
Qty: 180 TABLET | Refills: 3 | Status: SHIPPED | OUTPATIENT
Start: 2019-07-11 | End: 2020-05-19

## 2019-07-11 RX ORDER — ATORVASTATIN CALCIUM 40 MG/1
40 TABLET, FILM COATED ORAL DAILY
Qty: 90 TABLET | Refills: 3 | Status: SHIPPED | OUTPATIENT
Start: 2019-07-11 | End: 2019-07-12

## 2019-07-11 ASSESSMENT — ACTIVITIES OF DAILY LIVING (ADL): CURRENT_FUNCTION: NO ASSISTANCE NEEDED

## 2019-07-11 ASSESSMENT — PATIENT HEALTH QUESTIONNAIRE - PHQ9: SUM OF ALL RESPONSES TO PHQ QUESTIONS 1-9: 0

## 2019-07-11 ASSESSMENT — MIFFLIN-ST. JEOR: SCORE: 1683.78

## 2019-07-11 NOTE — LETTER
"[facility]  [address] [facility]  [address]  [place]  [address]  Tel: [phone]    July 12, 2019    Dionte BROWER Key  [address] [address] [place] [id number]        Dear Mr. Felixs,    The following letter pertains to your most recent diagnostic tests:    -Your prostate specific antigen (PSA) test result returned normal.     -TSH (thyroid stimulating hormone) level is normal which indicates normal circulating thyroid hormone levels.      -Your total cholesterol is 139 which is at your goal of total cholesterol less than 200.    -Your triglycerides are 388 which are above your goal of triglycerides less than 150.    -Your HDL or \"good cholesterol\" is 37 which is below your goal of HDL cholesterol greater than 40.    -Your LDL cholesterol or \"bad cholesterol\" is 24 which is below your goal of LDL cholesterol less than <100 but greater than 40.  Your LDL goal is based on your risk factors for artery disease.     -Liver and gallbladder tests are normal for you. (ALT,AST, Alk phos, bilirubin), kidney function is normal for you (Creatinine, GFR), Sodium is normal, Potassium is normal for you, Calcium is normal for you, Glucose (blood sugar) is mildly elevated      -Your complete blood counts including your hemoglobin returned normal for you.         Bottom line:  Your LDL cholesterol probably lower than you need it to be.  You can reduce your atorvastatin (Lipitor) dose from 40 mg daily to 20 mg daily to address this.  I sent an prescription for 20mg tablets to your pharmacy.  atorvastatin (Lipitor) does not affect your triglycerides.  The way to lower triglycerides is by educing carbohydrates and fats in your diet, losing weight and consuming less alcohol.  Increasing exercise can improve HDL (\"good cholesterol\") levels.  A good target to shoot for is 30 minutes of aerobic activity at least 4 days per week.  The rest of the labs look OK.          Follow up:  Schedule an appointment for a physical examination " with fasting blood tests in one year's time, or return sooner if new questions, symptoms or problems arise.       Sincerely,    Dr. Fonseca/JOSE DAVID        Enclosure: Lab Results  Results for orders placed or performed in visit on 07/11/19   Comprehensive metabolic panel   Result Value Ref Range    Sodium 137 133 - 144 mmol/L    Potassium 4.4 3.4 - 5.3 mmol/L    Chloride 104 94 - 109 mmol/L    Carbon Dioxide 26 20 - 32 mmol/L    Anion Gap 7 3 - 14 mmol/L    Glucose 115 (H) 70 - 99 mg/dL    Urea Nitrogen 16 7 - 30 mg/dL    Creatinine 0.93 0.66 - 1.25 mg/dL    GFR Estimate 84 >60 mL/min/[1.73_m2]    GFR Estimate If Black >90 >60 mL/min/[1.73_m2]    Calcium 9.0 8.5 - 10.1 mg/dL    Bilirubin Total 0.4 0.2 - 1.3 mg/dL    Albumin 4.1 3.4 - 5.0 g/dL    Protein Total 7.5 6.8 - 8.8 g/dL    Alkaline Phosphatase 77 40 - 150 U/L    ALT 45 0 - 70 U/L    AST 30 0 - 45 U/L   CBC with platelets   Result Value Ref Range    WBC 11.4 (H) 4.0 - 11.0 10e9/L    RBC Count 4.84 4.4 - 5.9 10e12/L    Hemoglobin 15.2 13.3 - 17.7 g/dL    Hematocrit 44.1 40.0 - 53.0 %    MCV 91 78 - 100 fl    MCH 31.4 26.5 - 33.0 pg    MCHC 34.5 31.5 - 36.5 g/dL    RDW 13.4 10.0 - 15.0 %    Platelet Count 317 150 - 450 10e9/L   Lipid panel reflex to direct LDL Fasting   Result Value Ref Range    Cholesterol 139 <200 mg/dL    Triglycerides 388 (H) <150 mg/dL    HDL Cholesterol 37 (L) >39 mg/dL    LDL Cholesterol Calculated 24 <100 mg/dL    Non HDL Cholesterol 102 <130 mg/dL   Prostate spec antigen screen   Result Value Ref Range    PSA 3.58 0 - 4 ug/L   TSH with free T4 reflex   Result Value Ref Range    TSH 2.96 0.40 - 4.00 mU/L

## 2019-07-11 NOTE — PROGRESS NOTES
"SUBJECTIVE:   Dionte Mackey is a 68 year old male who presents for Preventive Visit.    Are you in the first 12 months of your Medicare coverage?  Yes,  Visual Acuity:  Right Eye: 10/16   Left Eye: 10/12.5  Both Eyes: 10/10    Healthy Habits:     In general, how would you rate your overall health?  Excellent    Frequency of exercise:  2-3 days/week    Duration of exercise:  45-60 minutes    Do you usually eat at least 4 servings of fruit and vegetables a day, include whole grains    & fiber and avoid regularly eating high fat or \"junk\" foods?  Yes    Taking medications regularly:  Yes    Barriers to taking medications:  None    Medication side effects:  None    Ability to successfully perform activities of daily living:  No assistance needed    Home Safety:  No safety concerns identified    Hearing Impairment:  Difficulty following a conversation in a noisy restaurant or crowded room and difficulty understanding soft or whispered speech    In the past 6 months, have you been bothered by leaking of urine? Yes    In general, how would you rate your overall mental or emotional health?  Excellent      PHQ-2 Total Score: 0    Additional concerns today:  Yes    Do you feel safe in your environment? Yes    Do you have a Health Care Directive? Yes: Advance Directive has been received and scanned.      Fall risk  Fallen 2 or more times in the past year?: No  Any fall with injury in the past year?: No    Cognitive Screening   1) Repeat 3 items (Leader, Season, Table)    2) Clock draw: NORMAL  3) 3 item recall: Recalls 2 objects   Results: NORMAL clock, 1-2 items recalled: COGNITIVE IMPAIRMENT LESS LIKELY    Mini-CogTM Janneth Nicolas. Licensed by the author for use in [facility]; reprinted with permission (shyanne@.[email or web address]). All rights reserved.      Do you have sleep apnea, excessive snoring or daytime drowsiness?: no    Reviewed and updated as needed this visit by clinical staff  Tobacco  Allergies  Meds  " Soc Hx        Reviewed and updated as needed this visit by Provider        Social History     Tobacco Use     Smoking status: Never Smoker     Smokeless tobacco: Never Used   Substance Use Topics     Alcohol use: Yes     Alcohol/week: 1.8 - 2.4 oz     Comment: 3 drinks on weekend days     If you drink alcohol do you typically have >3 drinks per day or >7 drinks per week? No    No flowsheet data found.        Depression and Anxiety Follow-Up    How are you doing with your depression since your last visit? Improved     How are you doing with your anxiety since your last visit?  No change    Are you having other symptoms that might be associated with depression or anxiety? No    Have you had a significant life event? No     Do you have any concerns with your use of alcohol or other drugs? No    Social History     Tobacco Use     Smoking status: Never Smoker     Smokeless tobacco: Never Used   Substance Use Topics     Alcohol use: Yes     Alcohol/week: 1.8 - 2.4 oz     Comment: 3 drinks on weekend days     Drug use: No     PHQ 6/9/2017 6/1/2018 7/11/2019   PHQ-9 Total Score 0 0 0   Q9: Thoughts of better off dead/self-harm past 2 weeks Not at all Not at all Not at all     No flowsheet data found.        Suicide Assessment Five-step Evaluation and Treatment (SAFE-T)  Asthma Follow-Up    Was ACT completed today?    Yes    ACT Total Scores 7/11/2019   ACT TOTAL SCORE (Goal Greater than or Equal to 20) 21   In the past 12 months, how many times did you visit the emergency room for your asthma without being admitted to the hospital? 0   In the past 12 months, how many times were you hospitalized overnight because of your asthma? 0       How many days per week do you miss taking your asthma controller medication?  0    Please describe any recent triggers for your asthma: None    Have you had any Emergency Room Visits, Urgent Care Visits, or Hospital Admissions since your last office visit?  Yes  Number of ER or Urgent Care  visits for asthma: 1        Current providers sharing in care for this patient include:   Patient Care Team:  Romeo Fonseca MD as PCP - General (Internal Medicine)  Romeo Fonseca MD as Assigned PCP    The following health maintenance items are reviewed in Epic and correct as of today:  Health Maintenance   Topic Date Due     ADVANCE CARE PLANNING  1950     DEPRESSION ACTION PLAN  1950     ZOSTER IMMUNIZATION (1 of 2) 08/02/2000     ASTHMA ACTION PLAN  09/13/2018     FALL RISK ASSESSMENT  09/13/2018     ASTHMA CONTROL TEST  12/01/2018     MEDICARE ANNUAL WELLNESS VISIT  06/01/2019     INFLUENZA VACCINE (1) 09/01/2019     PHQ-9  01/11/2020     COLONOSCOPY  10/30/2020     LIPID  06/01/2023     DTAP/TDAP/TD IMMUNIZATION (3 - Td) 08/18/2023     HEPATITIS C SCREENING  Completed     PNEUMOCOCCAL IMMUNIZATION 65+ LOW/MEDIUM RISK  Completed     AORTIC ANEURYSM SCREENING (SYSTEM ASSIGNED)  Completed     IPV IMMUNIZATION  Aged Out     MENINGITIS IMMUNIZATION  Aged Out     Patient Active Problem List   Diagnosis     BPH (benign prostatic hyperplasia)     Hematuria     Benign essential hypertension     Seasonal allergic rhinitis     Moderate persistent asthma without complication     Vasculogenic erectile dysfunction     Herpes zoster without complication     Recurrent major depressive disorder, in full remission (H)     Non morbid obesity due to excess calories     Hyperlipidemia LDL goal <130     Past Surgical History:   Procedure Laterality Date     BACK SURGERY      cervical fusion,neck fx repair     COLONOSCOPY       CYSTOSCOPY, TRANSURETHRAL RESECTION (TUR) PROSTATE, COMBINED N/A 4/3/2015    Procedure: COMBINED CYSTOSCOPY, TRANSURETHRAL RESECTION (TUR) PROSTATE;  Surgeon: Bakari Barrios MD;  Location: [facility] OR     ENT SURGERY      SINUS SURGERY X 3,SEPTOPLASTY,TONSILLECTOMY     EYE SURGERY  11/10/2017     FOOT SURGERY Right      GENITOURINARY SURGERY      TURP IN 2010     HEAD & NECK SURGERY  10/15/2017      TONSILLECTOMY         Social History     Tobacco Use     Smoking status: Never Smoker     Smokeless tobacco: Never Used   Substance Use Topics     Alcohol use: Yes     Alcohol/week: 1.8 - 2.4 oz     Comment: 3 drinks on weekend days     Family History   Problem Relation Age of Onset     Colon Cancer Father 70     Coronary Artery Disease No family hx of      Cerebrovascular Disease No family hx of      Diabetes No family hx of          Current Outpatient Medications   Medication Sig Dispense Refill     albuterol (PROAIR HFA) 108 (90 Base) MCG/ACT inhaler USE 2 INHALATIONS EVERY 6 HOURS 8.5 g 11     atorvastatin (LIPITOR) 40 MG tablet Take 1 tablet (40 mg) by mouth daily 90 tablet 3     buPROPion (WELLBUTRIN XL) 150 MG 24 hr tablet Take 1 tablet (150 mg) by mouth every morning 90 tablet 3     hydrochlorothiazide (HYDRODIURIL) 25 MG tablet Take 1 tablet (25 mg) by mouth daily 90 tablet 3     lisinopril (PRINIVIL/ZESTRIL) 30 MG tablet Take 1 tablet (30 mg) by mouth daily 90 tablet 3     mometasone-formoterol (DULERA) 200-5 MCG/ACT inhaler USE 2 INHALATIONS TWICE A DAY 13 g 3     oxybutynin ER (DITROPAN XL) 5 MG 24 hr tablet Take 1 tablet (5 mg) by mouth daily 90 tablet 3     sertraline (ZOLOFT) 100 MG tablet Take 1 tablet (100 mg) by mouth 2 times daily 180 tablet 3     Sildenafil Citrate (VIAGRA PO) Take 100 mg by mouth as needed       valACYclovir (VALTREX) 1000 mg tablet Take 2 tablets (2,000 mg) by mouth 2 times daily 4 tablet 11     budesonide (PULMICORT) 0.5 MG/2ML nebulizer solution Take 0.5 mg by nebulization daily       Allergies   Allergen Reactions     Ciprofloxacin Shortness Of Breath, Itching and Difficulty breathing     Moxifloxacin Hives         Review of Systems  Constitutional, HEENT, cardiovascular, pulmonary, gi and gu systems are negative, except as otherwise noted.    OBJECTIVE:   /71 (BP Location: Right arm, Cuff Size: Adult Large)   Pulse 66   Temp 97  F (36.1  C) (Oral)   Ht 1.759 m (5'  "9.25\")   Wt 91.9 kg (202 lb 11.2 oz)   SpO2 95%   BMI 29.72 kg/m   Estimated body mass index is 29.72 kg/m  as calculated from the following:    Height as of this encounter: 1.759 m (5' 9.25\").    Weight as of this encounter: 91.9 kg (202 lb 11.2 oz).  Physical Exam  GENERAL: healthy, alert and no distress  EYES: Eyes grossly normal to inspection, PERRL and conjunctivae and sclerae normal  HENT: ear canals and TM's normal, nose and mouth without ulcers or lesions  NECK: no adenopathy, no asymmetry, masses, or scars and thyroid normal to palpation  RESP: lungs clear to auscultation - no rales, rhonchi or wheezes  CV: regular rate and rhythm, normal S1 S2, no S3 or S4, no murmur, click or rub, no peripheral edema and peripheral pulses strong  ABDOMEN: soft, nontender, no hepatosplenomegaly, no masses and bowel sounds normal  RECTAL: normal sphincter tone, no rectal masses, prostate mildly large size, smooth, nontender without nodules or masses  MS: no gross musculoskeletal defects noted, no edema  SKIN: no suspicious lesions or rashes  NEURO: Normal strength and tone, mentation intact and speech normal  PSYCH: mentation appears normal, affect normal/bright    Labs pending     ASSESSMENT / PLAN:   1. Routine general medical examination at a health care facility      2. Moderate persistent asthma without complication  Stable  - mometasone-formoterol (DULERA) 200-5 MCG/ACT inhaler; USE 2 INHALATIONS TWICE A DAY  Dispense: 13 g; Refill: 3  - albuterol (PROAIR HFA) 108 (90 Base) MCG/ACT inhaler; USE 2 INHALATIONS EVERY 6 HOURS  Dispense: 8.5 g; Refill: 11    3. Recurrent major depressive disorder, in full remission (H)  Mood well controlled   - buPROPion (WELLBUTRIN XL) 150 MG 24 hr tablet; Take 1 tablet (150 mg) by mouth every morning  Dispense: 90 tablet; Refill: 3  - sertraline (ZOLOFT) 100 MG tablet; Take 1 tablet (100 mg) by mouth 2 times daily  Dispense: 180 tablet; Refill: 3    4. Benign essential " "hypertension  Well controlled   - hydrochlorothiazide (HYDRODIURIL) 25 MG tablet; Take 1 tablet (25 mg) by mouth daily  Dispense: 90 tablet; Refill: 3  - lisinopril (PRINIVIL/ZESTRIL) 30 MG tablet; Take 1 tablet (30 mg) by mouth daily  Dispense: 90 tablet; Refill: 3    5. Hyperlipidemia LDL goal <100  ON statin therapy recheck lipids   - Comprehensive metabolic panel  - CBC with platelets  - Lipid panel reflex to direct LDL Fasting  - atorvastatin (LIPITOR) 40 MG tablet; Take 1 tablet (40 mg) by mouth daily  Dispense: 90 tablet; Refill: 3    6. Benign prostatic hyperplasia with lower urinary tract symptoms, symptom details unspecified  Stable symptoms  He may follow up with Dr. Barrios, we decided to check PSA today     7. Screening for prostate cancer    - Prostate spec antigen screen    8. Hypothyroidism, unspecified type    - TSH with free T4 reflex    9. Urinary incontinence, unspecified type    - oxybutynin ER (DITROPAN XL) 5 MG 24 hr tablet; Take 1 tablet (5 mg) by mouth daily  Dispense: 90 tablet; Refill: 3    End of Life Planning:  Patient currently has an advanced directive: Yes.  Practitioner is supportive of decision.    COUNSELING:  Reviewed preventive health counseling, as reflected in patient instructions  Special attention given to:       Consider AAA screening for ages 65-75 and smoking history; done       Regular exercise       Healthy diet/nutrition       Immunizations    Reminded to complete shingrix series              Hepatitis C screening; done        Consider lung cancer screening for ages 55-80 years and 30 pack-year smoking history; never smoker        Colon cancer screening; repeat in 10/2020       Prostate cancer screening; PSA today     Estimated body mass index is 29.72 kg/m  as calculated from the following:    Height as of this encounter: 1.759 m (5' 9.25\").    Weight as of this encounter: 91.9 kg (202 lb 11.2 oz).    Weight management plan: Discussed healthy diet and exercise " guidelines     reports that he has never smoked. He has never used smokeless tobacco.      Appropriate preventive services were discussed with this patient, including applicable screening as appropriate for cardiovascular disease, diabetes, osteopenia/osteoporosis, and glaucoma.  As appropriate for age/gender, discussed screening for colorectal cancer, prostate cancer, breast cancer, and cervical cancer. Checklist reviewing preventive services available has been given to the patient.    Reviewed patients plan of care and provided an AVS. The Basic Care Plan (routine screening as documented in Health Maintenance) for Dionte meets the Care Plan requirement. This Care Plan has been established and reviewed with the Patient.    Counseling Resources:  ATP IV Guidelines  Pooled Cohorts Equation Calculator  Breast Cancer Risk Calculator  FRAX Risk Assessment  ICSI Preventive Guidelines  Dietary Guidelines for Americans, 2010  USDA's MyPlate  ASA Prophylaxis  Lung CA Screening    Romeo Fonseca MD  [facility]    Identified Health Risks:

## 2019-07-12 LAB
ALBUMIN SERPL-MCNC: 4.1 G/DL (ref 3.4–5)
ALP SERPL-CCNC: 77 U/L (ref 40–150)
ALT SERPL W P-5'-P-CCNC: 45 U/L (ref 0–70)
ANION GAP SERPL CALCULATED.3IONS-SCNC: 7 MMOL/L (ref 3–14)
AST SERPL W P-5'-P-CCNC: 30 U/L (ref 0–45)
BILIRUB SERPL-MCNC: 0.4 MG/DL (ref 0.2–1.3)
BUN SERPL-MCNC: 16 MG/DL (ref 7–30)
CALCIUM SERPL-MCNC: 9 MG/DL (ref 8.5–10.1)
CHLORIDE SERPL-SCNC: 104 MMOL/L (ref 94–109)
CHOLEST SERPL-MCNC: 139 MG/DL
CO2 SERPL-SCNC: 26 MMOL/L (ref 20–32)
CREAT SERPL-MCNC: 0.93 MG/DL (ref 0.66–1.25)
GFR SERPL CREATININE-BSD FRML MDRD: 84 ML/MIN/{1.73_M2}
GLUCOSE SERPL-MCNC: 115 MG/DL (ref 70–99)
HDLC SERPL-MCNC: 37 MG/DL
LDLC SERPL CALC-MCNC: 24 MG/DL
NONHDLC SERPL-MCNC: 102 MG/DL
POTASSIUM SERPL-SCNC: 4.4 MMOL/L (ref 3.4–5.3)
PROT SERPL-MCNC: 7.5 G/DL (ref 6.8–8.8)
PSA SERPL-ACNC: 3.58 UG/L (ref 0–4)
SODIUM SERPL-SCNC: 137 MMOL/L (ref 133–144)
TRIGL SERPL-MCNC: 388 MG/DL
TSH SERPL DL<=0.005 MIU/L-ACNC: 2.96 MU/L (ref 0.4–4)

## 2019-07-12 RX ORDER — ATORVASTATIN CALCIUM 20 MG/1
20 TABLET, FILM COATED ORAL DAILY
Qty: 90 TABLET | Refills: 3 | Status: SHIPPED | OUTPATIENT
Start: 2019-07-12 | End: 2020-05-19

## 2019-07-12 ASSESSMENT — ASTHMA QUESTIONNAIRES: ACT_TOTALSCORE: 21

## 2019-07-12 NOTE — RESULT ENCOUNTER NOTE
"The following letter pertains to your most recent diagnostic tests:    -Your prostate specific antigen (PSA) test result returned normal.     -TSH (thyroid stimulating hormone) level is normal which indicates normal circulating thyroid hormone levels.      -Your total cholesterol is 139 which is at your goal of total cholesterol less than 200.    -Your triglycerides are 388 which are above your goal of triglycerides less than 150.    -Your HDL or \"good cholesterol\" is 37 which is below your goal of HDL cholesterol greater than 40.    -Your LDL cholesterol or \"bad cholesterol\" is 24 which is below your goal of LDL cholesterol less than <100 but greater than 40.  Your LDL goal is based on your risk factors for artery disease.     -Liver and gallbladder tests are normal for you. (ALT,AST, Alk phos, bilirubin), kidney function is normal for you (Creatinine, GFR), Sodium is normal, Potassium is normal for you, Calcium is normal for you, Glucose (blood sugar) is mildly elevated      -Your complete blood counts including your hemoglobin returned normal for you.         Bottom line:  Your LDL cholesterol probably lower than you need it to be.  You can reduce your atorvastatin (Lipitor) dose from 40 mg daily to 20 mg daily to address this.  I sent an prescription for 20mg tablets to your pharmacy.  atorvastatin (Lipitor) does not affect your triglycerides.  The way to lower triglycerides is by educing carbohydrates and fats in your diet, losing weight and consuming less alcohol.  Increasing exercise can improve HDL (\"good cholesterol\") levels.  A good target to shoot for is 30 minutes of aerobic activity at least 4 days per week.  The rest of the labs look OK.          Follow up:  Schedule an appointment for a physical examination with fasting blood tests in one year's time, or return sooner if new questions, symptoms or problems arise.       Sincerely,    Dr. Fonseca"

## 2019-07-16 ENCOUNTER — TELEPHONE (OUTPATIENT)
Dept: FAMILY MEDICINE | Facility: CLINIC | Age: 69
End: 2019-07-16

## 2019-07-16 NOTE — TELEPHONE ENCOUNTER
Reason for Call:  Med Clarification     Detailed comments: Umu with [facility] is calling to verify therapy on Rx for Lipitor       Ph/ [phone]        Can we leave a detailed message on this number? NO    Call taken on 7/16/2019 at 2:26 PM by Chika Garcia

## 2019-07-16 NOTE — TELEPHONE ENCOUNTER
"Clarification made per 's result note on 7-12-19 please see below.    Bottom line:  Your LDL cholesterol probably lower than you need it to be.  You can reduce your atorvastatin (Lipitor) dose from 40 mg daily to 20 mg daily to address this.  I sent an prescription for 20mg tablets to your pharmacy.  atorvastatin (Lipitor) does not affect your triglycerides.  The way to lower triglycerides is by educing carbohydrates and fats in your diet, losing weight and consuming less alcohol.  Increasing exercise can improve HDL (\"good cholesterol\") levels.  A good target to shoot for is 30 minutes of aerobic activity at least 4 days per week.  The rest of the labs look OK.        Ashley Ludwig CMA    "

## 2019-10-03 ENCOUNTER — HEALTH MAINTENANCE LETTER (OUTPATIENT)
Age: 69
End: 2019-10-03

## 2019-10-10 DIAGNOSIS — Z76.89 HEALTH CARE HOME: Primary | ICD-10-CM

## 2019-10-11 ENCOUNTER — PATIENT OUTREACH (OUTPATIENT)
Dept: CARE COORDINATION | Facility: CLINIC | Age: 69
End: 2019-10-11

## 2019-10-11 DIAGNOSIS — J18.9 PNEUMONIA: Primary | ICD-10-CM

## 2019-10-11 ASSESSMENT — ACTIVITIES OF DAILY LIVING (ADL): DEPENDENT_IADLS:: INDEPENDENT

## 2019-10-11 NOTE — LETTER
[facility]  [address]  [place] [address]    October 11, 2019    Dionte Mackey  [address]  [place] [address]      Dear Dionte,    I am a clinic care coordinator who works with Romeo Fonseca MD at [facility] . I wanted to thank you for spending the time to talk with me.  I wanted to introduce myself and provide you with my contact information so that you can call me with questions or concerns about your health care. Below is a description of clinic care coordination and how I can further assist you.     The clinic care coordinator is a registered nurse and/or  who understand the health care system. The goal of clinic care coordination is to help you manage your health and improve access to the [facility] system in the most efficient manner. The registered nurse can assist you in meeting your health care goals by providing education, coordinating services, and strengthening the communication among your providers. The  can assist you with financial, behavioral, psychosocial, chemical dependency, counseling, and/or psychiatric resources.    Please feel free to contact me at [phone], with any questions or concerns. We at [facility] are focused on providing you with the highest-quality healthcare experience possible and that all starts with you.     Sincerely,     [facility]     Charisse Wick  RN Care Coordinator   [facility] / [facility]   Phone: [phone]  Email :  [email or web address]      Enclosed: I have enclosed a copy of the Complex Care Plan. This has helpful information and goals that we have talked about. Please keep this in an easy to access place to use as needed.

## 2019-10-11 NOTE — PROGRESS NOTES
Clinic Care Coordination Contact    Clinic Care Coordination Contact  OUTREACH    Referral Information:  Referral Source: Care Team    Primary Diagnosis: Pneumonia    Chief Complaint   Patient presents with     Clinic Care Coordination - Post Hospital     Clinic Care Coordination RN         Universal Utilization: ED visit 10/9  [facility]    [address] [place] [address]    [phone]    Pneumonia of left lower lobe due to infectious organism (HC) (Primary Dx);   Exacerbation of persistent asthma, unspecified asthma severity    10/9/ [facility] emergency /Urgent care   Discharge Instructions  - documented in this encounter  Instructions  Zarina Griffith PA - 10/09/2019    Get plenty of rest.    Increase your fluid intake to stay hydrated.    You may take 600-800 mg Ibuprofen three times a day as needed for pain/fever. Max dose of Ibuprofen is 3200 mg in a 24-hour period. Should be taken with food. Long term use increases risk of stomach irritation, bleeding, or ulcer formation. Return to emergency department if blood in stools, dark black tarry stools, etc. Avoid taking Ibuprofen if you have kidney disease. If you are not able to take ibuprofen, you may take 1 to 2 tablets of extra strength Tylenol by mouth every 4 hours as needed. Max dose of Tylenol is 4000 mg in a 24-hour period. Avoid taking Tylenol if you have liver disease.     You can continue Mucinex for relief of the cough.    Start Doxycycline 100 mg, 1 tab twice daily for 7 days.    Continue your Albuterol Inhaler and nebulizer treatments every 4-6 hours as needed for shortness of breath/cough.    Start Prednisone 20 mg, 1 tab twice daily for 5 days - take with food.    Follow up with your primary provider if symptoms are not improving over the next 3 days, sooner if worsening or new symptoms (fever, worsening shortness of breath, chest pain not related to the cough, dizziness, worsening headache, vomiting, inability to  swallow) develop.     Clinic Utilization  Difficulty keeping appointments:: No  Compliance Concerns: No  No-Show Concerns: No  No PCP office visit in Past Year: No  Utilization    Last refreshed: 10/11/2019  9:20 AM:  Hospital Admissions 0           Last refreshed: 10/11/2019  9:20 AM:  ED Visits 0           Last refreshed: 10/11/2019  9:20 AM:  No Show Count (past year) 0              Current as of: 10/11/2019  9:20 AM              Clinical Concerns:  Pain  Pain (GOAL):: No  Health Maintenance Reviewed: Due/Overdue   Health Maintenance Due   Topic Date Due     ADVANCE CARE PLANNING  1950     DEPRESSION ACTION PLAN  1950     ZOSTER IMMUNIZATION (1 of 2) 08/02/2000     ASTHMA ACTION PLAN  09/13/2018     MEDICARE ANNUAL WELLNESS VISIT  06/01/2019     INFLUENZA VACCINE (1) 09/01/2019     Clinical Pathway: Clinic Care Coordination Pneumonia Assessment  Discharge:        Day of hospital discharge: 10/9/2019    What recommendations were made for follow up after your recent  hospitalization? Antibiotic and Prednisone treatment     Have the follow up appointments been scheduled? No    If not, can I help you set up these appointments? No    Transportation concerns (GOAL):: No    Is there a referral/need for Pulmonary Rehab? No    Is the patient enrolled in [employer] Tele-Assurance program? No    Symptom Review:    How have you been feeling now that you are home?  Tired but better     Are you having any increased shortness of breath? No    Are you having any fevers? No    When you cough, are you coughing up anything? Yes    Color Green no change     Consistency thick   Frequency intermittent   Medications:     Were you started on any new medications?  Yes, Doxycycline/Prednisone     Were any of your previous medications changed? No    Do you have all of your medications? Yes,     Have you had trouble filling your prescriptions? No    Are your medications effective in controlling your symptoms? Yes,     Are you  currently on Prednisone (* does pt understand the tapering instructions)?  Yes    Was MTM or Diabetic Education referral placed (*Make sure to put transitions as reason for referral)? No    Oxygen/DME    Are you currently on oxygen? No     Review with patient how to use/maintain nebulizers and inhalers:yes  Activity:    Have you had to reduce your activities because of dyspnea or other symptoms?  No resting and pacing activity     Were you instructed on how to cough and deep breathe? Yes, Reviewed again today   Emotions/Lifestyle:      Do you smoke? reports that he has never smoked. He has never used smokeless tobacco.    Functional Status:  Dependent ADLs:: Independent  Dependent IADLs:: Independent  Bed or wheelchair confined:: No  Mobility Status: Independent    Living Situation:  Current living arrangement:: I live in a private home with spouse    Diet/Exercise/Sleep:  Inadequate nutrition (GOAL):: No  Food Insecurity: No  Tube Feeding: No  Exercise:: Currently not exercising  Inadequate activity/exercise (GOAL):: No  Significant changes in sleep pattern (GOAL): No    Transportation:  Transportation concerns (GOAL):: No        Psychosocial:  Episcopal or spiritual beliefs that impact treatment:: No  Mental health DX:: Yes  Mental health DX how managed:: Medication  Mental health management concern (GOAL):: No  Informal Support system:: Spouse     Financial/Insurance:   Financial/Insurance concerns (GOAL):: No  Community Resources: None  Supplies used at home:: Nebulizer tubing       Advance Care Plan/Directive  Advanced Care Plans/Directives on file:: No  Type Advanced Care Plans/Directives: (NA)  Advanced Care Plan/Directive Status: Not Applicable    Referrals Placed: None     Goals:   Goals        General    Medical (pt-stated)     Notes - Note created  10/11/2019 10:14 AM by Charisse Wick RN    Goal Statement: I will seek medical help if increased shortness of breath symptoms wheezing change in the color of  what I am cough up or fever   Measure of Success: No hospitalizations in the next month and I will be back to my routine at home with more energy   Supportive Steps to Achieve:   I will finish course of Doxycycline and Prednisone  I will use Nebulizer inhaler as directed   I will cough deep breathe and drink plenty of water to decrease my secretions   I will make a hospital follow up with provider in the next 7-10 days   Barriers: None identified   Strengths: Agrees with the plan   Date to Achieve By: 11/10/2019  Patient expressed understanding of goal: Yes                Patient/Caregiver understanding: Expresses good understanding of discharge instructions     Outreach Frequency: weekly      Plan:   Patient will make a hospital follow up in 7-10 days   CC will follow up in 3-5 business days     [facility]     Charisse Wick  RN Care Coordinator   [facility] / [facility]   Phone: [phone]  Email :  [email or web address]

## 2019-10-11 NOTE — LETTER
[facility] Home  Complex Care Plan  About Me:    Patient Name:  Dionte Mackey    YOB: 1950  Age:         69 year old   [place] MRN:    [id number] Telephone Information:  Home Phone [phone]   Mobile [phone]       Address:  [address] [place] [address] Email address:  [email or web address]      Emergency Contact(s)    Name Relationship Lgl Grd Work Phone Home Phone Mobile Phone   1. OTISMICHAEL Spouse No none [phone]           Primary language:  English     needed? No   [employer]:  [phone] op. 1  Other communication barriers: None  Preferred Method of Communication:  Emilia  Current living arrangement: I live in a private home with spouse  Mobility Status/ Medical Equipment: Independent    Health Maintenance  Health Maintenance Reviewed: Due/Overdue   Health Maintenance Due   Topic Date Due     ADVANCE CARE PLANNING  1950     DEPRESSION ACTION PLAN  1950     ZOSTER IMMUNIZATION (1 of 2) 08/02/2000     ASTHMA ACTION PLAN  09/13/2018     MEDICARE ANNUAL WELLNESS VISIT  06/01/2019     INFLUENZA VACCINE (1) 09/01/2019       My Access Plan  Medical Emergency 911   Primary Clinic Line [facility] - [phone]   24 Hour Appointment Line [phone] or  [phone]) (toll-free)   24 Hour Nurse Line [phone] (toll-free)   Preferred Urgent Care [facility], [phone]   Preferred Hospital Other   Preferred Pharmacy [facility] [place], [place] - [address] [place] [address]     Behavioral Health Crisis Line The National Suicide Prevention Lifeline at 1-387.791.2745 or 911             My Care Team Members  Patient Care Team       Relationship Specialty Notifications Start End    Romeo Fonseca MD PCP - General Internal Medicine  7/19/17     Phone: [phone] Pager: [phone] Fax: [phone] [address] [place] [address] [place] [address]    Raymundo  Romeo WHITNEY MD Assigned PCP   11/11/16     Phone: [phone] Pager: [phone] Fax: [phone] [address] [facility] [address] [place] [address]    Charisse Wick, RN Lead Care Coordinator Primary Care - CC Admissions 10/11/19     Phone: [phone] Fax: [phone]                My Care Plans  Self Management and Treatment Plan  Goals and (Comments)  Goals        General    Medical (pt-stated)     Notes - Note created  10/11/2019 10:14 AM by Charisse Wick, RN    Goal Statement: I will seek medical help if increased shortness of breath symptoms wheezing change in the color of what I am cough up or fever   Measure of Success: No hospitalizations in the next month and I will be back to my routine at home with more energy   Supportive Steps to Achieve:   I will finish course of Doxycycline and Prednisone  I will use Nebulizer inhaler as directed   I will cough deep breathe and drink plenty of water to decrease my secretions   I will make a hospital follow up with provider in the next 7-10 days   Barriers: None identified   Strengths: Agrees with the plan   Date to Achieve By: 11/10/2019  Patient expressed understanding of goal: Yes               Action Plans on File:   Asthma                   Advance Care Plans/Directives Type: None  Type Advanced Care Plans/Directives: (NA)    My Medical and Care Information  Problem List   Patient Active Problem List   Diagnosis     BPH (benign prostatic hyperplasia)     Hematuria     Benign essential hypertension     Seasonal allergic rhinitis     Moderate persistent asthma without complication     Vasculogenic erectile dysfunction     Herpes zoster without complication     Recurrent major depressive disorder, in full remission (H)     Non morbid obesity due to excess calories     Hyperlipidemia LDL goal <130      Current Medications and Allergies:  See printed Medication Report.    Care Coordination Start Date: 10/11/2019   Frequency of Care Coordination: weekly   Form Last  Updated: 10/11/2019

## 2019-10-16 ENCOUNTER — OFFICE VISIT (OUTPATIENT)
Dept: FAMILY MEDICINE | Facility: CLINIC | Age: 69
End: 2019-10-16
Payer: COMMERCIAL

## 2019-10-16 VITALS
DIASTOLIC BLOOD PRESSURE: 70 MMHG | TEMPERATURE: 97.9 F | WEIGHT: 202 LBS | OXYGEN SATURATION: 98 % | BODY MASS INDEX: 29.62 KG/M2 | HEART RATE: 80 BPM | SYSTOLIC BLOOD PRESSURE: 120 MMHG

## 2019-10-16 DIAGNOSIS — R05.9 COUGH: ICD-10-CM

## 2019-10-16 DIAGNOSIS — J18.9 PNEUMONIA OF LEFT LOWER LOBE DUE TO INFECTIOUS ORGANISM: Primary | ICD-10-CM

## 2019-10-16 PROCEDURE — 99213 OFFICE O/P EST LOW 20 MIN: CPT | Performed by: NURSE PRACTITIONER

## 2019-10-16 RX ORDER — BENZONATATE 200 MG/1
200 CAPSULE ORAL 3 TIMES DAILY PRN
Qty: 20 CAPSULE | Refills: 1 | Status: SHIPPED | OUTPATIENT
Start: 2019-10-16 | End: 2022-08-16

## 2019-10-16 NOTE — PROGRESS NOTES
Subjective     Dionte Mackey is a 69 year old male who presents to clinic today for the following health issues:      ED/UC Followup:    Facility:  [facility]   Date of visit: 10/9  Reason for visit: pneumonia   Current Status: finished full course of abx and prednisone - felt slightly better, but is still coughing quite a bit - has to sleep in a recliner . Has been using his neb and inhaler - provides relief      HPI: Andres presents today for pneumonia follow up. A week ago, he was diagnosed with LLL pneumonia at a local urgent care. He was prescribed doxycycline (finished this morning). Subjectively, he feels somewhat better, but he notes that he is still struggling with energy level, productive cough, SOB. No fever or chills recently. Sputum production is getting better. He does have asthma, which is obviously flared in this setting. Albuterol is helpful.     Patient Active Problem List   Diagnosis     BPH (benign prostatic hyperplasia)     Hematuria     Benign essential hypertension     Seasonal allergic rhinitis     Moderate persistent asthma without complication     Vasculogenic erectile dysfunction     Herpes zoster without complication     Recurrent major depressive disorder, in full remission (H)     Non morbid obesity due to excess calories     Hyperlipidemia LDL goal <130     Past Surgical History:   Procedure Laterality Date     BACK SURGERY      cervical fusion,neck fx repair     COLONOSCOPY       CYSTOSCOPY, TRANSURETHRAL RESECTION (TUR) PROSTATE, COMBINED N/A 4/3/2015    Procedure: COMBINED CYSTOSCOPY, TRANSURETHRAL RESECTION (TUR) PROSTATE;  Surgeon: Bakari Barrios MD;  Location:  OR     ENT SURGERY      SINUS SURGERY X 3,SEPTOPLASTY,TONSILLECTOMY     EYE SURGERY  11/10/2017     FOOT SURGERY Right      GENITOURINARY SURGERY      TURP IN 2010     HEAD & NECK SURGERY  10/15/2017     TONSILLECTOMY         Social History     Tobacco Use     Smoking status: Never Smoker     Smokeless tobacco: Never  Used   Substance Use Topics     Alcohol use: Yes     Alcohol/week: 3.0 - 4.0 standard drinks     Comment: 3 drinks on weekend days     Family History   Problem Relation Age of Onset     Colon Cancer Father 70     Coronary Artery Disease No family hx of      Cerebrovascular Disease No family hx of      Diabetes No family hx of            Reviewed and updated as needed this visit by Provider  Tobacco  Allergies  Meds  Problems  Med Hx  Surg Hx  Fam Hx         Review of Systems   ROS COMP: Constitutional, HEENT, pulmonary systems are negative, except as otherwise noted.      Objective    /70 (BP Location: Right arm, Patient Position: Chair, Cuff Size: Adult Regular)   Pulse 80   Temp 97.9  F (36.6  C) (Tympanic)   Wt 91.6 kg (202 lb)   SpO2 98%   BMI 29.62 kg/m    Body mass index is 29.62 kg/m .  Physical Exam   GENERAL: alert and no distress  HENT: ear canals normal; TMs- bilaterally with bulge and mucoid fluid noted behind; nose and mouth without ulcers or lesions  NECK: no adenopathy, no asymmetry, masses, or scars and thyroid normal to palpation  RESP: Expiratory wheezes noted globally; LLL slightly quieter than other fields; Harsh cough; Chest excursion, respiratory rate, and ventilatory effort / ease within normal limits. No indicators of respiratory distress.  CV: regular rate and rhythm, normal S1 S2, no S3 or S4, no murmur, click or rub, no peripheral edema and peripheral pulses strong  NEURO: Normal strength and tone, mentation intact and speech normal    Diagnostic Test Results:  none         Assessment & Plan     Dionte was seen today for hospital f/u.    Diagnoses and all orders for this visit:    Pneumonia of left lower lobe due to infectious organism (H)  Comment: No red flag findings. Vitally-stable, afebrile, non-toxic in appearance, and with good oxygenation today. History and exam suggest incompletely resolved PNA. Will add Augmentin today for 7 days. Symptomatic cares (see patient  "instructions) and follow up precautions discussed in detail. Andres acknowledges and demonstrates understanding of circumstances under which care should be sought urgently or emergently. Follow up as discussed. Discussed risks, benefits, alternatives, potential side effects, and proper administration of new medication / treatment. Agrees with plan of care. All questions answered.   -     amoxicillin-clavulanate (AUGMENTIN) 875-125 MG tablet; Take 1 tablet by mouth 2 times daily for 7 days    Cough  -     benzonatate (TESSALON) 200 MG capsule; Take 1 capsule (200 mg) by mouth 3 times daily as needed for cough         BMI:   Estimated body mass index is 29.62 kg/m  as calculated from the following:    Height as of 7/11/19: 1.759 m (5' 9.25\").    Weight as of this encounter: 91.6 kg (202 lb).           See Patient Instructions    Return in about 1 week (around 10/23/2019) for persistent or worsening symptoms.    Demetri Noe NP  [facility]      "

## 2019-10-21 ENCOUNTER — PATIENT OUTREACH (OUTPATIENT)
Dept: CARE COORDINATION | Facility: CLINIC | Age: 69
End: 2019-10-21

## 2019-10-21 DIAGNOSIS — J18.9 PNEUMONIA: Primary | ICD-10-CM

## 2019-10-21 ASSESSMENT — ACTIVITIES OF DAILY LIVING (ADL): DEPENDENT_IADLS:: INDEPENDENT

## 2019-10-21 NOTE — PROGRESS NOTES
Clinic Care Coordination Contact    Follow Up Progress Note      Assessment: Patient states he is slowly getting better   Clinic Care Coordination Pneumonia Follow up Assessment  Symptom Review:    Are you having any increased shortness of breath? No    Are you having any fevers? No    When you cough, are you coughing up anything? Yes  o Consistency thinner and lighter sputum Frequency Intermittent    Medications:     Are your medications effective in controlling your symptoms? Yes,     Are you still on Prednisone (* does pt understand the tapering instructions)?  No    Have you completed your antibiotic and/or prednisone course? On Augmenting until 10/23  Oxygen/DME--NA  Activity:    How much activity can you do before you are SOB? Pacing activity.  No longer sleeping in the recliner back to his bed     Goals addressed this encounter:   Goals Addressed                 This Visit's Progress      Medical (pt-stated)   On track     Goal Statement: I will seek medical help if increased shortness of breath symptoms wheezing change in the color of what I am cough up or fever   Measure of Success: No hospitalizations in the next month and I will be back to my routine at home with more energy   Supportive Steps to Achieve:   I will finish course of Doxycycline and Prednisone  I will use Nebulizer inhaler as directed   I will cough deep breathe and drink plenty of water to decrease my secretions   I will make a hospital follow up with provider in the next 7-10 days   Barriers: None identified   Strengths: Agrees with the plan   Date to Achieve By: 11/10/2019  Patient expressed understanding of goal: Yes               Intervention/Education provided during outreach: CC encouraged taking deep breaths coughing and drinking plenty of water  Patient agrees with the plan    Outreach Frequency: weekly    Plan:   CC will follow up in 3-5 business days   [facility]     Charisse Wick  RN Care Coordinator   [facility] /  [facility]   Phone: [phone]  Email :  [email or web address]

## 2019-10-30 ENCOUNTER — PATIENT OUTREACH (OUTPATIENT)
Dept: CARE COORDINATION | Facility: CLINIC | Age: 69
End: 2019-10-30

## 2019-10-30 DIAGNOSIS — J18.9 PNEUMONIA: Primary | ICD-10-CM

## 2019-10-30 ASSESSMENT — ACTIVITIES OF DAILY LIVING (ADL): DEPENDENT_IADLS:: INDEPENDENT

## 2019-10-30 NOTE — PROGRESS NOTES
Clinic Care Coordination Contact    Follow Up Progress Note   ED visit 10/9  [facility]    [address] [place] [address]    [phone]    Pneumonia of left lower lobe due to infectious organism (HC) (Primary Dx);   Exacerbation of persistent asthma, unspecified asthma severity     10/9/ [facility] emergency /Urgent care      Assessment:   Clinic Care Coordination Pneumonia Follow up Assessment  Symptom Review:    Are you having any increased shortness of breath? No    Are you having any fevers? No    When you cough, are you coughing up anything? No    Medications:     Were you started on any new medications since the last time we talked?  Yes, Saw  Provider at the [facility] for additional prednisone and antibiotics     Are your medications effective in controlling your symptoms? Yes,     Are you still on Prednisone (* does pt understand the tapering instructions)?  No    Have you completed your antibiotic and/or prednisone course? Yes,   Activity:    Have you had to reduce your activities because of dyspnea or other symptoms? No out blowing leaves today  o   Goals addressed this encounter:   Goals Addressed                 This Visit's Progress      COMPLETED: Medical (pt-stated)        Goal Statement: I will seek medical help if increased shortness of breath symptoms wheezing change in the color of what I am cough up or fever   Measure of Success: No hospitalizations in the next month and I will be back to my routine at home with more energy   Supportive Steps to Achieve:   I will finish course of Doxycycline and Prednisone  I will use Nebulizer inhaler as directed   I will cough deep breathe and drink plenty of water to decrease my secretions   I will make a hospital follow up with provider in the next 7-10 days   Barriers: None identified   Strengths: Agrees with the plan   Date to Achieve By: 11/10/2019  Patient expressed understanding of goal: Yes                Intervention/Education provided during outreach: None     Outreach Frequency: weekly    Plan:   No unmet needs, no further care coordination needed at this time   [facility]     Charisse Wick  RN Care Coordinator   [facility] / [facility]   Phone: [phone]  Email :  [email or web address]

## 2020-02-08 ENCOUNTER — HEALTH MAINTENANCE LETTER (OUTPATIENT)
Age: 70
End: 2020-02-08

## 2020-03-07 DIAGNOSIS — J45.40 MODERATE PERSISTENT ASTHMA WITHOUT COMPLICATION: ICD-10-CM

## 2020-03-09 NOTE — TELEPHONE ENCOUNTER
"albuterol (PROAIR HFA) 108 (90 Base) MCG/ACT inhaler  8.5 g  11  7/11/2019   No    Sig: USE 2 INHALATIONS EVERY 6 HOURS     Last Written Prescription Date:  07/11/2019  Last Fill Quantity: 8.5 g,  # refills: 11   Last office visit: 10/16/2019 with prescribing provider:     Future Office Visit:       Disp  Refills  Start  End  BARBARA    mometasone-formoterol (DULERA) 200-5 MCG/ACT inhaler  13 g  3  7/11/2019   No    Sig: USE 2 INHALATIONS TWICE A DAY        Last Written Prescription Date:  07/11/2019  Last Fill Quantity: 13 g,  # refills: 3   Last office visit: 10/16/2019 with prescribing provider:     Future Office Visit:        Requested Prescriptions   Pending Prescriptions Disp Refills     mometasone-formoterol (DULERA) 200-5 MCG/ACT inhaler [Pharmacy Med Name: DULERA INHALER 13GM 200/5] 13 g 11     Sig: USE 2 INHALATIONS TWICE A DAY       Inhaled Steroids Protocol Failed - 3/7/2020 12:27 PM        Failed - Asthma control assessment score within normal limits in last 6 months     Please review ACT score.           Passed - Patient is age 12 or older        Passed - Medication is active on med list        Passed - Recent (6 mo) or future (30 days) visit within the authorizing provider's specialty     Patient had office visit in the last 6 months or has a visit in the next 30 days with authorizing provider or within the authorizing provider's specialty.  See \"Patient Info\" tab in inbasket, or \"Choose Columns\" in Meds & Orders section of the refill encounter.           Long-Acting Beta Agonist Inhalers Protocol  Failed - 3/7/2020 12:27 PM        Failed - Asthma control assessment score within normal limits in last 6 months     Please review ACT score.           Failed - Order for Serevent, Striverdi, or Foradil and pt has steroid inhaler        Passed - Patient is age 12 or older        Passed - Medication is active on med list        Passed - Recent (6 mo) or future (30 days) visit within the authorizing provider's " "specialty     Patient had office visit in the last 6 months or has a visit in the next 30 days with authorizing provider or within the authorizing provider's specialty.  See \"Patient Info\" tab in inbasket, or \"Choose Columns\" in Meds & Orders section of the refill encounter.               albuterol (PROAIR HFA/PROVENTIL HFA/VENTOLIN HFA) 108 (90 Base) MCG/ACT inhaler [Pharmacy Med Name: ALBUTEROL HFA INHALER 8.5GM 90MCG] 8.5 g 13     Sig: USE 2 INHALATIONS EVERY 6 HOURS       Asthma Maintenance Inhalers - Anticholinergics Failed - 3/7/2020 12:27 PM        Failed - Asthma control assessment score within normal limits in last 6 months     Please review ACT score.           Passed - Patient is age 12 years or older        Passed - Medication is active on med list        Passed - Recent (6 mo) or future (30 days) visit within the authorizing provider's specialty     Patient had office visit in the last 6 months or has a visit in the next 30 days with authorizing provider or within the authorizing provider's specialty.  See \"Patient Info\" tab in inbasket, or \"Choose Columns\" in Meds & Orders section of the refill encounter.           Short-Acting Beta Agonist Inhalers Protocol  Failed - 3/7/2020 12:27 PM        Failed - Asthma control assessment score within normal limits in last 6 months     Please review ACT score.           Passed - Patient is age 12 or older        Passed - Medication is active on med list        Passed - Recent (6 mo) or future (30 days) visit within the authorizing provider's specialty     Patient had office visit in the last 6 months or has a visit in the next 30 days with authorizing provider or within the authorizing provider's specialty.  See \"Patient Info\" tab in inbasket, or \"Choose Columns\" in Meds & Orders section of the refill encounter.                 "

## 2020-03-10 RX ORDER — ALBUTEROL SULFATE 90 UG/1
AEROSOL, METERED RESPIRATORY (INHALATION)
Qty: 25.5 G | Refills: 0 | Status: SHIPPED | OUTPATIENT
Start: 2020-03-10 | End: 2020-05-19

## 2020-03-10 RX ORDER — MOMETASONE FUROATE AND FORMOTEROL FUMARATE DIHYDRATE 200; 5 UG/1; UG/1
AEROSOL RESPIRATORY (INHALATION)
Qty: 39 G | Refills: 0 | Status: SHIPPED | OUTPATIENT
Start: 2020-03-10 | End: 2020-05-19

## 2020-03-10 NOTE — TELEPHONE ENCOUNTER
Medication filled 1 time as pt is due for a follow-up in clinic. Please have patient call [phone] to schedule.

## 2020-05-18 DIAGNOSIS — E78.5 HYPERLIPIDEMIA LDL GOAL <130: ICD-10-CM

## 2020-05-18 DIAGNOSIS — F33.42 RECURRENT MAJOR DEPRESSIVE DISORDER, IN FULL REMISSION (H): ICD-10-CM

## 2020-05-18 DIAGNOSIS — J45.40 MODERATE PERSISTENT ASTHMA WITHOUT COMPLICATION: ICD-10-CM

## 2020-05-18 DIAGNOSIS — I10 BENIGN ESSENTIAL HYPERTENSION: ICD-10-CM

## 2020-05-19 RX ORDER — BUPROPION HYDROCHLORIDE 150 MG/1
TABLET ORAL
Qty: 90 TABLET | Refills: 3 | Status: SHIPPED | OUTPATIENT
Start: 2020-05-19 | End: 2021-08-06

## 2020-05-19 RX ORDER — HYDROCHLOROTHIAZIDE 25 MG/1
TABLET ORAL
Qty: 90 TABLET | Refills: 1 | Status: SHIPPED | OUTPATIENT
Start: 2020-05-19 | End: 2020-11-09

## 2020-05-19 RX ORDER — ATORVASTATIN CALCIUM 20 MG/1
TABLET, FILM COATED ORAL
Qty: 90 TABLET | Refills: 1 | Status: SHIPPED | OUTPATIENT
Start: 2020-05-19 | End: 2020-11-09

## 2020-05-19 RX ORDER — SERTRALINE HYDROCHLORIDE 100 MG/1
TABLET, FILM COATED ORAL
Qty: 180 TABLET | Refills: 3 | Status: SHIPPED | OUTPATIENT
Start: 2020-05-19 | End: 2021-08-06

## 2020-05-19 RX ORDER — MOMETASONE FUROATE AND FORMOTEROL FUMARATE DIHYDRATE 200; 5 UG/1; UG/1
AEROSOL RESPIRATORY (INHALATION)
Qty: 39 G | Refills: 3 | Status: SHIPPED | OUTPATIENT
Start: 2020-05-19 | End: 2021-08-06

## 2020-05-19 RX ORDER — ALBUTEROL SULFATE 90 UG/1
AEROSOL, METERED RESPIRATORY (INHALATION)
Qty: 25.5 G | Refills: 3 | Status: SHIPPED | OUTPATIENT
Start: 2020-05-19 | End: 2020-08-28

## 2020-05-19 RX ORDER — LISINOPRIL 30 MG/1
TABLET ORAL
Qty: 90 TABLET | Refills: 1 | Status: SHIPPED | OUTPATIENT
Start: 2020-05-19 | End: 2020-11-09

## 2020-05-19 NOTE — TELEPHONE ENCOUNTER
Routing refill request to provider for review/approval because:  Labs not current:  ACT, phq9    JOY YouN, RN  [employer] [facility]

## 2020-05-19 NOTE — TELEPHONE ENCOUNTER
Filled per [facility] protocol. Pharmacy change    JOY ArriazaN, RN  [employer] [facility]

## 2020-07-06 ENCOUNTER — E-VISIT (OUTPATIENT)
Dept: FAMILY MEDICINE | Facility: CLINIC | Age: 70
End: 2020-07-06
Payer: COMMERCIAL

## 2020-07-06 DIAGNOSIS — R05.9 COUGH: Primary | ICD-10-CM

## 2020-07-06 PROCEDURE — 99421 OL DIG E/M SVC 5-10 MIN: CPT | Performed by: INTERNAL MEDICINE

## 2020-07-06 RX ORDER — CHLORPHENIRAMINE MALEATE AND DEXTROMETHORPHAN HYDROBROMIDE 4; 30 MG/1; MG/1
1 TABLET, FILM COATED ORAL EVERY 6 HOURS PRN
Qty: 30 TABLET | Refills: 3 | Status: SHIPPED | OUTPATIENT
Start: 2020-07-06 | End: 2022-08-16

## 2020-07-06 NOTE — PATIENT INSTRUCTIONS
Thank you for choosing us for your care. I have placed an order for a prescription so that you can start treatment. View your full visit summary for details by clicking on the link below. Your pharmacist will able to address any questions you may have about the medication.     If you're not feeling better within 5-7 days, please schedule an appointment.  You can schedule an appointment right here in [place], or call [phone]  If the visit is for the same symptoms as your e-visit, we'll refund the cost of your e-visit if seen within seven days.      Viral Upper Respiratory Illness (Adult)    You have a viral upper respiratory illness (URI), which is another term for the common cold. This illness is contagious during the first few days. It is spread through the air by coughing and sneezing. It may also be spread by direct contact (touching the sick person and then touching your own eyes, nose, or mouth). Frequent handwashing will decrease risk of spread. Most viral illnesses go away within 7 to 10 days with rest and simple home remedies. Sometimes the illness may last for several weeks. Antibiotics will not kill a virus, and they are generally not prescribed for this condition.  Home care    If symptoms are severe, rest at home for the first 2 to 3 days. When you resume activity, don't let yourself get too tired.    Don't smoke. If you need help stopping, talk with your healthcare provider.    Avoid being exposed to cigarette smoke (yours or others ).    You may use acetaminophen or ibuprofen to control pain and fever, unless another medicine was prescribed. If you have chronic liver or kidney disease, have ever had a stomach ulcer or gastrointestinal bleeding, or are taking blood-thinning medicines, talk with your healthcare provider before using these medicines. Aspirin should never be given to anyone under 18 years of age who is ill with a viral infection or fever. It may cause severe liver or brain  damage.    Your appetite may be poor, so a light diet is fine. Stay well hydrated by drinking 6 to 8 glasses of fluids per day (water, soft drinks, juices, tea, or soup). Extra fluids will help loosen secretions in the nose and lungs.    Over-the-counter cold medicines will not shorten the length of time you re sick, but they may be helpful for the following symptoms: cough, sore throat, and nasal and sinus congestion. If you take prescription medicines, ask your healthcare provider or pharmacist which over-the-counter medicines are safe to use. (Note: Don't use decongestants if you have high blood pressure.)  Follow-up care  Follow up with your healthcare provider, or as advised.  When to seek medical advice  Call your healthcare provider right away if any of these occur:    Cough with lots of colored sputum (mucus)    Severe headache; face, neck, or ear pain    Difficulty swallowing due to throat pain    Fever of 100.4 F (38 C) or higher, or as directed by your healthcare provider  Call 911  Call 911 if any of these occur:    Chest pain, shortness of breath, wheezing, or difficulty breathing    Coughing up blood    Very severe pain with swallowing, especially if it goes along with a muffled voice   Date Last Reviewed: 6/1/2018 2000-2019 [employer]. [address] [place] [address], [place] [address]. All rights reserved. This information is not intended as a substitute for professional medical care. Always follow your healthcare professional's instructions.

## 2020-08-25 DIAGNOSIS — R32 URINARY INCONTINENCE, UNSPECIFIED TYPE: ICD-10-CM

## 2020-08-25 RX ORDER — OXYBUTYNIN CHLORIDE 5 MG/1
TABLET, EXTENDED RELEASE ORAL
Qty: 90 TABLET | Refills: 3 | Status: SHIPPED | OUTPATIENT
Start: 2020-08-25 | End: 2021-11-03

## 2020-09-14 ENCOUNTER — MYC MEDICAL ADVICE (OUTPATIENT)
Dept: FAMILY MEDICINE | Facility: CLINIC | Age: 70
End: 2020-09-14

## 2020-09-15 NOTE — TELEPHONE ENCOUNTER
LVM to call TC's and we will get scheduled for Pre-op with Dr Raymundo Park placed on 9/25 at 9:30

## 2020-09-15 NOTE — TELEPHONE ENCOUNTER
TC - can you please schedule pt for a preop px per his request.  Don't see anything on listons schedule in next couple weeks  Samara Cortez RN

## 2020-09-25 ENCOUNTER — OFFICE VISIT (OUTPATIENT)
Dept: FAMILY MEDICINE | Facility: CLINIC | Age: 70
End: 2020-09-25
Payer: COMMERCIAL

## 2020-09-25 VITALS
SYSTOLIC BLOOD PRESSURE: 152 MMHG | HEART RATE: 63 BPM | WEIGHT: 206.5 LBS | BODY MASS INDEX: 30.59 KG/M2 | DIASTOLIC BLOOD PRESSURE: 78 MMHG | TEMPERATURE: 96.2 F | HEIGHT: 69 IN | OXYGEN SATURATION: 96 %

## 2020-09-25 DIAGNOSIS — Z01.818 PRE-OP EXAM: Primary | ICD-10-CM

## 2020-09-25 DIAGNOSIS — H02.403 PTOSIS OF BOTH EYELIDS: ICD-10-CM

## 2020-09-25 PROCEDURE — 99214 OFFICE O/P EST MOD 30 MIN: CPT | Performed by: INTERNAL MEDICINE

## 2020-09-25 ASSESSMENT — MIFFLIN-ST. JEOR: SCORE: 1687.06

## 2020-09-25 ASSESSMENT — PATIENT HEALTH QUESTIONNAIRE - PHQ9: SUM OF ALL RESPONSES TO PHQ QUESTIONS 1-9: 0

## 2020-09-25 NOTE — PROGRESS NOTES
[address] [address]  Phone: [phone]  Primary Provider: Romeo Fonseca  Pre-op Performing Provider: GRACIELA SIN    PREOPERATIVE EVALUATION:  Today's date: 9/25/2020    Dionte Mackey is a 70 year old male who presents for a preoperative evaluation.    Surgical Information:  Surgery Details 9/25/2020   Surgery/Procedure: Blepharoplasty   Surgery Location: [facility]   Surgeon: Dr. Ruddy Cabral   Surgery Date: TBD   Time of Surgery: TBD   Where patient plans to recover: At home with family     Fax number for surgical facility: [phone]  Type of Anesthesia Anticipated: to be determined    Subjective     HPI related to upcoming procedure: patient presents to internal medicine clinic for pre op cardiac evaluation for upcoming ophthalmology surgery with Blepharoplasty for treatment of bilateral ptosis symptoms. Patient denies any known allergies to anesthesia agents. patient does not take any daily aspirin or any other blood medications. patient denies any chest pain, headaches, fever or chills. Patient denies any known CAD, CVA or Type 2 Diabetes.       Preop Questions 9/25/2020   1. Have you ever had a heart attack or stroke? No   2. Have you ever had surgery on your heart or blood vessels, such as a stent placement, a coronary artery bypass, or surgery on an artery in your head, neck, heart, or legs? No   3. Do you have chest pain with activity? No   4. Do you have a history of  heart failure? No   5. Do you currently have a cold, bronchitis or symptoms of other infection? No   6. Do you have a cough, shortness of breath, or wheezing? No   7. Do you or anyone in your family have previous history of blood clots? No   8. Do you or does anyone in your family have a serious bleeding problem such as prolonged bleeding following surgeries or cuts? No   9. Have you ever had problems with anemia or been told to take iron pills? No   10.  Have you had any abnormal blood loss such as black, tarry or bloody stools? No   11. Have you ever had a blood transfusion? No   Are you willing to have a blood transfusion if it is medically needed before, during, or after your surgery? Yes   13. Have you or any of your relatives ever had problems with anesthesia? YES   14. Do you have sleep apnea, excessive snoring or daytime drowsiness? No   15. Do you have any artifical heart valves or other implanted medical devices like a pacemaker, defibrillator, or continuous glucose monitor? No   16. Do you have artificial joints? No   17. Are you allergic to latex? No     Patient does not have a Health Care Directive or Living Will: Advance Directive received and scanned. Click on Code in the patient header to view.      Review of Systems  Constitutional, neuro, ENT, endocrine, pulmonary, cardiac, gastrointestinal, genitourinary, musculoskeletal, integument and psychiatric systems are negative, except as otherwise noted.    Patient Active Problem List    Diagnosis Date Noted     Hyperlipidemia LDL goal <130 10/27/2017     Priority: Medium     Recurrent major depressive disorder, in full remission (H) 11/09/2016     Priority: Medium     Non morbid obesity due to excess calories 11/09/2016     Priority: Medium     Benign essential hypertension 10/18/2016     Priority: Medium     Seasonal allergic rhinitis 10/18/2016     Priority: Medium     Moderate persistent asthma without complication 10/18/2016     Priority: Medium     Vasculogenic erectile dysfunction 10/18/2016     Priority: Medium     Herpes zoster without complication 10/18/2016     Priority: Medium     BPH (benign prostatic hyperplasia) 04/03/2015     Priority: Medium     Hematuria 04/03/2015     Priority: Medium      Past Medical History:   Diagnosis Date     Arthritis      BPH (benign prostatic hyperplasia)      Depression      Depressive disorder 2005     Hypertension      Non morbid obesity due to excess  calories 11/9/2016     Recurrent major depressive disorder, in full remission (H) 11/9/2016     Uncomplicated asthma      Past Surgical History:   Procedure Laterality Date     BACK SURGERY      cervical fusion,neck fx repair     COLONOSCOPY       CYSTOSCOPY, TRANSURETHRAL RESECTION (TUR) PROSTATE, COMBINED N/A 4/3/2015    Procedure: COMBINED CYSTOSCOPY, TRANSURETHRAL RESECTION (TUR) PROSTATE;  Surgeon: Bakari Barrios MD;  Location: [facility] OR     ENT SURGERY      SINUS SURGERY X 3,SEPTOPLASTY,TONSILLECTOMY     EYE SURGERY  11/10/2017     FOOT SURGERY Right      GENITOURINARY SURGERY      TURP IN 2010     HEAD & NECK SURGERY  10/15/2017     TONSILLECTOMY       Current Outpatient Medications   Medication Sig Dispense Refill     albuterol (PROAIR HFA/PROVENTIL HFA/VENTOLIN HFA) 108 (90 Base) MCG/ACT inhaler USE 2 INHALATIONS EVERY 6 HOURS 8.5 g 11     atorvastatin (LIPITOR) 20 MG tablet TAKE 1 TABLET DAILY 90 tablet 1     benzonatate (TESSALON) 200 MG capsule Take 1 capsule (200 mg) by mouth 3 times daily as needed for cough 20 capsule 1     buPROPion (WELLBUTRIN XL) 150 MG 24 hr tablet TAKE 1 TABLET EVERY MORNING 90 tablet 3     Chlorpheniramine-DM (CORICIDIN HBP COUGH/COLD) 4-30 MG TABS Take 1 tablet by mouth every 6 hours as needed (cough, nasal congestion, runny nose) 30 tablet 3     hydrochlorothiazide (HYDRODIURIL) 25 MG tablet TAKE 1 TABLET DAILY 90 tablet 1     lisinopril (ZESTRIL) 30 MG tablet TAKE 1 TABLET DAILY 90 tablet 1     mometasone-formoterol (DULERA) 200-5 MCG/ACT inhaler USE 2 INHALATIONS TWICE A DAY (DUE FOR A FOLLOW UP IN CLINIC, CALL [phone] TO SCHEDULE) 39 g 3     oxybutynin ER (DITROPAN-XL) 5 MG 24 hr tablet TAKE 1 TABLET DAILY 90 tablet 3     sertraline (ZOLOFT) 100 MG tablet TAKE 1 TABLET TWICE A  tablet 3     Sildenafil Citrate (VIAGRA PO) Take 100 mg by mouth as needed       valACYclovir (VALTREX) 1000 mg tablet Take 2 tablets (2,000 mg) by mouth 2 times daily 4 tablet 11      "budesonide (PULMICORT) 0.5 MG/2ML nebulizer solution Take 0.5 mg by nebulization daily         Allergies   Allergen Reactions     Ciprofloxacin Shortness Of Breath, Itching and Difficulty breathing     Moxifloxacin Hives        Social History     Tobacco Use     Smoking status: Never Smoker     Smokeless tobacco: Never Used   Substance Use Topics     Alcohol use: Yes     Alcohol/week: 3.0 - 4.0 standard drinks     Comment: 3 drinks on weekend days     Family History   Problem Relation Age of Onset     Colon Cancer Father 70     Coronary Artery Disease No family hx of      Cerebrovascular Disease No family hx of      Diabetes No family hx of      History   Drug Use No            Objective   BP (!) 152/78 (BP Location: Left arm, Patient Position: Sitting, Cuff Size: Adult Regular)   Pulse 63   Temp 96.2  F (35.7  C) (Temporal)   Ht 1.753 m (5' 9\")   Wt 93.7 kg (206 lb 8 oz)   SpO2 96%   BMI 30.49 kg/m    Physical Exam    GENERAL APPEARANCE:  alert and no distress     EYES: EOMI,  PERRL, bilateral ptosis symptoms noted     HENT: ear canals and TM's normal and nose and mouth without ulcers or lesions     NECK: no adenopathy, no asymmetry, masses, or scars and thyroid normal to palpation     RESP: lungs clear to auscultation - no rales, rhonchi or wheezes     CV: regular rates and rhythm, normal S1 S2, no S3 or S4 and no murmur, click or rub     ABDOMEN:  soft, nontender, no HSM or masses and bowel sounds normal     MS: extremities normal- no gross deformities noted, no evidence of inflammation in joints, FROM in all extremities.     SKIN: no suspicious lesions or rashes     NEURO: Normal strength and tone, sensory exam grossly normal, mentation intact and speech normal     PSYCH: mentation appears normal. and affect normal/bright     LYMPHATICS: No cervical adenopathy    Recent Labs   Lab Test 07/11/19  1142   HGB 15.2         POTASSIUM 4.4   CR 0.93        PRE-OP Diagnostics:  No EKG required for low " risk surgery (cataract, skin procedure, breast biopsy, etc).       Assessment & Plan   The proposed surgical procedure is considered LOW risk.    REVISED CARDIAC RISK INDEX  The patient has the following serious cardiovascular risks for perioperative complications:  No serious cardiac risks = 0 points    INTERPRETATION: 0 points: Class I (very low risk - 0.4% complication rate)       (Z01.818) Pre-op exam  (primary encounter diagnosis)  (H02.403) Ptosis of both eyelids  Comment: pre op cardiac evaluation for upcoming ophthalmology surgery with Blepharoplasty for treatment of bilateral ptosis symptoms.  Plan: patient can proceed with Blepharoplasty surgery for treatment of bilateral ptosis symptoms.    The patient has the following additional risks and recommendations for perioperative complications:     - No identified additional risk factors other than previously addressed     MEDICATION INSTRUCTIONS:  Patient is to take all scheduled medications on the day of surgery    RECOMMENDATION:  APPROVAL GIVEN to proceed with proposed procedure, without further diagnostic evaluation.      Signed Electronically by: Margaret Resendiz MD    Copy of this evaluation report is provided to requesting physician.    Preop [facility] Preop Guidelines    Revised Cardiac Risk Index

## 2020-09-26 ASSESSMENT — ASTHMA QUESTIONNAIRES: ACT_TOTALSCORE: 23

## 2020-11-01 ASSESSMENT — ACTIVITIES OF DAILY LIVING (ADL): CURRENT_FUNCTION: NO ASSISTANCE NEEDED

## 2020-11-02 ENCOUNTER — OFFICE VISIT (OUTPATIENT)
Dept: FAMILY MEDICINE | Facility: CLINIC | Age: 70
End: 2020-11-02
Payer: COMMERCIAL

## 2020-11-02 VITALS
HEART RATE: 68 BPM | BODY MASS INDEX: 30.1 KG/M2 | HEIGHT: 69 IN | WEIGHT: 203.2 LBS | OXYGEN SATURATION: 97 % | SYSTOLIC BLOOD PRESSURE: 136 MMHG | DIASTOLIC BLOOD PRESSURE: 76 MMHG | TEMPERATURE: 96.6 F

## 2020-11-02 DIAGNOSIS — F33.42 RECURRENT MAJOR DEPRESSIVE DISORDER, IN FULL REMISSION (H): ICD-10-CM

## 2020-11-02 DIAGNOSIS — Z00.00 ROUTINE GENERAL MEDICAL EXAMINATION AT A HEALTH CARE FACILITY: Primary | ICD-10-CM

## 2020-11-02 DIAGNOSIS — N40.1 BENIGN PROSTATIC HYPERPLASIA WITH LOWER URINARY TRACT SYMPTOMS, SYMPTOM DETAILS UNSPECIFIED: ICD-10-CM

## 2020-11-02 DIAGNOSIS — N52.9 VASCULOGENIC ERECTILE DYSFUNCTION, UNSPECIFIED VASCULOGENIC ERECTILE DYSFUNCTION TYPE: ICD-10-CM

## 2020-11-02 DIAGNOSIS — Z12.11 ENCOUNTER FOR SCREENING COLONOSCOPY: ICD-10-CM

## 2020-11-02 DIAGNOSIS — I10 BENIGN ESSENTIAL HYPERTENSION: ICD-10-CM

## 2020-11-02 DIAGNOSIS — J45.40 MODERATE PERSISTENT ASTHMA WITHOUT COMPLICATION: ICD-10-CM

## 2020-11-02 DIAGNOSIS — Z12.5 SCREENING FOR PROSTATE CANCER: ICD-10-CM

## 2020-11-02 DIAGNOSIS — E78.5 HYPERLIPIDEMIA LDL GOAL <130: ICD-10-CM

## 2020-11-02 LAB
ALBUMIN SERPL-MCNC: 4 G/DL (ref 3.4–5)
ALP SERPL-CCNC: 74 U/L (ref 40–150)
ALT SERPL W P-5'-P-CCNC: 67 U/L (ref 0–70)
ANION GAP SERPL CALCULATED.3IONS-SCNC: 4 MMOL/L (ref 3–14)
AST SERPL W P-5'-P-CCNC: 42 U/L (ref 0–45)
BILIRUB SERPL-MCNC: 0.4 MG/DL (ref 0.2–1.3)
BUN SERPL-MCNC: 20 MG/DL (ref 7–30)
CALCIUM SERPL-MCNC: 9.5 MG/DL (ref 8.5–10.1)
CHLORIDE SERPL-SCNC: 105 MMOL/L (ref 94–109)
CHOLEST SERPL-MCNC: 156 MG/DL
CO2 SERPL-SCNC: 26 MMOL/L (ref 20–32)
CREAT SERPL-MCNC: 1 MG/DL (ref 0.66–1.25)
ERYTHROCYTE [DISTWIDTH] IN BLOOD BY AUTOMATED COUNT: 13 % (ref 10–15)
GFR SERPL CREATININE-BSD FRML MDRD: 76 ML/MIN/{1.73_M2}
GLUCOSE SERPL-MCNC: 119 MG/DL (ref 70–99)
HCT VFR BLD AUTO: 43.4 % (ref 40–53)
HDLC SERPL-MCNC: 46 MG/DL
HGB BLD-MCNC: 14.7 G/DL (ref 13.3–17.7)
LDLC SERPL CALC-MCNC: 49 MG/DL
MCH RBC QN AUTO: 31.1 PG (ref 26.5–33)
MCHC RBC AUTO-ENTMCNC: 33.9 G/DL (ref 31.5–36.5)
MCV RBC AUTO: 92 FL (ref 78–100)
NONHDLC SERPL-MCNC: 110 MG/DL
PLATELET # BLD AUTO: 211 10E9/L (ref 150–450)
POTASSIUM SERPL-SCNC: 4.5 MMOL/L (ref 3.4–5.3)
PROT SERPL-MCNC: 7.3 G/DL (ref 6.8–8.8)
PSA SERPL-ACNC: 2.84 UG/L (ref 0–4)
RBC # BLD AUTO: 4.72 10E12/L (ref 4.4–5.9)
SODIUM SERPL-SCNC: 135 MMOL/L (ref 133–144)
TRIGL SERPL-MCNC: 305 MG/DL
WBC # BLD AUTO: 8.6 10E9/L (ref 4–11)

## 2020-11-02 PROCEDURE — 85027 COMPLETE CBC AUTOMATED: CPT | Performed by: INTERNAL MEDICINE

## 2020-11-02 PROCEDURE — G0103 PSA SCREENING: HCPCS | Performed by: INTERNAL MEDICINE

## 2020-11-02 PROCEDURE — 36415 COLL VENOUS BLD VENIPUNCTURE: CPT | Performed by: INTERNAL MEDICINE

## 2020-11-02 PROCEDURE — 80053 COMPREHEN METABOLIC PANEL: CPT | Performed by: INTERNAL MEDICINE

## 2020-11-02 PROCEDURE — 99214 OFFICE O/P EST MOD 30 MIN: CPT | Mod: 25 | Performed by: INTERNAL MEDICINE

## 2020-11-02 PROCEDURE — 80061 LIPID PANEL: CPT | Performed by: INTERNAL MEDICINE

## 2020-11-02 PROCEDURE — 99397 PER PM REEVAL EST PAT 65+ YR: CPT | Performed by: INTERNAL MEDICINE

## 2020-11-02 RX ORDER — TAMSULOSIN HYDROCHLORIDE 0.4 MG/1
0.4 CAPSULE ORAL DAILY
Qty: 90 CAPSULE | Refills: 3 | Status: SHIPPED | OUTPATIENT
Start: 2020-11-02 | End: 2021-11-03

## 2020-11-02 RX ORDER — SILDENAFIL 100 MG/1
100 TABLET, FILM COATED ORAL PRN
Qty: 30 TABLET | Refills: 11 | Status: SHIPPED | OUTPATIENT
Start: 2020-11-02 | End: 2022-08-16

## 2020-11-02 ASSESSMENT — ACTIVITIES OF DAILY LIVING (ADL): CURRENT_FUNCTION: NO ASSISTANCE NEEDED

## 2020-11-02 ASSESSMENT — MIFFLIN-ST. JEOR: SCORE: 1679.21

## 2020-11-02 NOTE — PROGRESS NOTES
"SUBJECTIVE:   Dionte Mackey is a 70 year old male who presents for Preventive Visit.      Patient has been advised of split billing requirements and indicates understanding: Yes   Are you in the first 12 months of your Medicare coverage?  No    Healthy Habits:     In general, how would you rate your overall health?  Good    Frequency of exercise:  2-3 days/week    Duration of exercise:  30-45 minutes    Do you usually eat at least 4 servings of fruit and vegetables a day, include whole grains    & fiber and avoid regularly eating high fat or \"junk\" foods?  Yes    Taking medications regularly:  Yes    Medication side effects:  None    Ability to successfully perform activities of daily living:  No assistance needed    Home Safety:  No safety concerns identified    Hearing Impairment:  Difficulty following a conversation in a noisy restaurant or crowded room, need to ask people to speak up or repeat themselves, find that men's voices are easier to understand than woman's and difficulty understanding soft or whispered speech    In the past 6 months, have you been bothered by leaking of urine? Yes    In general, how would you rate your overall mental or emotional health?  Good      PHQ-2 Total Score: 0    Additional concerns today:  Yes    Severe nocturia 4-5 times per night; without dysuria or hematuria ; getting worse over months   Do you feel safe in your environment? Yes    Have you ever done Advance Care Planning? (For example, a Health Directive, POLST, or a discussion with a medical provider or your loved ones about your wishes): Yes, advance care planning is on file.      Fall risk  Fallen 2 or more times in the past year?: No  Any fall with injury in the past year?: No    Cognitive Screening   1) Repeat 3 items (Leader, Season, Table)    2) Clock draw: NORMAL  3) 3 item recall: Recalls 3 objects  Results: 3 items recalled: COGNITIVE IMPAIRMENT LESS LIKELY    Mini-CogTM Copyright KEON Nicolas. Licensed by the " author for use in [facility]; reprinted with permission ([email or web address]). All rights reserved.      Do you have sleep apnea, excessive snoring or daytime drowsiness?: no    Reviewed and updated as needed this visit by clinical staff  Tobacco  Allergies  Meds   Med Hx  Surg Hx  Fam Hx          Reviewed and updated as needed this visit by Provider  Tobacco     Med Hx  Surg Hx  Fam Hx         Social History     Tobacco Use     Smoking status: Never Smoker     Smokeless tobacco: Never Used   Substance Use Topics     Alcohol use: Yes     Alcohol/week: 3.0 - 4.0 standard drinks     Comment: 3 drinks on weekend days     If you drink alcohol do you typically have >3 drinks per day or >7 drinks per week? No    Alcohol Use 11/2/2020   Prescreen: >3 drinks/day or >7 drinks/week? -   Prescreen: >3 drinks/day or >7 drinks/week? No         Current providers sharing in care for this patient include:   Patient Care Team:  Romeo Fonseca MD as PCP - General (Internal Medicine)  Romeo Fonseca MD as Assigned PCP    The following health maintenance items are reviewed in Epic and correct as of today:  Health Maintenance   Topic Date Due     DEPRESSION ACTION PLAN  1950     ASTHMA ACTION PLAN  09/13/2018     COLORECTAL CANCER SCREENING  10/30/2020     ASTHMA CONTROL TEST  03/25/2021     PHQ-9  03/25/2021     FALL RISK ASSESSMENT  09/25/2021     MEDICARE ANNUAL WELLNESS VISIT  11/02/2021     DTAP/TDAP/TD IMMUNIZATION (3 - Td) 08/18/2023     LIPID  07/11/2024     ADVANCE CARE PLANNING  11/02/2025     HEPATITIS C SCREENING  Completed     INFLUENZA VACCINE  Completed     Pneumococcal Vaccine: 65+ Years  Completed     ZOSTER IMMUNIZATION  Completed     AORTIC ANEURYSM SCREENING (SYSTEM ASSIGNED)  Completed     Pneumococcal Vaccine: Pediatrics (0 to 5 Years) and At-Risk Patients (6 to 64 Years)  Aged Out     IPV IMMUNIZATION  Aged Out     MENINGITIS IMMUNIZATION  Aged Out     HEPATITIS B IMMUNIZATION  Aged Out      Patient Active Problem List   Diagnosis     BPH (benign prostatic hyperplasia)     Hematuria     Benign essential hypertension     Seasonal allergic rhinitis     Moderate persistent asthma without complication     Vasculogenic erectile dysfunction     Herpes zoster without complication     Recurrent major depressive disorder, in full remission (H)     Non morbid obesity due to excess calories     Hyperlipidemia LDL goal <130     Past Surgical History:   Procedure Laterality Date     BACK SURGERY      cervical fusion,neck fx repair     COLONOSCOPY       CYSTOSCOPY, TRANSURETHRAL RESECTION (TUR) PROSTATE, COMBINED N/A 4/3/2015    Procedure: COMBINED CYSTOSCOPY, TRANSURETHRAL RESECTION (TUR) PROSTATE;  Surgeon: Bakari Barrios MD;  Location: [facility] OR     ENT SURGERY      SINUS SURGERY X 3,SEPTOPLASTY,TONSILLECTOMY     EYE SURGERY  11/10/2017     FOOT SURGERY Right      GENITOURINARY SURGERY      TURP IN 2010     HEAD & NECK SURGERY  10/15/2017     TONSILLECTOMY         Social History     Tobacco Use     Smoking status: Never Smoker     Smokeless tobacco: Never Used   Substance Use Topics     Alcohol use: Yes     Alcohol/week: 3.0 - 4.0 standard drinks     Comment: 3 drinks on weekend days     Family History   Problem Relation Age of Onset     Colon Cancer Father 70     Coronary Artery Disease No family hx of      Cerebrovascular Disease No family hx of      Diabetes No family hx of          Current Outpatient Medications   Medication Sig Dispense Refill     albuterol (PROAIR HFA/PROVENTIL HFA/VENTOLIN HFA) 108 (90 Base) MCG/ACT inhaler USE 2 INHALATIONS EVERY 6 HOURS 8.5 g 11     atorvastatin (LIPITOR) 20 MG tablet TAKE 1 TABLET DAILY 90 tablet 1     benzonatate (TESSALON) 200 MG capsule Take 1 capsule (200 mg) by mouth 3 times daily as needed for cough 20 capsule 1     buPROPion (WELLBUTRIN XL) 150 MG 24 hr tablet TAKE 1 TABLET EVERY MORNING 90 tablet 3     Chlorpheniramine-DM (CORICIDIN HBP COUGH/COLD) 4-30 MG TABS  "Take 1 tablet by mouth every 6 hours as needed (cough, nasal congestion, runny nose) 30 tablet 3     hydrochlorothiazide (HYDRODIURIL) 25 MG tablet TAKE 1 TABLET DAILY 90 tablet 1     lisinopril (ZESTRIL) 30 MG tablet TAKE 1 TABLET DAILY 90 tablet 1     mometasone-formoterol (DULERA) 200-5 MCG/ACT inhaler USE 2 INHALATIONS TWICE A DAY (DUE FOR A FOLLOW UP IN CLINIC, CALL [phone] TO SCHEDULE) 39 g 3     oxybutynin ER (DITROPAN-XL) 5 MG 24 hr tablet TAKE 1 TABLET DAILY 90 tablet 3     sertraline (ZOLOFT) 100 MG tablet TAKE 1 TABLET TWICE A  tablet 3     sildenafil (VIAGRA) 100 MG tablet Take 1 tablet (100 mg) by mouth as needed 30 tablet 11     tamsulosin (FLOMAX) 0.4 MG capsule Take 1 capsule (0.4 mg) by mouth daily 90 capsule 3     valACYclovir (VALTREX) 1000 mg tablet Take 2 tablets (2,000 mg) by mouth 2 times daily 4 tablet 11     Allergies   Allergen Reactions     Ciprofloxacin Shortness Of Breath, Itching and Difficulty breathing     Moxifloxacin Hives         Review of Systems  Constitutional, HEENT, cardiovascular, pulmonary, gi and gu systems are negative, except as otherwise noted.    OBJECTIVE:   /76 (BP Location: Left arm, Patient Position: Sitting, Cuff Size: Adult Large)   Pulse 68   Temp 96.6  F (35.9  C) (Temporal)   Ht 1.764 m (5' 9.45\")   Wt 92.2 kg (203 lb 3.2 oz)   SpO2 97%   BMI 29.62 kg/m   Estimated body mass index is 29.62 kg/m  as calculated from the following:    Height as of this encounter: 1.764 m (5' 9.45\").    Weight as of this encounter: 92.2 kg (203 lb 3.2 oz).  Physical Exam  GENERAL: healthy, alert and no distress  EYES: Eyes grossly normal to inspection, PERRL and conjunctivae and sclerae normal  HENT: ear canals and TM's normal, nose and mouth without ulcers or lesions  NECK: no adenopathy, no asymmetry, masses, or scars and thyroid normal to palpation  RESP: lungs clear to auscultation - no rales, rhonchi or wheezes  CV: regular rate and rhythm, normal S1 " S2, no S3 or S4, no murmur, click or rub, no peripheral edema and peripheral pulses strong  ABDOMEN: soft, nontender, no hepatosplenomegaly, no masses and bowel sounds normal  RECTAL: normal sphincter tone, no rectal masses, prostate large size, smooth, nontender without nodules or masses  MS: no gross musculoskeletal defects noted, no edema  SKIN: no suspicious lesions or rashes  NEURO: Normal strength and tone, mentation intact and speech normal  PSYCH: mentation appears normal, affect normal/bright    Labs pending     ASSESSMENT / PLAN:   1. Routine general medical examination at a health care facility      2. Recurrent major depressive disorder, in full remission (H)  Stable     3. Benign essential hypertension  Second check OK    4. Hyperlipidemia LDL goal <130  On statin therapy ; recheck   - Comprehensive metabolic panel  - CBC with platelets  - Lipid panel reflex to direct LDL Fasting    5. Moderate persistent asthma without complication  Stable     6. Benign prostatic hyperplasia with lower urinary tract symptoms, symptom details unspecified  Severe nocturia  Try flomax  Side effects and risks discussed  He plans to see Dr. Barrios in December ; he may need another surgery   - tamsulosin (FLOMAX) 0.4 MG capsule; Take 1 capsule (0.4 mg) by mouth daily  Dispense: 90 capsule; Refill: 3    7. Screening for prostate cancer    - Prostate spec antigen screen    8. Encounter for screening colonoscopy    - GASTROENTEROLOGY ADULT REF PROCEDURE ONLY; Future    9. Vasculogenic erectile dysfunction, unspecified vasculogenic erectile dysfunction type    - sildenafil (VIAGRA) 100 MG tablet; Take 1 tablet (100 mg) by mouth as needed  Dispense: 30 tablet; Refill: 11    Patient has been advised of split billing requirements and indicates understanding: Yes  COUNSELING:  Reviewed preventive health counseling, as reflected in patient instructions  Special attention given to:       Consider AAA screening for ages 65-75 and smoking  "history       Regular exercise       Healthy diet/nutrition       Immunizations    Reminded to get shingrix second shot ; he did get both in his recollection              Hepatitis C screening; done       Consider lung cancer screening for ages 55-80 years and 30 pack-year smoking history ; never smoke       Colon cancer screening; reminded to schedule colonosopy       Prostate cancer screening;PSA    Estimated body mass index is 29.62 kg/m  as calculated from the following:    Height as of this encounter: 1.764 m (5' 9.45\").    Weight as of this encounter: 92.2 kg (203 lb 3.2 oz).    Weight management plan: Discussed healthy diet and exercise guidelines    He reports that he has never smoked. He has never used smokeless tobacco.      Appropriate preventive services were discussed with this patient, including applicable screening as appropriate for cardiovascular disease, diabetes, osteopenia/osteoporosis, and glaucoma.  As appropriate for age/gender, discussed screening for colorectal cancer, prostate cancer, breast cancer, and cervical cancer. Checklist reviewing preventive services available has been given to the patient.    Reviewed patients plan of care and provided an AVS. The Basic Care Plan (routine screening as documented in Health Maintenance) for Dionte meets the Care Plan requirement. This Care Plan has been established and reviewed with the Patient.    Counseling Resources:  ATP IV Guidelines  Pooled Cohorts Equation Calculator  Breast Cancer Risk Calculator  Breast Cancer: Medication to Reduce Risk  FRAX Risk Assessment  ICSI Preventive Guidelines  Dietary Guidelines for Americans, 2010  USDA's MyPlate  ASA Prophylaxis  Lung CA Screening    Romeo Fonseca MD  [facility]    Identified Health Risks:  "

## 2020-11-02 NOTE — RESULT ENCOUNTER NOTE
The following letter pertains to your most recent diagnostic tests:    -Liver and gallbladder tests are normal for you. (ALT,AST, Alk phos, bilirubin), kidney function is normal for you (Creatinine, GFR), Sodium is normal, Potassium is normal for you, Calcium is normal for you    -Glucose (blood sugar) remains moderately elevated but not in the diabetic range.  This could be classified as 'pre diabetes'.  Reducing diet carbohydrate consumption and increasing physical activity could prevent this from getting worse.      -Your cholesterol panel looks healthy with the exception of persistently elevated triglycerides.  Reducing carbohydrates and fats in your diet, losing weight and consuming less alcohol can improve your triglyceride levels.     -Your prostate specific antigen (PSA) test result returned normal.     -Your complete blood counts including your hemoglobin returned normal for you.       Bottom line:  Work on your diet to improved blood sugar and triglycerides.  We can recheck in one year.        Follow up:  Schedule an appointment for a physical examination with fasting blood tests in one year's time, or return sooner if new questions, symptoms or problems arise.       Sincerely,    Dr. Fonseca

## 2020-11-03 ASSESSMENT — ASTHMA QUESTIONNAIRES: ACT_TOTALSCORE: 25

## 2020-11-07 DIAGNOSIS — I10 BENIGN ESSENTIAL HYPERTENSION: ICD-10-CM

## 2020-11-07 DIAGNOSIS — E78.5 HYPERLIPIDEMIA LDL GOAL <130: ICD-10-CM

## 2020-11-09 RX ORDER — HYDROCHLOROTHIAZIDE 25 MG/1
TABLET ORAL
Qty: 90 TABLET | Refills: 3 | Status: SHIPPED | OUTPATIENT
Start: 2020-11-09 | End: 2022-02-01

## 2020-11-09 RX ORDER — ATORVASTATIN CALCIUM 20 MG/1
TABLET, FILM COATED ORAL
Qty: 90 TABLET | Refills: 3 | Status: SHIPPED | OUTPATIENT
Start: 2020-11-09 | End: 2022-01-13

## 2020-11-09 RX ORDER — LISINOPRIL 30 MG/1
TABLET ORAL
Qty: 90 TABLET | Refills: 3 | Status: SHIPPED | OUTPATIENT
Start: 2020-11-09 | End: 2022-02-01

## 2020-12-09 ENCOUNTER — TRANSFERRED RECORDS (OUTPATIENT)
Dept: HEALTH INFORMATION MANAGEMENT | Facility: CLINIC | Age: 70
End: 2020-12-09

## 2020-12-24 ENCOUNTER — TRANSFERRED RECORDS (OUTPATIENT)
Dept: HEALTH INFORMATION MANAGEMENT | Facility: CLINIC | Age: 70
End: 2020-12-24

## 2020-12-29 ENCOUNTER — TRANSFERRED RECORDS (OUTPATIENT)
Dept: HEALTH INFORMATION MANAGEMENT | Facility: CLINIC | Age: 70
End: 2020-12-29

## 2021-02-24 ENCOUNTER — E-VISIT (OUTPATIENT)
Dept: URGENT CARE | Facility: URGENT CARE | Age: 71
End: 2021-02-24
Payer: COMMERCIAL

## 2021-02-24 DIAGNOSIS — Z20.822 CLOSE EXPOSURE TO 2019 NOVEL CORONAVIRUS: Primary | ICD-10-CM

## 2021-02-24 PROCEDURE — 99421 OL DIG E/M SVC 5-10 MIN: CPT | Performed by: PREVENTIVE MEDICINE

## 2021-02-24 NOTE — PATIENT INSTRUCTIONS
"  Dear Dionte Mackey,    Based on your exposure to COVID-19 (coronavirus), we would like to test you for this virus. I have placed an order for this test.The best time for testing is 5-7 days after the exposure.    How to schedule:  Go to your [employer] home page and scroll down to the section that says  You have an appointment that needs to be scheduled  and click the large green button that says  Schedule Now  and follow the steps to find the next available opening.     If you are unable to complete these [employer] scheduling steps, please call [phone] to schedule your testing.     Return to work/school/ guidance:   For people with high risk exposures outside the home    Please let your workplace manager and staffing office know when your quarantine ends.     We can not give you an exact date as it depends on the information below. You can calculate this on your own or work with your manager/staffing office to calculate this. (For example if you were exposed on 10/4, you would have to quarantine for 14 full days. That would be through 10/18. You could return on 10/19.)    Quarantine Guidelines:  Patients (\"contacts\") who have been in close prolonged contact of an infected person(s) (within six feet for at least 15 minutes within a 24 hour period), and remain asymptomatic should enter quarantine based on the following options:    14-day quarantine period (this remains the CDC recommendation for the greatest protection against spread of COVID-19) OR    Minimum 7-day quarantine with negative RT-PCR test collected on day 5 or later OR    10-day quarantine with no test  Quarantine Guideline exceptions are as follows:  ? People whose high-risk exposure was a household member should always quarantine for 14 days from their last date of exposure  ? Residents of congregate care and congregate living settings should always quarantine for 14 days from their last date of exposure  ? Individuals who work in health " care, congregate care, or congregate living should be off work for 14 days from their last date of exposure. Community activities for this group can be resumed based on options above.  ? For people with high risk exposure to an individual they live with it is still recommended to quarantine for 14 days the last date of exposure even if the covid test is negative.   Note: If you have ongoing exposure to the covid positive person, this quarantine period may be more than 14 days. (For example, if you are continued to be exposed to your child who tested positive and cannot isolate from them, then the quarantine of 7-14 days can't start until your child is no longer contagious. This is typically 10 days from onset of the child's symptoms. So the total duration may be 17-24 days in this case.)    You should continue symptom monitoring until day 14 post-exposure. If you develop signs or symptoms of COVID-19, isolate and get tested (even if you have been tested already).    How to quarantine:   Stay home and away from others. Don't go to school or anywhere else. Generally quarantine means staying home from work but there are some exceptions to this. Please contact your workplace.  No hugging, kissing or shaking hands.  Don't let anyone visit.  Cover your mouth and nose with a mask, tissue or washcloth to avoid spreading germs.  Wash your hands and face often. Use soap and water.    What are the symptoms of COVID-19?  The most common symptoms are cough, fever and trouble breathing. Less common symptoms include headache, body aches, fatigue (feeling very tired), chills, sore throat, stuffy or runny nose, diarrhea (loose poop), loss of taste or smell, belly pain, and nausea or vomiting (feeling sick to your stomach or throwing up).  After 14 days, if you have still don't have symptoms, you likely don't have this virus.  If you develop symptoms, follow these guidelines.  If you're normally healthy: Please start another  eVisit.  If you have a serious health problem (like cancer, heart failure, an organ transplant or kidney disease): Call your specialty clinic. Let them know that you might have COVID-19.    Where can I get more information?   [email or web address] [place] - About COVID-19: [email or web address]  CDC - What to Do If You're Sick: www.cdc.gov/coronavirus/2019-ncov/about/steps-when-sick.html  CDC - Ending Home Isolation: www.cdc.gov/coronavirus/2019-ncov/hcp/disposition-in-home-patients.html  CDC - Caring for Someone: www.cdc.gov/coronavirus/2019-ncov/if-you-are-sick/care-for-someone.html  [facility] clinical trials (COVID-19 research studies): clinicalaffairs.[employer].[facility]/[employer]-clinical-trials  Below are the COVID-19 hotlines at the [facility] ([facility]). Interpreters are available.  For health questions: Call 356-401-2434 or 1-864.250.6156 (7 a.m. to 7 p.m.)  For questions about schools and childcare: Call 509-874-6757 or 1-919.715.7229 (7 a.m. to 7 p.m.)

## 2021-03-17 ENCOUNTER — TRANSFERRED RECORDS (OUTPATIENT)
Dept: HEALTH INFORMATION MANAGEMENT | Facility: CLINIC | Age: 71
End: 2021-03-17

## 2021-04-27 ENCOUNTER — E-VISIT (OUTPATIENT)
Dept: FAMILY MEDICINE | Facility: CLINIC | Age: 71
End: 2021-04-27
Payer: COMMERCIAL

## 2021-04-27 DIAGNOSIS — B96.89 ACUTE BACTERIAL SINUSITIS: Primary | ICD-10-CM

## 2021-04-27 DIAGNOSIS — J01.90 ACUTE BACTERIAL SINUSITIS: Primary | ICD-10-CM

## 2021-04-27 PROCEDURE — 99421 OL DIG E/M SVC 5-10 MIN: CPT | Performed by: INTERNAL MEDICINE

## 2021-04-27 NOTE — PATIENT INSTRUCTIONS
Dear Dionte Mackey    After reviewing your responses, I've been able to diagnose you with?a sinus infection caused by bacteria.?     Based on your responses and diagnosis, I have prescribed Augmentin to treat your symptoms. I have sent this to your pharmacy.?     It is also important to stay well hydrated, get lots of rest and take over-the-counter decongestants,?tylenol?or ibuprofen if you?are able to?take those medications per your primary care provider to help relieve discomfort.?     It is important that you take?all of?your prescribed medication even if your symptoms are improving after a few doses.? Taking?all of?your medicine helps prevent the symptoms from returning.?     If your symptoms worsen, you develop severe headache, vomiting, high fever (>102), or are not improving in 7 days, please contact your primary care provider for an appointment or visit any of our convenient Walk-in Care or Urgent Care Centers to be seen which can be found on our website?here.?     Thanks again for choosing?us?as your health care partner,?   ?  Romeo Fonseca MD?     Sinusitis (Antibiotic Treatment)    The sinuses are air-filled spaces within the bones of the face. They connect to the inside of the nose. Sinusitis is an inflammation of the tissue that lines the sinuses. Sinusitis can occur during a cold. It can also happen due to allergies to pollens and other particles in the air. Sinusitis can cause symptoms of sinus congestion and a feeling of fullness. A sinus infection causes fever, headache, and facial pain. There is often green or yellow fluid draining from the nose or into the back of the throat (post-nasal drip). You have been given antibiotics to treat this condition.   Home care    Take the full course of antibiotics as instructed. Don't stop taking them, even when you feel better.    Drink plenty of water, hot tea, and other liquids as directed by the healthcare provider. This may help thin nasal mucus. It  also may help your sinuses drain fluids.    Heat may help soothe painful areas of your face. Use a towel soaked in hot water. Or,  the shower and direct the warm spray onto your face. Using a vaporizer along with a menthol rub at night may also help soothe symptoms.     An expectorant with guaifenesin may help thin nasal mucus and help your sinuses drain fluids. Talk with your provider or pharmacists before taking an over-the-counter (OTC) medicine if you have any questions about it or its side effects..    You can use an OTC decongestant, unless a similar medicine was prescribed to you. Nasal sprays work the fastest. Use one that contains phenylephrine or oxymetazoline. First blow your nose gently. Then use the spray. Don't use these medicines more often than directed on the label. If you do, your symptoms may get worse. You may also take pills that contain pseudoephedrine. Don t use products that combine multiple medicines. This is because side effects may be increased. Read labels. You can also ask the pharmacist for help. (People with high blood pressure should not use decongestants. They can raise blood pressure.) Talk with your provider or pharmacist if you have any questions about the medicine..    OTC antihistamines may help if allergies contributed to your sinusitis. Talk with your provider or pharmacist if you have any questions about the medicine..    Don't use nasal rinses or irrigation during an acute sinus infection, unless your healthcare provider tells you to. Rinsing may spread the infection to other areas in your sinuses.    Use acetaminophen or ibuprofen to control pain, unless another pain medicine was prescribed to you. If you have chronic liver or kidney disease or ever had a stomach ulcer, talk with your healthcare provider before using these medicines. Never give aspirin to anyone under age 18 who is ill with a fever. It may cause severe liver damage.    Don't smoke. This can make  symptoms worse.    Follow-up care  Follow up with your healthcare provider, or as advised.   When to seek medical advice  Call your healthcare provider if any of these occur:     Facial pain or headache that gets worse    Stiff neck    Unusual drowsiness or confusion    Swelling of your forehead or eyelids    Symptoms don't go away in 10 days    Vision problems, such as blurred or double vision    Fever of 100.4 F (38 C) or higher, or as directed by your healthcare provider  Call 911  Call 911 if any of these occur:     Seizure    Trouble breathing    Feeling dizzy or faint    Fingernails, skin or lips look blue, purple , or gray  Prevention  Here are steps you can take to help prevent an infection:     Keep good hand washing habits.    Don t have close contact with people who have sore throats, colds, or other upper respiratory infections.    Don t smoke, and stay away from secondhand smoke.    Stay up to date with of your vaccines.  [employer] last reviewed this educational content on 12/1/2019 2000-2021 [employer]. All rights reserved. This information is not intended as a substitute for professional medical care. Always follow your healthcare professional's instructions.

## 2021-08-06 DIAGNOSIS — F33.42 RECURRENT MAJOR DEPRESSIVE DISORDER, IN FULL REMISSION (H): ICD-10-CM

## 2021-08-06 DIAGNOSIS — J45.40 MODERATE PERSISTENT ASTHMA WITHOUT COMPLICATION: ICD-10-CM

## 2021-08-06 RX ORDER — BUPROPION HYDROCHLORIDE 150 MG/1
TABLET ORAL
Qty: 90 TABLET | Refills: 3 | Status: SHIPPED | OUTPATIENT
Start: 2021-08-06 | End: 2022-08-03

## 2021-08-06 RX ORDER — SERTRALINE HYDROCHLORIDE 100 MG/1
TABLET, FILM COATED ORAL
Qty: 180 TABLET | Refills: 3 | Status: SHIPPED | OUTPATIENT
Start: 2021-08-06 | End: 2022-08-03

## 2021-08-06 RX ORDER — MOMETASONE FUROATE AND FORMOTEROL FUMARATE DIHYDRATE 200; 5 UG/1; UG/1
AEROSOL RESPIRATORY (INHALATION)
Qty: 39 G | Refills: 3 | Status: SHIPPED | OUTPATIENT
Start: 2021-08-06 | End: 2022-07-25

## 2021-08-06 NOTE — TELEPHONE ENCOUNTER
Routing refill request to provider for review/approval because:  ACT Total Scores 7/11/2019 9/25/2020 11/2/2020   ACT TOTAL SCORE (Goal Greater than or Equal to 20) 21 23 25   In the past 12 months, how many times did you visit the emergency room for your asthma without being admitted to the hospital? 0 0 0   In the past 12 months, how many times were you hospitalized overnight because of your asthma? 0 0 0     PHQ 7/11/2019 7/11/2019 9/25/2020   PHQ-9 Total Score 0 0 0   Q9: Thoughts of better off dead/self-harm past 2 weeks Not at all Not at all Not at all         Yoana Mares, RN  [facility]

## 2021-09-05 ENCOUNTER — HEALTH MAINTENANCE LETTER (OUTPATIENT)
Age: 71
End: 2021-09-05

## 2021-10-27 ENCOUNTER — APPOINTMENT (OUTPATIENT)
Dept: URGENT CARE | Facility: CLINIC | Age: 71
End: 2021-10-27
Payer: COMMERCIAL

## 2021-11-02 DIAGNOSIS — N40.1 BENIGN PROSTATIC HYPERPLASIA WITH LOWER URINARY TRACT SYMPTOMS, SYMPTOM DETAILS UNSPECIFIED: ICD-10-CM

## 2021-11-03 RX ORDER — TAMSULOSIN HYDROCHLORIDE 0.4 MG/1
CAPSULE ORAL
Qty: 90 CAPSULE | Refills: 1 | Status: SHIPPED | OUTPATIENT
Start: 2021-11-03 | End: 2024-09-11

## 2021-11-03 NOTE — TELEPHONE ENCOUNTER
Routing refill request to provider for review/approval because:  Drug not on the [facility] refill protocol   BP Readings from Last 3 Encounters:   11/02/20 136/76   09/25/20 (!) 152/78   10/16/19 120/70        Amy Nguyen RN  [facility] Triage

## 2021-12-26 ENCOUNTER — HEALTH MAINTENANCE LETTER (OUTPATIENT)
Age: 71
End: 2021-12-26

## 2022-01-30 DIAGNOSIS — I10 BENIGN ESSENTIAL HYPERTENSION: ICD-10-CM

## 2022-02-01 RX ORDER — HYDROCHLOROTHIAZIDE 25 MG/1
TABLET ORAL
Qty: 90 TABLET | Refills: 3 | Status: SHIPPED | OUTPATIENT
Start: 2022-02-01 | End: 2023-01-26

## 2022-02-01 RX ORDER — LISINOPRIL 30 MG/1
TABLET ORAL
Qty: 90 TABLET | Refills: 3 | Status: SHIPPED | OUTPATIENT
Start: 2022-02-01 | End: 2023-01-26

## 2022-02-01 NOTE — TELEPHONE ENCOUNTER
Routing refill request to provider for review/approval because:  Labs not current:    BP Readings from Last 6 Encounters:   11/02/20 136/76   09/25/20 (!) 152/78   10/16/19 120/70   07/11/19 118/71   01/14/19 120/66   06/01/18 116/69      Creatinine   Date Value Ref Range Status   11/02/2020 1.00 0.66 - 1.25 mg/dL Final   07/11/2019 0.93 0.66 - 1.25 mg/dL Final   06/01/2018 1.12 0.66 - 1.25 mg/dL Final   09/13/2017 0.91 0.66 - 1.25 mg/dL Final   01/25/2017 1.00 0.5 - 1.30 mg/dL Final   10/18/2016 1.00 0.66 - 1.25 mg/dL Final      Joelle Ortiz RN

## 2022-04-12 DIAGNOSIS — E78.5 HYPERLIPIDEMIA LDL GOAL <130: ICD-10-CM

## 2022-04-15 RX ORDER — ATORVASTATIN CALCIUM 20 MG/1
TABLET, FILM COATED ORAL
Qty: 90 TABLET | Refills: 0 | Status: SHIPPED | OUTPATIENT
Start: 2022-04-15 | End: 2022-07-14

## 2022-04-15 NOTE — TELEPHONE ENCOUNTER
Routing refill request to provider for review/approval because:  LDL Cholesterol Calculated   Date Value Ref Range Status   11/02/2020 49 <100 mg/dL Final     Comment:     Desirable:       <100 mg/dl     Pt in [place] till May per last zohra Dias RN

## 2022-05-31 ENCOUNTER — APPOINTMENT (OUTPATIENT)
Dept: URGENT CARE | Facility: CLINIC | Age: 72
End: 2022-05-31
Payer: COMMERCIAL

## 2022-05-31 ENCOUNTER — MYC MEDICAL ADVICE (OUTPATIENT)
Dept: FAMILY MEDICINE | Facility: CLINIC | Age: 72
End: 2022-05-31
Payer: COMMERCIAL

## 2022-06-02 ENCOUNTER — NURSE TRIAGE (OUTPATIENT)
Dept: FAMILY MEDICINE | Facility: CLINIC | Age: 72
End: 2022-06-02
Payer: COMMERCIAL

## 2022-06-02 NOTE — TELEPHONE ENCOUNTER
See triage note pt going to ER     Ligia HYLTON, Triage RN  [facility]

## 2022-06-02 NOTE — TELEPHONE ENCOUNTER
"Pt called c/o hernia with pain     Per protocol, advised ER. Pt agreed to go to ER     1. ONSET: \"When did this first appear?\" 3-4 days ago   2. APPEARANCE: \"What does it look like?\" bump on right groin side, hard   3. SIZE: \"How big is it?\" (inches, cm or compare to coins, fruit) small lime sized   4. LOCATION: \"Where exactly is the hernia located?\" right groin   5. PATTERN: \"Does the swelling come and go, or has it been constant since it started?\" constant   6. PAIN: \"Is there any pain?\" If so, ask: \"How bad is it?\" (Scale 1-10; or mild, moderate, severe) pain is present increased a little bit - mild to moderate    7. DIAGNOSIS: \"Have you been seen by a doctor for this?\" \"Did the doctor diagnose you as having a hernia?\" never before this is new   8. OTHER SYMPTOMS: \"Do you have any other symptoms?\" (e.g., fever, abdominal pain, vomiting) no N/V fever, no abdominal pain    Reason for Disposition    Hernia is painful or tender to touch    Additional Information    Negative: [1] Swelling of scrotum AND [2] has not previously been diagnosed with a hernia    Negative: SEVERE abdominal pain    Protocols used: HERNIA-YRN-ISAURA HYLTON, Triage RN  [facility]     "

## 2022-06-17 ENCOUNTER — TELEPHONE (OUTPATIENT)
Dept: FAMILY MEDICINE | Facility: CLINIC | Age: 72
End: 2022-06-17

## 2022-06-17 NOTE — TELEPHONE ENCOUNTER
Pt called requesting appoinment for a preop. He is having  hernia surgery scheduled  07/22. Also request COVID-19 test as requirement for procedure. Future appts are blocked for VV next day provider approved or same day. Triage huddled with NP Luann Pro. This provider approved use of any same day visit except on a Monday. Triage tried calling back. Left voice message advising him to call triage back. On call back, please help pt schedule an appt.

## 2022-07-12 DIAGNOSIS — E78.5 HYPERLIPIDEMIA LDL GOAL <130: ICD-10-CM

## 2022-07-14 RX ORDER — ATORVASTATIN CALCIUM 20 MG/1
TABLET, FILM COATED ORAL
Qty: 90 TABLET | Refills: 1 | Status: SHIPPED | OUTPATIENT
Start: 2022-07-14 | End: 2023-01-11

## 2022-07-14 NOTE — TELEPHONE ENCOUNTER
Routing refill request to provider for review/approval because:  Last LDL in 2020, has upcoming visit Nov at 7am. Pended to get through   Samara Cortez RN

## 2022-07-18 ENCOUNTER — TELEPHONE (OUTPATIENT)
Dept: FAMILY MEDICINE | Facility: CLINIC | Age: 72
End: 2022-07-18

## 2022-07-18 NOTE — TELEPHONE ENCOUNTER
Reason for Call:  Other appointment    Detailed comments: Spouse: Pt needs help with serious memory loss issues, specialist appt in January with [facility] but help is needed sooner. Spouse is at a loss, doesn't know how to help Pt.    Phone Number Patient can be reached at: Other phone number:  Spouse Jessie Ceballos [phone]     Best Time: ASAP    Can we leave a detailed message on this number? YES    Call taken on 7/18/2022 at 5:13 PM by Laura Kanavel

## 2022-07-21 DIAGNOSIS — J45.40 MODERATE PERSISTENT ASTHMA WITHOUT COMPLICATION: ICD-10-CM

## 2022-07-23 ASSESSMENT — PATIENT HEALTH QUESTIONNAIRE - PHQ9
SUM OF ALL RESPONSES TO PHQ QUESTIONS 1-9: 3
SUM OF ALL RESPONSES TO PHQ QUESTIONS 1-9: 3
10. IF YOU CHECKED OFF ANY PROBLEMS, HOW DIFFICULT HAVE THESE PROBLEMS MADE IT FOR YOU TO DO YOUR WORK, TAKE CARE OF THINGS AT HOME, OR GET ALONG WITH OTHER PEOPLE: SOMEWHAT DIFFICULT

## 2022-07-25 ENCOUNTER — VIRTUAL VISIT (OUTPATIENT)
Dept: FAMILY MEDICINE | Facility: CLINIC | Age: 72
End: 2022-07-25
Payer: COMMERCIAL

## 2022-07-25 ENCOUNTER — TELEPHONE (OUTPATIENT)
Dept: FAMILY MEDICINE | Facility: CLINIC | Age: 72
End: 2022-07-25

## 2022-07-25 DIAGNOSIS — F33.42 RECURRENT MAJOR DEPRESSIVE DISORDER, IN FULL REMISSION (H): ICD-10-CM

## 2022-07-25 DIAGNOSIS — R41.3 MEMORY LOSS: Primary | ICD-10-CM

## 2022-07-25 PROCEDURE — 99214 OFFICE O/P EST MOD 30 MIN: CPT | Mod: 95 | Performed by: INTERNAL MEDICINE

## 2022-07-25 RX ORDER — MOMETASONE FUROATE AND FORMOTEROL FUMARATE DIHYDRATE 200; 5 UG/1; UG/1
AEROSOL RESPIRATORY (INHALATION)
Qty: 39 G | Refills: 3 | Status: SHIPPED | OUTPATIENT
Start: 2022-07-25 | End: 2023-07-17

## 2022-07-25 ASSESSMENT — PATIENT HEALTH QUESTIONNAIRE - PHQ9
10. IF YOU CHECKED OFF ANY PROBLEMS, HOW DIFFICULT HAVE THESE PROBLEMS MADE IT FOR YOU TO DO YOUR WORK, TAKE CARE OF THINGS AT HOME, OR GET ALONG WITH OTHER PEOPLE: SOMEWHAT DIFFICULT
SUM OF ALL RESPONSES TO PHQ QUESTIONS 1-9: 3

## 2022-07-25 NOTE — TELEPHONE ENCOUNTER
No ans to sched non fasting lab appt this week per Dr. Fonseca's check out note today.    Pamella WHARTON MA

## 2022-07-25 NOTE — PROGRESS NOTES
Andres is a 71 year old who is being evaluated via a billable video visit.      How would you like to obtain your AVS? Mickey  If the video visit is dropped, the invitation should be resent by: Text to cell phone: MICKEY  Will anyone else be joining your video visit? Yes: WIFE . How would they like to receive their invitation? Text to cell phone: MICKEY        Assessment & Plan     Memory loss  Check labs for reversible causes  Check brain MRI   If all negative, then OT cognitive evaluation to assess severity  Consideration of aricept or stopping oxybutynin based on results  - Vitamin B12; Future  - Folate; Future  - TSH; Future  - Sodium; Future  - MR Brain w/o & w Contrast; Future    Recurrent major depressive disorder, in full remission (H)  Stable on sertraline         32 minutes spent on the date of the encounter doing chart review, history and exam, documentation and further activities per the note       Return in about 1 week (around 8/1/2022) for lab appt this week non-fasting.  Patient instructed to return to clinic or contact us sooner if symptoms worsen or new symptoms develop.     Romeo Fonseca MD  [facility] DEA Campos is a 71 year old accompanied by his spouse, presenting for the following health issues:  Memory Loss      History of Present Illness       Reason for visit:  I am very concerned about my memory loss and would like an assessment  Symptom onset:  More than a month  Symptoms include:  Loss of words & thoughts in middle of conversations. Confusion mixing up words or starting story about my grandson sailing & mid story finish with how he scored in soccer game. Mixing up words, mood changes angry. Others mention seeing changes to my wife  Symptom intensity:  Mild  Symptom progression:  Worsening  Had these symptoms before:  No  What makes it worse:  I get frustrated and feel bad and mad when I notice my confusion  What makes it better:  Not really    He eats 4  or more servings of fruits and vegetables daily.He consumes 1 sweetened beverage(s) daily.He exercises with enough effort to increase his heart rate 20 to 29 minutes per day.  He exercises with enough effort to increase his heart rate 4 days per week.   He is taking medications regularly.    Today's PHQ-9         PHQ-9 Total Score: 3    PHQ-9 Q9 Thoughts of better off dead/self-harm past 2 weeks :   Not at all    How difficult have these problems made it for you to do your work, take care of things at home, or get along with other people: Somewhat difficult         Perfect sore on MiniCog test  Recalled 3 objects, clock was OK        Review of Systems         Objective           Vitals:  No vitals were obtained today due to virtual visit.    Physical Exam   GENERAL: Healthy, alert and no distress  EYES: Eyes grossly normal to inspection.  No discharge or erythema, or obvious scleral/conjunctival abnormalities.  RESP: No audible wheeze, cough, or visible cyanosis.  No visible retractions or increased work of breathing.    SKIN: Visible skin clear. No significant rash, abnormal pigmentation or lesions.  NEURO: Cranial nerves grossly intact.  Mentation and speech appropriate for age.  No obvious memory problems on video exam.    PSYCH: Mentation appears normal, affect normal/bright, judgement and insight intact, normal speech and appearance well-groomed.                Video-Visit Details    Video Start Time: 1612    Type of service:  Video Visit    Video End Time:4:34 PM    Originating Location (pt. Location): Home    Distant Location (provider location):  [facility]     Platform used for Video Visit: [employer]    .  ..     Please call [facility] at [phone] to schedule your MRI.

## 2022-07-25 NOTE — TELEPHONE ENCOUNTER
Routing refill request to provider for review/approval because:  Last ACT 11/2/20220 = 25    Last office vist: 11/2/2020    Pended.  Please Review.    Next office visit: 7/25/2022      Salud Guerrero RN  [facility]

## 2022-07-27 ENCOUNTER — LAB (OUTPATIENT)
Dept: LAB | Facility: CLINIC | Age: 72
End: 2022-07-27
Payer: COMMERCIAL

## 2022-07-27 DIAGNOSIS — R41.3 MEMORY LOSS: ICD-10-CM

## 2022-07-27 LAB
FOLATE SERPL-MCNC: >20 NG/ML (ref 4.6–34.8)
VIT B12 SERPL-MCNC: 335 PG/ML (ref 232–1245)

## 2022-07-27 PROCEDURE — 84295 ASSAY OF SERUM SODIUM: CPT

## 2022-07-27 PROCEDURE — 82607 VITAMIN B-12: CPT

## 2022-07-27 PROCEDURE — 82746 ASSAY OF FOLIC ACID SERUM: CPT

## 2022-07-27 PROCEDURE — 84443 ASSAY THYROID STIM HORMONE: CPT

## 2022-07-27 PROCEDURE — 36415 COLL VENOUS BLD VENIPUNCTURE: CPT

## 2022-07-27 NOTE — LETTER
July 29, 2022      Andres Mackey  [id number] [address] [place] [id number]        Dear ,    The following letter pertains to your most recent diagnostic tests:     -TSH (thyroid stimulating hormone) level is normal which indicates normal circulating thyroid hormone levels for a nicki in your age group.     -The blood sodium level is normal.     -Folate levels are normal.     -Your vitamin B12 level is normal, but it is at the lower limit of normal.  Taking an over the counter B-complex vitamin as directed would help optimize cognitive function, but I doubt lack of B12 is the cause for your symptoms.         BOTTOM LINE:  The labs do not show an obvious cause for memory problems.  The B12 is low normal, so take a B complex vitamin available over the counter.  Schedule your brain MRI.          Resulted Orders   Vitamin B12   Result Value Ref Range    Vitamin B12 335 232 - 1,245 pg/mL   Folate   Result Value Ref Range    Folic Acid >20.0 4.6 - 34.8 ng/mL   TSH   Result Value Ref Range    TSH 5.04 (H) 0.40 - 4.00 mU/L   Sodium   Result Value Ref Range    Sodium 134 133 - 144 mmol/L       If you have any questions or concerns, please call the clinic at the number listed above.       Sincerely,      Romeo Fonseca MD        becca

## 2022-07-28 LAB
SODIUM SERPL-SCNC: 134 MMOL/L (ref 133–144)
TSH SERPL DL<=0.005 MIU/L-ACNC: 5.04 MU/L (ref 0.4–4)

## 2022-07-28 NOTE — RESULT ENCOUNTER NOTE
The following letter pertains to your most recent diagnostic tests:    -TSH (thyroid stimulating hormone) level is normal which indicates normal circulating thyroid hormone levels for a nicki in your age group.    -The blood sodium level is normal.    -Folate levels are normal.    -Your vitamin B12 level is normal, but it is at the lower limit of normal.  Taking an over the counter B-complex vitamin as directed would help optimize cognitive function, but I doubt lack of B12 is the cause for your symptoms.         BOTTOM LINE:  The labs do not show an obvious cause for memory problems.  The B12 is low normal, so take a B complex vitamin available over the counter.  Schedule your brain MRI.        Sincerely,    Dr. Fonseca

## 2022-08-01 DIAGNOSIS — F33.42 RECURRENT MAJOR DEPRESSIVE DISORDER, IN FULL REMISSION (H): ICD-10-CM

## 2022-08-03 ENCOUNTER — ANCILLARY PROCEDURE (OUTPATIENT)
Dept: MRI IMAGING | Facility: CLINIC | Age: 72
End: 2022-08-03
Attending: INTERNAL MEDICINE
Payer: COMMERCIAL

## 2022-08-03 DIAGNOSIS — R41.3 MEMORY LOSS: ICD-10-CM

## 2022-08-03 PROCEDURE — 255N000002 HC RX 255 OP 636: Performed by: INTERNAL MEDICINE

## 2022-08-03 PROCEDURE — 70553 MRI BRAIN STEM W/O & W/DYE: CPT

## 2022-08-03 PROCEDURE — A9585 GADOBUTROL INJECTION: HCPCS | Performed by: INTERNAL MEDICINE

## 2022-08-03 RX ORDER — BUPROPION HYDROCHLORIDE 150 MG/1
TABLET ORAL
Qty: 90 TABLET | Refills: 0 | Status: SHIPPED | OUTPATIENT
Start: 2022-08-03 | End: 2022-10-31

## 2022-08-03 RX ORDER — GADOBUTROL 604.72 MG/ML
10 INJECTION INTRAVENOUS ONCE
Status: COMPLETED | OUTPATIENT
Start: 2022-08-03 | End: 2022-08-03

## 2022-08-03 RX ORDER — SERTRALINE HYDROCHLORIDE 100 MG/1
TABLET, FILM COATED ORAL
Qty: 180 TABLET | Refills: 0 | Status: SHIPPED | OUTPATIENT
Start: 2022-08-03 | End: 2022-10-31

## 2022-08-03 RX ADMIN — GADOBUTROL 10 ML: 604.72 INJECTION INTRAVENOUS at 09:54

## 2022-08-03 NOTE — TELEPHONE ENCOUNTER
Routing refill request to provider for review/approval because:  Drug interaction warning    Gabi Mendieta, RN

## 2022-08-04 NOTE — RESULT ENCOUNTER NOTE
The following letter pertains to your most recent diagnostic tests:    There are no acutely dangerous findings on this brain scan.  However, the scan does show evidence for a stroke that may have happened months or even years ago.  I don't know whether this stroke is the cause of your memory problems.  A neurologist may be able to help us understand that better.  I think you should schedule an appointment with neurologist to discuss this matter.  I would recommend Dr. Maier or one of her partners at the [facility] of neurology [phone].  I sent their office a referral.  Please call them to make an appointment.    I would like to schedule a face to face appointment with you and your wife to discuss this further and to discuss strategies to minimize the risk for future strokes. I will have my staff reach out to you so we can schedule a prompt appointment.             Sincerely,    Dr. Fonseca

## 2022-08-04 NOTE — RESULT ENCOUNTER NOTE
Can you call Andres and his wife and make an appointment for Andres with me next week, F2F, OK to use approval only or virtual or same day slot if needed

## 2022-08-05 ENCOUNTER — TELEPHONE (OUTPATIENT)
Dept: FAMILY MEDICINE | Facility: CLINIC | Age: 72
End: 2022-08-05

## 2022-08-05 NOTE — TELEPHONE ENCOUNTER
M for pt to call back. See message below. Pt has an appt scheduled for 9/19 that we can reschedule for next week. MRI follow up.     Romeo Fonseca, MD TANNA Cantu  Can you call Andres and his wife and make an appointment for Andres with me next week, F2F, OK to use approval only or virtual or same day slot if needed       Sera Stokes, CMA

## 2022-08-16 ENCOUNTER — OFFICE VISIT (OUTPATIENT)
Dept: FAMILY MEDICINE | Facility: CLINIC | Age: 72
End: 2022-08-16
Payer: COMMERCIAL

## 2022-08-16 ENCOUNTER — ANCILLARY PROCEDURE (OUTPATIENT)
Dept: CARDIOLOGY | Facility: CLINIC | Age: 72
End: 2022-08-16
Attending: INTERNAL MEDICINE
Payer: COMMERCIAL

## 2022-08-16 VITALS
WEIGHT: 198.3 LBS | TEMPERATURE: 97.1 F | DIASTOLIC BLOOD PRESSURE: 64 MMHG | OXYGEN SATURATION: 96 % | BODY MASS INDEX: 29.37 KG/M2 | HEART RATE: 76 BPM | HEIGHT: 69 IN | RESPIRATION RATE: 20 BRPM | SYSTOLIC BLOOD PRESSURE: 122 MMHG

## 2022-08-16 DIAGNOSIS — I63.9 CRYPTOGENIC STROKE (H): Primary | ICD-10-CM

## 2022-08-16 DIAGNOSIS — R73.01 IFG (IMPAIRED FASTING GLUCOSE): ICD-10-CM

## 2022-08-16 DIAGNOSIS — I10 BENIGN ESSENTIAL HYPERTENSION: ICD-10-CM

## 2022-08-16 DIAGNOSIS — R41.3 MEMORY LOSS: ICD-10-CM

## 2022-08-16 DIAGNOSIS — E78.5 HYPERLIPIDEMIA LDL GOAL <70: ICD-10-CM

## 2022-08-16 DIAGNOSIS — E53.8 VITAMIN B12 DEFICIENCY (NON ANEMIC): ICD-10-CM

## 2022-08-16 DIAGNOSIS — I63.9 CRYPTOGENIC STROKE (H): ICD-10-CM

## 2022-08-16 LAB
HBA1C MFR BLD: 5.9 % (ref 0–5.6)
LDLC SERPL CALC-MCNC: 70 MG/DL
VIT B12 SERPL-MCNC: >4000 PG/ML (ref 232–1245)

## 2022-08-16 PROCEDURE — 93272 ECG/REVIEW INTERPRET ONLY: CPT | Performed by: INTERNAL MEDICINE

## 2022-08-16 PROCEDURE — 83721 ASSAY OF BLOOD LIPOPROTEIN: CPT | Performed by: INTERNAL MEDICINE

## 2022-08-16 PROCEDURE — 99214 OFFICE O/P EST MOD 30 MIN: CPT | Performed by: INTERNAL MEDICINE

## 2022-08-16 PROCEDURE — 82607 VITAMIN B-12: CPT | Performed by: INTERNAL MEDICINE

## 2022-08-16 PROCEDURE — 83036 HEMOGLOBIN GLYCOSYLATED A1C: CPT | Performed by: INTERNAL MEDICINE

## 2022-08-16 PROCEDURE — 93270 REMOTE 30 DAY ECG REV/REPORT: CPT | Performed by: INTERNAL MEDICINE

## 2022-08-16 PROCEDURE — 36415 COLL VENOUS BLD VENIPUNCTURE: CPT | Performed by: INTERNAL MEDICINE

## 2022-08-16 ASSESSMENT — PAIN SCALES - GENERAL: PAINLEVEL: NO PAIN (0)

## 2022-08-16 NOTE — PATIENT INSTRUCTIONS
Please call [facility] at [phone] to schedule your carotid artery ultrasound.    Call [phone] to schedule your echocardiogram (ultrasound of the heart) and ZIO patch heart monitor test.

## 2022-08-16 NOTE — RESULT ENCOUNTER NOTE
The following letter pertains to your most recent diagnostic tests:    Good news! The cholesterol is at goal.    -Your hemoglobin A1c test which is a diabetes blood test that represents and average of your blood sugars over the last 3 months returned at 5.9 which is in the 'pre-diabetes' range.  The best way to prevent pre diabetes from turning into diabetes is to try to loose weight (about 10-12 lbs in your case).  Reducing diet carbohydrates (starches and sugars) improves blood sugar too.        Sincerely,    Dr. Fonseca

## 2022-08-16 NOTE — PATIENT INSTRUCTIONS
The patient was educated on the purpose and function of a cardiac event monitor as well as when and where to return the monitor.  After the patient demonstrated an understanding, monitor s/n [id number] was applied to the patient.

## 2022-08-16 NOTE — PROGRESS NOTES
Assessment & Plan     Cryptogenic stroke (H)  Check for potential stroke sources to mitigate risk for future strokes  Start aspirin for secondary prevention   - Adult Cardiac Event Monitor; Future  - Echocardiogram Complete; Future  - US Carotid Bilateral; Future  - aspirin (ASA) 81 MG EC tablet; Take 1 tablet (81 mg) by mouth daily    Hyperlipidemia LDL goal <70  Recheck lipids as they have not been checked since end of 2020 modifiable risk factor for stroke   - LDL cholesterol direct; Future  - LDL cholesterol direct    Benign essential hypertension  Second check reading OK     IFG (impaired fasting glucose)  Check this as it is a modifiable risk factor for stroke   - Hemoglobin A1c; Future  - Hemoglobin A1c    Memory loss  Not sure if stroke is cause for memory complaints  Hopefully neurology can help us sort that out    Vitamin B12 deficiency (non anemic)  Recheck B12 level since he started a supplement   - Vitamin B12; Future  - Vitamin B12      31 minutes spent on the date of the encounter doing chart review, history and exam, documentation and further activities per the note      Return in about 3 months (around 11/9/2022) for Preventive Visit.  Patient instructed to return to clinic or contact us sooner if symptoms worsen or new symptoms develop or pending work up findings.     Romeo Fonseca MD  [facility] DEA Campos is a 72 year old accompanied by his spouse, presenting for the following health issues:  Follow Up (MRI)      History of Present Illness       Reason for visit:  Memory problems  Symptom onset:  More than a month  Symptoms include:  Just forget what I was trying to do or the name of something.  Symptom intensity:  Mild  Symptom progression:  Staying the same  Had these symptoms before:  Yes  Has tried/received treatment for these symptoms:  No  What makes it worse:  No  What makes it better:  Delvis consumes 1 sweetened beverage(s) daily. He exercises with enough  "effort to increase his heart rate 4 days per week.   He is taking medications regularly.         MRI for memory work up showed incidental old stroke  He denies vision change, dysarthria, focal weakness or numbness  He has an appointment to see neurology at the beginning of next month   He has started a B12 supplement     Review of Systems         Objective    /64 (BP Location: Left arm, Patient Position: Sitting, Cuff Size: Adult Large)   Pulse 76   Temp 97.1  F (36.2  C) (Temporal)   Resp 20   Ht 1.753 m (5' 9\")   Wt 89.9 kg (198 lb 4.8 oz)   SpO2 96%   BMI 29.28 kg/m    Body mass index is 29.28 kg/m .  Physical Exam   GENERAL: healthy, alert and no distress  EYES: Eyes grossly normal to inspection, PERRL and conjunctivae and sclerae normal  HENT: ear canals and TM's normal, nose and mouth without ulcers or lesions  NECK: no adenopathy, no asymmetry, masses, or scars and thyroid normal to palpation  RESP: lungs clear to auscultation - no rales, rhonchi or wheezes  CV: Heart with regular rate and rhythm. No obvious carotid bruits.  ABDOMEN: soft, nontender, no hepatosplenomegaly, no masses and bowel sounds normal  MS: no gross musculoskeletal defects noted, no edema  NEURO: Alert and oriented to person, place and time. Mild memory problems noted.  Cranial nerves 2-12 appear grossly intact.   No pronator drift.  Passes the 'get up and go' test.  Negative Romberg test.  Cerebellar testing OK.  He uses crutches due to hip injury for which he is seeing orthopedics.  No focal weakness.   PSYCH: affect normal/bright and appearance well groomed                    .  ..  "

## 2022-08-17 ENCOUNTER — HOSPITAL ENCOUNTER (OUTPATIENT)
Dept: ULTRASOUND IMAGING | Facility: CLINIC | Age: 72
Discharge: HOME | End: 2022-08-17
Attending: INTERNAL MEDICINE | Admitting: INTERNAL MEDICINE
Payer: COMMERCIAL

## 2022-08-17 DIAGNOSIS — I63.9 CRYPTOGENIC STROKE (H): ICD-10-CM

## 2022-08-17 PROCEDURE — 93880 EXTRACRANIAL BILAT STUDY: CPT

## 2022-08-17 NOTE — RESULT ENCOUNTER NOTE
The following letter pertains to your most recent diagnostic tests:    The supplement that you are taking for B vitamins seems to be working.  You can continue taking it for now, but bring the bottle with you to our next clinic visit so we can look at the dose and see if we can suggest a change that may not cause a supra therapeutic B12 level.         Sincerely,    Dr. Fonseca

## 2022-09-01 ENCOUNTER — TRANSFERRED RECORDS (OUTPATIENT)
Dept: HEALTH INFORMATION MANAGEMENT | Facility: CLINIC | Age: 72
End: 2022-09-01

## 2022-09-01 LAB
ALT SERPL-CCNC: 16 IU/L (ref 0–44)
AST SERPL-CCNC: 17 IU/L (ref 0–40)
CREATININE (EXTERNAL): 1.62 MG/DL (ref 0.76–1.27)
GFR ESTIMATED (EXTERNAL): 45 ML/MIN/1.73
GLUCOSE (EXTERNAL): 101 MG/DL (ref 65–99)
POTASSIUM (EXTERNAL): 5.2 MMOL/L (ref 3.5–5.2)
TSH SERPL-ACNC: 5.18 UIU/ML (ref 0.45–4.5)

## 2022-09-22 ENCOUNTER — MYC MEDICAL ADVICE (OUTPATIENT)
Dept: FAMILY MEDICINE | Facility: CLINIC | Age: 72
End: 2022-09-22

## 2022-09-23 NOTE — TELEPHONE ENCOUNTER
"Writer called [facility]  To clarify when results will be finalized, still \"in process\"    Messag send message to cardiology team to check on results.       Capri Casiano RN  [facility]    "

## 2022-09-28 NOTE — RESULT ENCOUNTER NOTE
The following letter pertains to your most recent diagnostic tests:    Good news! The heart monitor test did NOT show any atrial fibrillation.         Sincerely,    Dr. Fonseca

## 2022-10-23 ENCOUNTER — HEALTH MAINTENANCE LETTER (OUTPATIENT)
Age: 72
End: 2022-10-23

## 2022-10-28 ENCOUNTER — TELEPHONE (OUTPATIENT)
Dept: FAMILY MEDICINE | Facility: CLINIC | Age: 72
End: 2022-10-28

## 2022-10-28 DIAGNOSIS — F33.42 RECURRENT MAJOR DEPRESSIVE DISORDER, IN FULL REMISSION (H): ICD-10-CM

## 2022-10-28 NOTE — TELEPHONE ENCOUNTER
Called pt to schedule annual Comprehensive medication review per [facility]'s request. No answer, message left     Yamilex Siu, PharmD, BCPP  Medication Therapy Management Pharmacist  [facility]

## 2022-10-31 RX ORDER — BUPROPION HYDROCHLORIDE 150 MG/1
TABLET ORAL
Qty: 90 TABLET | Refills: 0 | Status: SHIPPED | OUTPATIENT
Start: 2022-10-31 | End: 2023-01-31

## 2022-10-31 RX ORDER — SERTRALINE HYDROCHLORIDE 100 MG/1
TABLET, FILM COATED ORAL
Qty: 180 TABLET | Refills: 0 | Status: SHIPPED | OUTPATIENT
Start: 2022-10-31 | End: 2023-01-31

## 2022-10-31 NOTE — TELEPHONE ENCOUNTER
Prescription approved per [facility] Refill Protocol.  Shanthi CHAU RN  [facility]

## 2022-11-15 ENCOUNTER — MYC MEDICAL ADVICE (OUTPATIENT)
Dept: FAMILY MEDICINE | Facility: CLINIC | Age: 72
End: 2022-11-15

## 2022-11-15 ENCOUNTER — LAB (OUTPATIENT)
Dept: LAB | Facility: CLINIC | Age: 72
End: 2022-11-15
Payer: COMMERCIAL

## 2022-11-15 DIAGNOSIS — E03.9 MYXEDEMA HEART DISEASE: Primary | ICD-10-CM

## 2022-11-15 DIAGNOSIS — I51.9 MYXEDEMA HEART DISEASE: Primary | ICD-10-CM

## 2022-11-15 DIAGNOSIS — E53.8 BIOTIN-(PROPIONYL-COA-CARBOXYLASE) LIGASE DEFICIENCY: ICD-10-CM

## 2022-11-15 DIAGNOSIS — Z12.5 SCREENING FOR PROSTATE CANCER: ICD-10-CM

## 2022-11-15 LAB
FOLATE SERPL-MCNC: 16.1 NG/ML (ref 4.6–34.8)
T3FREE SERPL-MCNC: 2.1 PG/ML (ref 2–4.4)
T4 FREE SERPL-MCNC: 0.88 NG/DL (ref 0.9–1.7)
TSH SERPL DL<=0.005 MIU/L-ACNC: 5 UIU/ML (ref 0.3–4.2)
VIT B12 SERPL-MCNC: 1268 PG/ML (ref 232–1245)

## 2022-11-15 PROCEDURE — 36415 COLL VENOUS BLD VENIPUNCTURE: CPT

## 2022-11-15 PROCEDURE — 84446 ASSAY OF VITAMIN E: CPT | Mod: 90

## 2022-11-15 PROCEDURE — 84443 ASSAY THYROID STIM HORMONE: CPT

## 2022-11-15 PROCEDURE — 84481 FREE ASSAY (FT-3): CPT

## 2022-11-15 PROCEDURE — 82746 ASSAY OF FOLIC ACID SERUM: CPT

## 2022-11-15 PROCEDURE — 99000 SPECIMEN HANDLING OFFICE-LAB: CPT

## 2022-11-15 PROCEDURE — 84439 ASSAY OF FREE THYROXINE: CPT

## 2022-11-15 PROCEDURE — 82607 VITAMIN B-12: CPT

## 2022-11-17 ASSESSMENT — ASTHMA QUESTIONNAIRES
QUESTION_4 LAST FOUR WEEKS HOW OFTEN HAVE YOU USED YOUR RESCUE INHALER OR NEBULIZER MEDICATION (SUCH AS ALBUTEROL): ONCE A WEEK OR LESS
ACT_TOTALSCORE: 21
ACT_TOTALSCORE: 21
QUESTION_1 LAST FOUR WEEKS HOW MUCH OF THE TIME DID YOUR ASTHMA KEEP YOU FROM GETTING AS MUCH DONE AT WORK, SCHOOL OR AT HOME: A LITTLE OF THE TIME
QUESTION_3 LAST FOUR WEEKS HOW OFTEN DID YOUR ASTHMA SYMPTOMS (WHEEZING, COUGHING, SHORTNESS OF BREATH, CHEST TIGHTNESS OR PAIN) WAKE YOU UP AT NIGHT OR EARLIER THAN USUAL IN THE MORNING: NOT AT ALL
QUESTION_2 LAST FOUR WEEKS HOW OFTEN HAVE YOU HAD SHORTNESS OF BREATH: ONCE OR TWICE A WEEK
QUESTION_5 LAST FOUR WEEKS HOW WOULD YOU RATE YOUR ASTHMA CONTROL: WELL CONTROLLED

## 2022-11-18 LAB
A-TOCOPHEROL VIT E SERPL-MCNC: 12.5 MG/L
BETA+GAMMA TOCOPHEROL SERPL-MCNC: 0.4 MG/L

## 2022-11-21 ENCOUNTER — OFFICE VISIT (OUTPATIENT)
Dept: FAMILY MEDICINE | Facility: CLINIC | Age: 72
End: 2022-11-21
Payer: COMMERCIAL

## 2022-11-21 VITALS
RESPIRATION RATE: 16 BRPM | HEART RATE: 79 BPM | DIASTOLIC BLOOD PRESSURE: 78 MMHG | BODY MASS INDEX: 26.79 KG/M2 | HEIGHT: 70 IN | TEMPERATURE: 98.2 F | SYSTOLIC BLOOD PRESSURE: 134 MMHG | OXYGEN SATURATION: 97 % | WEIGHT: 187.1 LBS

## 2022-11-21 DIAGNOSIS — Z01.818 PREOP GENERAL PHYSICAL EXAM: Primary | ICD-10-CM

## 2022-11-21 DIAGNOSIS — J45.40 MODERATE PERSISTENT ASTHMA WITHOUT COMPLICATION: ICD-10-CM

## 2022-11-21 DIAGNOSIS — Z12.5 SCREENING FOR PROSTATE CANCER: ICD-10-CM

## 2022-11-21 DIAGNOSIS — E53.8 VITAMIN B12 DEFICIENCY (NON ANEMIC): ICD-10-CM

## 2022-11-21 DIAGNOSIS — I63.9 CRYPTOGENIC STROKE (H): ICD-10-CM

## 2022-11-21 DIAGNOSIS — N40.1 BENIGN PROSTATIC HYPERPLASIA WITH LOWER URINARY TRACT SYMPTOMS, SYMPTOM DETAILS UNSPECIFIED: ICD-10-CM

## 2022-11-21 DIAGNOSIS — I10 BENIGN ESSENTIAL HYPERTENSION: ICD-10-CM

## 2022-11-21 DIAGNOSIS — F33.42 RECURRENT MAJOR DEPRESSIVE DISORDER, IN FULL REMISSION (H): ICD-10-CM

## 2022-11-21 DIAGNOSIS — R73.01 IFG (IMPAIRED FASTING GLUCOSE): ICD-10-CM

## 2022-11-21 DIAGNOSIS — Z23 HIGH PRIORITY FOR 2019-NCOV VACCINE: ICD-10-CM

## 2022-11-21 DIAGNOSIS — R94.6 BORDERLINE ABNORMAL TFTS: ICD-10-CM

## 2022-11-21 DIAGNOSIS — R41.3 MEMORY LOSS: ICD-10-CM

## 2022-11-21 DIAGNOSIS — K40.90 UNILATERAL INGUINAL HERNIA WITHOUT OBSTRUCTION OR GANGRENE, RECURRENCE NOT SPECIFIED: ICD-10-CM

## 2022-11-21 DIAGNOSIS — E78.5 HYPERLIPIDEMIA LDL GOAL <70: ICD-10-CM

## 2022-11-21 LAB
HBA1C MFR BLD: 5.8 % (ref 0–5.6)
HGB BLD-MCNC: 13.5 G/DL (ref 13.3–17.7)

## 2022-11-21 PROCEDURE — 84295 ASSAY OF SERUM SODIUM: CPT | Performed by: INTERNAL MEDICINE

## 2022-11-21 PROCEDURE — 83036 HEMOGLOBIN GLYCOSYLATED A1C: CPT | Performed by: INTERNAL MEDICINE

## 2022-11-21 PROCEDURE — 85018 HEMOGLOBIN: CPT | Performed by: INTERNAL MEDICINE

## 2022-11-21 PROCEDURE — 84132 ASSAY OF SERUM POTASSIUM: CPT | Performed by: INTERNAL MEDICINE

## 2022-11-21 PROCEDURE — 84520 ASSAY OF UREA NITROGEN: CPT | Performed by: INTERNAL MEDICINE

## 2022-11-21 PROCEDURE — 82565 ASSAY OF CREATININE: CPT | Performed by: INTERNAL MEDICINE

## 2022-11-21 PROCEDURE — 36415 COLL VENOUS BLD VENIPUNCTURE: CPT | Performed by: INTERNAL MEDICINE

## 2022-11-21 PROCEDURE — G0103 PSA SCREENING: HCPCS | Performed by: INTERNAL MEDICINE

## 2022-11-21 PROCEDURE — 0134A COVID-19,PF,MODERNA BIVALENT: CPT | Performed by: INTERNAL MEDICINE

## 2022-11-21 PROCEDURE — 91313 COVID-19,PF,MODERNA BIVALENT: CPT | Performed by: INTERNAL MEDICINE

## 2022-11-21 PROCEDURE — 99214 OFFICE O/P EST MOD 30 MIN: CPT | Mod: 25 | Performed by: INTERNAL MEDICINE

## 2022-11-21 PROCEDURE — 93000 ELECTROCARDIOGRAM COMPLETE: CPT | Performed by: INTERNAL MEDICINE

## 2022-11-21 ASSESSMENT — PAIN SCALES - GENERAL: PAINLEVEL: NO PAIN (0)

## 2022-11-21 NOTE — H&P (VIEW-ONLY)
[address], [address]  [facility] [address]  Phone: [phone]  Primary Provider: Jay Sesay  Pre-op Performing Provider: JAY SESAY      PREOPERATIVE EVALUATION:  Today's date: 11/21/2022    Dionte Mackey is a 72 year old male who presents for a preoperative evaluation.    Surgical Information:  Surgery/Procedure: hernia   Surgery Location: [facility]  Surgeon: Dr. Jolly  Surgery Date: 12-9-2022  Time of Surgery: 8:30am  Where patient plans to recover: At home with family  Fax number for surgical facility: [phone]    Type of Anesthesia Anticipated: General    Assessment & Plan     The proposed surgical procedure is considered INTERMEDIATE risk.    Preop general physical exam  If labs and EKG are OK, then he is a suitable candidate for surgery   - Sodium; Future  - Potassium; Future  - Creatinine; Future  - Urea nitrogen; Future  - Hemoglobin; Future  - EKG 12-lead complete w/read - Clinics    Unilateral inguinal hernia without obstruction or gangrene, recurrence not specified      Benign essential hypertension  Well controlled; hold hydrochlorothiazide on AM of surgery     Hyperlipidemia LDL goal <70  On statin therapy     Recurrent major depressive disorder, in full remission (H)  Stable     Moderate persistent asthma without complication  Stable     Cryptogenic stroke (H)  On ASA for secondary prevention; can hold leading up to surgery; restart as soon as appropriate following surgery     Benign prostatic hyperplasia with lower urinary tract symptoms, symptom details unspecified  Back on flomax leading up to surgery     Memory loss  He had neuropsyc testing; working with OT; replacing B12; TSH is only mildly abnormal, I doubt replacing thyroid hormone will help much     IFG (impaired fasting glucose)  Recheck   - Hemoglobin A1c; Future    Vitamin B12 deficiency (non anemic)  See discussion above; Dr. Maier adjusted B12 dose; follow up level is OK      Borderline abnormal TFTs  See discussion above              Risks and Recommendations:  The patient has the following additional risks and recommendations for perioperative complications:   - Consult Hospitalist / IM to assist with post-op medical management   - History of delirium or dementia  Cardiovascular:   - he can perform 4METS without chest pain or dyspnea (flight of stairs)  Pulmonary:    - Incentive spirometry post-op   - Consider Respiratory Therapy (Respiratory Care IP Consult) post-op    Medication Instructions:  Patient is to take all scheduled medications on the day of surgery EXCEPT for modifications listed below:   - aspirin: Discontinue aspirin 7-10 days prior to procedure to reduce bleeding risk. It should be resumed postoperatively.    - Diuretics: HOLD on the day of surgery.    RECOMMENDATION:  APPROVAL GIVEN to proceed with proposed procedure, without further diagnostic evaluation.    Answers for HPI/ROS submitted by the patient on 11/21/2022  What is the reason for your visit today? : Pre 0p physical for heria surgery  How many servings of fruits and vegetables do you eat daily?: 2-3  On average, how many sweetened beverages do you drink each day (Examples: soda, juice, sweet tea, etc.  Do NOT count diet or artificially sweetened beverages)?: 1  How many minutes a day do you exercise enough to make your heart beat faster?: 10 to 19  How many days a week do you exercise enough to make your heart beat faster?: 3 or less  How many days per week do you miss taking your medication?: 0              28 minutes spent on the date of the encounter doing chart review, history and exam, documentation and further activities per the note        Subjective     HPI related to upcoming procedure: Several months of symptom related to right inguinal hernia.  This patient reports being able to perform 4 METS of physical activity without chest pain or dyspnea. History of stroke and memory loss.         Preop  Questions 11/21/2022   1. Have you ever had a heart attack or stroke? No   2. Have you ever had surgery on your heart or blood vessels, such as a stent placement, a coronary artery bypass, or surgery on an artery in your head, neck, heart, or legs? No   3. Do you have chest pain with activity? No   4. Do you have a history of  heart failure? No   5. Do you currently have a cold, bronchitis or symptoms of other infection? No   6. Do you have a cough, shortness of breath, or wheezing? No   7. Do you or anyone in your family have previous history of blood clots? No   8. Do you or does anyone in your family have a serious bleeding problem such as prolonged bleeding following surgeries or cuts? No   9. Have you ever had problems with anemia or been told to take iron pills? No   10. Have you had any abnormal blood loss such as black, tarry or bloody stools? No   11. Have you ever had a blood transfusion? No   12. Are you willing to have a blood transfusion if it is medically needed before, during, or after your surgery? Yes   13. Have you or any of your relatives ever had problems with anesthesia? YES - sister had adverse reaction to sodium pentothal in his recollection (she passed away); he has had several surgeries with general anesthesia without any problems   14. Do you have sleep apnea, excessive snoring or daytime drowsiness? No   15. Do you have any artifical heart valves or other implanted medical devices like a pacemaker, defibrillator, or continuous glucose monitor? No   16. Do you have artificial joints? No   17. Are you allergic to latex? No       Review of Systems  Constitutional, neuro, ENT, endocrine, pulmonary, cardiac, gastrointestinal, genitourinary, musculoskeletal, integument and psychiatric systems are negative, except as otherwise noted.    Patient Active Problem List    Diagnosis Date Noted     IFG (impaired fasting glucose) 08/16/2022     Priority: Medium     Cryptogenic stroke (H) 08/16/2022      Priority: Medium     Memory loss 08/16/2022     Priority: Medium     Hyperlipidemia LDL goal <70 10/27/2017     Priority: Medium     Recurrent major depressive disorder, in full remission (H) 11/09/2016     Priority: Medium     Non morbid obesity due to excess calories 11/09/2016     Priority: Medium     Benign essential hypertension 10/18/2016     Priority: Medium     Seasonal allergic rhinitis 10/18/2016     Priority: Medium     Moderate persistent asthma without complication 10/18/2016     Priority: Medium     Vasculogenic erectile dysfunction 10/18/2016     Priority: Medium     Herpes zoster without complication 10/18/2016     Priority: Medium     BPH (benign prostatic hyperplasia) 04/03/2015     Priority: Medium     Hematuria 04/03/2015     Priority: Medium      Past Medical History:   Diagnosis Date     Arthritis      BPH (benign prostatic hyperplasia)      Cryptogenic stroke (H) 8/16/2022     Depression      Depressive disorder 2005     Hypertension      Non morbid obesity due to excess calories 11/9/2016     Recurrent major depressive disorder, in full remission (H) 11/9/2016     Uncomplicated asthma      Past Surgical History:   Procedure Laterality Date     BACK SURGERY      cervical fusion,neck fx repair     COLONOSCOPY       CYSTOSCOPY, TRANSURETHRAL RESECTION (TUR) PROSTATE, COMBINED N/A 4/3/2015    Procedure: COMBINED CYSTOSCOPY, TRANSURETHRAL RESECTION (TUR) PROSTATE;  Surgeon: Bakari Barrios MD;  Location: [facility] OR     ENT SURGERY      SINUS SURGERY X 3,SEPTOPLASTY,TONSILLECTOMY     EYE SURGERY  11/10/2017     FOOT SURGERY Right      GENITOURINARY SURGERY      TURP IN 2010     HEAD & NECK SURGERY  10/15/2017     TONSILLECTOMY       Current Outpatient Medications   Medication Sig Dispense Refill     albuterol (PROAIR HFA/PROVENTIL HFA/VENTOLIN HFA) 108 (90 Base) MCG/ACT inhaler USE 2 INHALATIONS EVERY 6 HOURS 8.5 g 11     aspirin (ASA) 81 MG EC tablet Take 1 tablet (81 mg) by mouth daily        "atorvastatin (LIPITOR) 20 MG tablet TAKE 1 TABLET DAILY (SCHEDULE APPOINTMENT TO DISCUSS FOR FURTHER REFILLS ) 90 tablet 1     buPROPion (WELLBUTRIN XL) 150 MG 24 hr tablet TAKE 1 TABLET EVERY MORNING 90 tablet 0     DULERA 200-5 MCG/ACT inhaler USE 2 INHALATIONS TWICE A DAY (DUE FOR A FOLLOW UP IN CLINIC, CALL [phone] TO SCHEDULE) 39 g 3     hydrochlorothiazide (HYDRODIURIL) 25 MG tablet TAKE 1 TABLET DAILY 90 tablet 3     lisinopril (ZESTRIL) 30 MG tablet TAKE 1 TABLET DAILY 90 tablet 3     oxybutynin ER (DITROPAN-XL) 5 MG 24 hr tablet TAKE 1 TABLET DAILY 30 tablet 0     sertraline (ZOLOFT) 100 MG tablet TAKE 1 TABLET TWICE A  tablet 0     tamsulosin (FLOMAX) 0.4 MG capsule TAKE 1 CAPSULE BY MOUTH EVERY DAY 90 capsule 1     valACYclovir (VALTREX) 1000 mg tablet Take 2 tablets (2,000 mg) by mouth 2 times daily 4 tablet 11       Allergies   Allergen Reactions     Ciprofloxacin Shortness Of Breath, Itching and Difficulty breathing     Moxifloxacin Hives        Social History     Tobacco Use     Smoking status: Never     Smokeless tobacco: Never   Substance Use Topics     Alcohol use: Yes     Alcohol/week: 3.0 - 4.0 standard drinks     Comment: 3 drinks on weekend days     Family History   Problem Relation Age of Onset     Colon Cancer Father 70     Coronary Artery Disease No family hx of      Cerebrovascular Disease No family hx of      Diabetes No family hx of      History   Drug Use No         Objective     /78 (BP Location: Right arm, Patient Position: Sitting, Cuff Size: Adult Large)   Pulse 79   Temp 98.2  F (36.8  C) (Tympanic)   Resp 16   Ht 1.765 m (5' 9.5\")   Wt 84.9 kg (187 lb 1.6 oz)   SpO2 97%   BMI 27.23 kg/m      Physical Exam    GENERAL APPEARANCE: healthy, alert and no distress     EYES: EOMI,  PERRL     HENT: ear canals and TM's normal and nose and mouth without ulcers or lesions     NECK: no adenopathy, no asymmetry, masses, or scars and thyroid normal to " palpation     RESP: lungs clear to auscultation - no rales, rhonchi or wheezes     CV: regular rates and rhythm, normal S1 S2, no S3 or S4 and no murmur, click or rub     ABDOMEN:  soft, nontender, no HSM or masses and bowel sounds normal     MS: extremities normal- no gross deformities noted, no evidence of inflammation in joints, FROM in all extremities.     SKIN: no suspicious lesions or rashes     NEURO: Normal strength and tone, sensory exam grossly normal, mentation intact and speech normal     PSYCH: mentation appears normal. and affect normal/bright     LYMPHATICS: No cervical adenopathy    Recent Labs   Lab Test 08/16/22  1050 07/27/22  1330   NA  --  134   A1C 5.9*  --         Diagnostics:  Labs pending      EKG pending     Revised Cardiac Risk Index (RCRI):  The patient has the following serious cardiovascular risks for perioperative complications:   - Cerebrovascular Disease (TIA or CVA) = 1 point     RCRI Interpretation: 2 points: Class III (moderate risk - 6.6% complication rate)     Estimated Functional Capacity: Performs 4 METS exercise without symptoms (e.g., light housework, stairs, 4 mph walk, 7 mph bike, slow step dance)           Signed Electronically by: Romeo Fonseca MD  Copy of this evaluation report is provided to requesting physician.

## 2022-11-21 NOTE — PROGRESS NOTES
[address], [address]  [facility] [address]  Phone: [phone]  Primary Provider: Jay Sesay  Pre-op Performing Provider: JAY SESAY      PREOPERATIVE EVALUATION:  Today's date: 11/21/2022    Dionte Mackey is a 72 year old male who presents for a preoperative evaluation.    Surgical Information:  Surgery/Procedure: hernia   Surgery Location: [facility]  Surgeon: Dr. Jolly  Surgery Date: 12-9-2022  Time of Surgery: 8:30am  Where patient plans to recover: At home with family  Fax number for surgical facility: [phone]    Type of Anesthesia Anticipated: General    Assessment & Plan     The proposed surgical procedure is considered INTERMEDIATE risk.    Preop general physical exam  If labs and EKG are OK, then he is a suitable candidate for surgery   - Sodium; Future  - Potassium; Future  - Creatinine; Future  - Urea nitrogen; Future  - Hemoglobin; Future  - EKG 12-lead complete w/read - Clinics    Unilateral inguinal hernia without obstruction or gangrene, recurrence not specified      Benign essential hypertension  Well controlled; hold hydrochlorothiazide on AM of surgery     Hyperlipidemia LDL goal <70  On statin therapy     Recurrent major depressive disorder, in full remission (H)  Stable     Moderate persistent asthma without complication  Stable     Cryptogenic stroke (H)  On ASA for secondary prevention; can hold leading up to surgery; restart as soon as appropriate following surgery     Benign prostatic hyperplasia with lower urinary tract symptoms, symptom details unspecified  Back on flomax leading up to surgery     Memory loss  He had neuropsyc testing; working with OT; replacing B12; TSH is only mildly abnormal, I doubt replacing thyroid hormone will help much     IFG (impaired fasting glucose)  Recheck   - Hemoglobin A1c; Future    Vitamin B12 deficiency (non anemic)  See discussion above; Dr. Maier adjusted B12 dose; follow up level is OK      Borderline abnormal TFTs  See discussion above              Risks and Recommendations:  The patient has the following additional risks and recommendations for perioperative complications:   - Consult Hospitalist / IM to assist with post-op medical management   - History of delirium or dementia  Cardiovascular:   - he can perform 4METS without chest pain or dyspnea (flight of stairs)  Pulmonary:    - Incentive spirometry post-op   - Consider Respiratory Therapy (Respiratory Care IP Consult) post-op    Medication Instructions:  Patient is to take all scheduled medications on the day of surgery EXCEPT for modifications listed below:   - aspirin: Discontinue aspirin 7-10 days prior to procedure to reduce bleeding risk. It should be resumed postoperatively.    - Diuretics: HOLD on the day of surgery.    RECOMMENDATION:  APPROVAL GIVEN to proceed with proposed procedure, without further diagnostic evaluation.    Answers for HPI/ROS submitted by the patient on 11/21/2022  What is the reason for your visit today? : Pre 0p physical for heria surgery  How many servings of fruits and vegetables do you eat daily?: 2-3  On average, how many sweetened beverages do you drink each day (Examples: soda, juice, sweet tea, etc.  Do NOT count diet or artificially sweetened beverages)?: 1  How many minutes a day do you exercise enough to make your heart beat faster?: 10 to 19  How many days a week do you exercise enough to make your heart beat faster?: 3 or less  How many days per week do you miss taking your medication?: 0              28 minutes spent on the date of the encounter doing chart review, history and exam, documentation and further activities per the note        Subjective     HPI related to upcoming procedure: Several months of symptom related to right inguinal hernia.  This patient reports being able to perform 4 METS of physical activity without chest pain or dyspnea. History of stroke and memory loss.         Preop  Questions 11/21/2022   1. Have you ever had a heart attack or stroke? No   2. Have you ever had surgery on your heart or blood vessels, such as a stent placement, a coronary artery bypass, or surgery on an artery in your head, neck, heart, or legs? No   3. Do you have chest pain with activity? No   4. Do you have a history of  heart failure? No   5. Do you currently have a cold, bronchitis or symptoms of other infection? No   6. Do you have a cough, shortness of breath, or wheezing? No   7. Do you or anyone in your family have previous history of blood clots? No   8. Do you or does anyone in your family have a serious bleeding problem such as prolonged bleeding following surgeries or cuts? No   9. Have you ever had problems with anemia or been told to take iron pills? No   10. Have you had any abnormal blood loss such as black, tarry or bloody stools? No   11. Have you ever had a blood transfusion? No   12. Are you willing to have a blood transfusion if it is medically needed before, during, or after your surgery? Yes   13. Have you or any of your relatives ever had problems with anesthesia? YES - sister had adverse reaction to sodium pentothal in his recollection (she passed away); he has had several surgeries with general anesthesia without any problems   14. Do you have sleep apnea, excessive snoring or daytime drowsiness? No   15. Do you have any artifical heart valves or other implanted medical devices like a pacemaker, defibrillator, or continuous glucose monitor? No   16. Do you have artificial joints? No   17. Are you allergic to latex? No       Review of Systems  Constitutional, neuro, ENT, endocrine, pulmonary, cardiac, gastrointestinal, genitourinary, musculoskeletal, integument and psychiatric systems are negative, except as otherwise noted.    Patient Active Problem List    Diagnosis Date Noted     IFG (impaired fasting glucose) 08/16/2022     Priority: Medium     Cryptogenic stroke (H) 08/16/2022      Priority: Medium     Memory loss 08/16/2022     Priority: Medium     Hyperlipidemia LDL goal <70 10/27/2017     Priority: Medium     Recurrent major depressive disorder, in full remission (H) 11/09/2016     Priority: Medium     Non morbid obesity due to excess calories 11/09/2016     Priority: Medium     Benign essential hypertension 10/18/2016     Priority: Medium     Seasonal allergic rhinitis 10/18/2016     Priority: Medium     Moderate persistent asthma without complication 10/18/2016     Priority: Medium     Vasculogenic erectile dysfunction 10/18/2016     Priority: Medium     Herpes zoster without complication 10/18/2016     Priority: Medium     BPH (benign prostatic hyperplasia) 04/03/2015     Priority: Medium     Hematuria 04/03/2015     Priority: Medium      Past Medical History:   Diagnosis Date     Arthritis      BPH (benign prostatic hyperplasia)      Cryptogenic stroke (H) 8/16/2022     Depression      Depressive disorder 2005     Hypertension      Non morbid obesity due to excess calories 11/9/2016     Recurrent major depressive disorder, in full remission (H) 11/9/2016     Uncomplicated asthma      Past Surgical History:   Procedure Laterality Date     BACK SURGERY      cervical fusion,neck fx repair     COLONOSCOPY       CYSTOSCOPY, TRANSURETHRAL RESECTION (TUR) PROSTATE, COMBINED N/A 4/3/2015    Procedure: COMBINED CYSTOSCOPY, TRANSURETHRAL RESECTION (TUR) PROSTATE;  Surgeon: Bakari Barrios MD;  Location: [facility] OR     ENT SURGERY      SINUS SURGERY X 3,SEPTOPLASTY,TONSILLECTOMY     EYE SURGERY  11/10/2017     FOOT SURGERY Right      GENITOURINARY SURGERY      TURP IN 2010     HEAD & NECK SURGERY  10/15/2017     TONSILLECTOMY       Current Outpatient Medications   Medication Sig Dispense Refill     albuterol (PROAIR HFA/PROVENTIL HFA/VENTOLIN HFA) 108 (90 Base) MCG/ACT inhaler USE 2 INHALATIONS EVERY 6 HOURS 8.5 g 11     aspirin (ASA) 81 MG EC tablet Take 1 tablet (81 mg) by mouth daily        "atorvastatin (LIPITOR) 20 MG tablet TAKE 1 TABLET DAILY (SCHEDULE APPOINTMENT TO DISCUSS FOR FURTHER REFILLS ) 90 tablet 1     buPROPion (WELLBUTRIN XL) 150 MG 24 hr tablet TAKE 1 TABLET EVERY MORNING 90 tablet 0     DULERA 200-5 MCG/ACT inhaler USE 2 INHALATIONS TWICE A DAY (DUE FOR A FOLLOW UP IN CLINIC, CALL [phone] TO SCHEDULE) 39 g 3     hydrochlorothiazide (HYDRODIURIL) 25 MG tablet TAKE 1 TABLET DAILY 90 tablet 3     lisinopril (ZESTRIL) 30 MG tablet TAKE 1 TABLET DAILY 90 tablet 3     oxybutynin ER (DITROPAN-XL) 5 MG 24 hr tablet TAKE 1 TABLET DAILY 30 tablet 0     sertraline (ZOLOFT) 100 MG tablet TAKE 1 TABLET TWICE A  tablet 0     tamsulosin (FLOMAX) 0.4 MG capsule TAKE 1 CAPSULE BY MOUTH EVERY DAY 90 capsule 1     valACYclovir (VALTREX) 1000 mg tablet Take 2 tablets (2,000 mg) by mouth 2 times daily 4 tablet 11       Allergies   Allergen Reactions     Ciprofloxacin Shortness Of Breath, Itching and Difficulty breathing     Moxifloxacin Hives        Social History     Tobacco Use     Smoking status: Never     Smokeless tobacco: Never   Substance Use Topics     Alcohol use: Yes     Alcohol/week: 3.0 - 4.0 standard drinks     Comment: 3 drinks on weekend days     Family History   Problem Relation Age of Onset     Colon Cancer Father 70     Coronary Artery Disease No family hx of      Cerebrovascular Disease No family hx of      Diabetes No family hx of      History   Drug Use No         Objective     /78 (BP Location: Right arm, Patient Position: Sitting, Cuff Size: Adult Large)   Pulse 79   Temp 98.2  F (36.8  C) (Tympanic)   Resp 16   Ht 1.765 m (5' 9.5\")   Wt 84.9 kg (187 lb 1.6 oz)   SpO2 97%   BMI 27.23 kg/m      Physical Exam    GENERAL APPEARANCE: healthy, alert and no distress     EYES: EOMI,  PERRL     HENT: ear canals and TM's normal and nose and mouth without ulcers or lesions     NECK: no adenopathy, no asymmetry, masses, or scars and thyroid normal to " palpation     RESP: lungs clear to auscultation - no rales, rhonchi or wheezes     CV: regular rates and rhythm, normal S1 S2, no S3 or S4 and no murmur, click or rub     ABDOMEN:  soft, nontender, no HSM or masses and bowel sounds normal     MS: extremities normal- no gross deformities noted, no evidence of inflammation in joints, FROM in all extremities.     SKIN: no suspicious lesions or rashes     NEURO: Normal strength and tone, sensory exam grossly normal, mentation intact and speech normal     PSYCH: mentation appears normal. and affect normal/bright     LYMPHATICS: No cervical adenopathy    Recent Labs   Lab Test 08/16/22  1050 07/27/22  1330   NA  --  134   A1C 5.9*  --         Diagnostics:  Labs pending      EKG pending     Revised Cardiac Risk Index (RCRI):  The patient has the following serious cardiovascular risks for perioperative complications:   - Cerebrovascular Disease (TIA or CVA) = 1 point     RCRI Interpretation: 2 points: Class III (moderate risk - 6.6% complication rate)     Estimated Functional Capacity: Performs 4 METS exercise without symptoms (e.g., light housework, stairs, 4 mph walk, 7 mph bike, slow step dance)           Signed Electronically by: Romeo Fonseca MD  Copy of this evaluation report is provided to requesting physician.

## 2022-11-22 LAB
BUN SERPL-MCNC: 20 MG/DL (ref 8–23)
CREAT SERPL-MCNC: 1.61 MG/DL (ref 0.67–1.17)
GFR SERPL CREATININE-BSD FRML MDRD: 45 ML/MIN/1.73M2
POTASSIUM SERPL-SCNC: 4.7 MMOL/L (ref 3.4–5.3)
SODIUM SERPL-SCNC: 138 MMOL/L (ref 136–145)

## 2022-11-23 LAB — PSA SERPL-MCNC: 1.23 NG/ML (ref 0–6.5)

## 2022-11-23 NOTE — RESULT ENCOUNTER NOTE
The following letter pertains to your most recent diagnostic tests:    -Your prostate specific antigen (PSA) test result returned normal.     -The electrolytes are OK for surgery.    -The kidney test (creatinine) is suprisingly higher than previous checks.  I would recommend rechecking at this as soon as convenient and prior to surgery.  Sometimes, dehydration causes a spuriously high result.       -The hemoglobin is normal.  No anemia.       -Your hemoglobin A1c test which is a diabetes blood sugar test that represents and average of your blood sugars over the last 3 months returned at 5.8 which is stable for you.      -The EKG is OK for surgery.        Bottom line:  schedule a lab appointment as soon as possible for follow up creatinine level.            Sincerely,    Dr. Fonseca

## 2022-11-28 ENCOUNTER — MYC MEDICAL ADVICE (OUTPATIENT)
Dept: FAMILY MEDICINE | Facility: CLINIC | Age: 72
End: 2022-11-28

## 2022-11-28 DIAGNOSIS — E03.8 SUBCLINICAL HYPOTHYROIDISM: Primary | ICD-10-CM

## 2022-11-29 ENCOUNTER — LAB (OUTPATIENT)
Dept: LAB | Facility: CLINIC | Age: 72
End: 2022-11-29
Payer: COMMERCIAL

## 2022-11-29 DIAGNOSIS — I10 BENIGN ESSENTIAL HYPERTENSION: ICD-10-CM

## 2022-11-29 LAB
CREAT SERPL-MCNC: 1.67 MG/DL (ref 0.67–1.17)
GFR SERPL CREATININE-BSD FRML MDRD: 43 ML/MIN/1.73M2
HOLD SPECIMEN: NORMAL

## 2022-11-29 PROCEDURE — 82565 ASSAY OF CREATININE: CPT

## 2022-11-29 PROCEDURE — 36415 COLL VENOUS BLD VENIPUNCTURE: CPT

## 2022-11-29 RX ORDER — LEVOTHYROXINE SODIUM 25 UG/1
25 TABLET ORAL DAILY
Qty: 90 TABLET | Refills: 3 | Status: SHIPPED | OUTPATIENT
Start: 2022-11-29 | End: 2023-01-02

## 2022-11-30 ENCOUNTER — MYC MEDICAL ADVICE (OUTPATIENT)
Dept: FAMILY MEDICINE | Facility: CLINIC | Age: 72
End: 2022-11-30

## 2022-11-30 DIAGNOSIS — R79.89 ELEVATED SERUM CREATININE: Primary | ICD-10-CM

## 2022-12-01 ENCOUNTER — LAB (OUTPATIENT)
Dept: LAB | Facility: CLINIC | Age: 72
End: 2022-12-01
Payer: COMMERCIAL

## 2022-12-01 DIAGNOSIS — E03.8 SUBCLINICAL HYPOTHYROIDISM: ICD-10-CM

## 2022-12-01 DIAGNOSIS — R79.89 ELEVATED SERUM CREATININE: ICD-10-CM

## 2022-12-01 DIAGNOSIS — N13.30 HYDRONEPHROSIS, UNSPECIFIED HYDRONEPHROSIS TYPE: Primary | ICD-10-CM

## 2022-12-01 DIAGNOSIS — N17.9 AKI (ACUTE KIDNEY INJURY) (H): ICD-10-CM

## 2022-12-01 LAB
ALBUMIN UR-MCNC: NEGATIVE MG/DL
APPEARANCE UR: CLEAR
BACTERIA #/AREA URNS HPF: NORMAL /HPF
BILIRUB UR QL STRIP: NEGATIVE
COLOR UR AUTO: YELLOW
CREAT SERPL-MCNC: 1.64 MG/DL (ref 0.67–1.17)
GLUCOSE UR STRIP-MCNC: NEGATIVE MG/DL
HGB UR QL STRIP: NEGATIVE
KETONES UR STRIP-MCNC: NEGATIVE MG/DL
LEUKOCYTE ESTERASE UR QL STRIP: NEGATIVE
NITRATE UR QL: NEGATIVE
PH UR STRIP: 5.5 [PH] (ref 5–7)
RBC #/AREA URNS AUTO: NORMAL /HPF
SODIUM SERPL-SCNC: 136 MMOL/L (ref 136–145)
SP GR UR STRIP: 1.01 (ref 1–1.03)
SQUAMOUS #/AREA URNS AUTO: NORMAL /LPF
TOTAL PROTEIN SERUM FOR ELP: 7 G/DL (ref 6.4–8.3)
TSH SERPL DL<=0.005 MIU/L-ACNC: 6.16 UIU/ML (ref 0.3–4.2)
UROBILINOGEN UR STRIP-ACNC: 0.2 E.U./DL
WBC #/AREA URNS AUTO: NORMAL /HPF

## 2022-12-01 PROCEDURE — 82565 ASSAY OF CREATININE: CPT

## 2022-12-01 PROCEDURE — 81001 URINALYSIS AUTO W/SCOPE: CPT

## 2022-12-01 PROCEDURE — 84300 ASSAY OF URINE SODIUM: CPT

## 2022-12-01 PROCEDURE — 84155 ASSAY OF PROTEIN SERUM: CPT

## 2022-12-01 PROCEDURE — 36415 COLL VENOUS BLD VENIPUNCTURE: CPT

## 2022-12-01 PROCEDURE — 84439 ASSAY OF FREE THYROXINE: CPT

## 2022-12-01 PROCEDURE — 84165 PROTEIN E-PHORESIS SERUM: CPT

## 2022-12-01 PROCEDURE — 84295 ASSAY OF SERUM SODIUM: CPT

## 2022-12-01 PROCEDURE — 84443 ASSAY THYROID STIM HORMONE: CPT

## 2022-12-01 PROCEDURE — 82570 ASSAY OF URINE CREATININE: CPT

## 2022-12-02 ENCOUNTER — HOSPITAL ENCOUNTER (OUTPATIENT)
Dept: CT IMAGING | Facility: CLINIC | Age: 72
Discharge: HOME | End: 2022-12-02
Attending: INTERNAL MEDICINE
Payer: COMMERCIAL

## 2022-12-02 ENCOUNTER — TELEPHONE (OUTPATIENT)
Dept: UROLOGY | Facility: CLINIC | Age: 72
End: 2022-12-02

## 2022-12-02 ENCOUNTER — HOSPITAL ENCOUNTER (OUTPATIENT)
Dept: ULTRASOUND IMAGING | Facility: CLINIC | Age: 72
Discharge: HOME | End: 2022-12-02
Attending: INTERNAL MEDICINE
Payer: COMMERCIAL

## 2022-12-02 DIAGNOSIS — R79.89 ELEVATED SERUM CREATININE: ICD-10-CM

## 2022-12-02 DIAGNOSIS — N17.9 AKI (ACUTE KIDNEY INJURY) (H): ICD-10-CM

## 2022-12-02 DIAGNOSIS — N13.30 HYDRONEPHROSIS, UNSPECIFIED HYDRONEPHROSIS TYPE: ICD-10-CM

## 2022-12-02 LAB
ALBUMIN SERPL ELPH-MCNC: 4.5 G/DL (ref 3.7–5.1)
ALPHA1 GLOB SERPL ELPH-MCNC: 0.3 G/DL (ref 0.2–0.4)
ALPHA2 GLOB SERPL ELPH-MCNC: 0.7 G/DL (ref 0.5–0.9)
B-GLOBULIN SERPL ELPH-MCNC: 0.7 G/DL (ref 0.6–1)
CREAT UR-MCNC: 50.4 MG/DL
FRACT EXCRET NA UR+SERPL-RTO: 1.5 %
GAMMA GLOB SERPL ELPH-MCNC: 0.9 G/DL (ref 0.7–1.6)
M PROTEIN SERPL ELPH-MCNC: 0 G/DL
PROT PATTERN SERPL ELPH-IMP: NORMAL
SODIUM UR-SCNC: 64 MMOL/L
T4 FREE SERPL-MCNC: 0.89 NG/DL (ref 0.9–1.7)

## 2022-12-02 PROCEDURE — 74176 CT ABD & PELVIS W/O CONTRAST: CPT

## 2022-12-02 PROCEDURE — 76770 US EXAM ABDO BACK WALL COMP: CPT

## 2022-12-02 NOTE — TELEPHONE ENCOUNTER
[facility] PCP:     See below message from patient     Also - Called Urology Scheduling     They have to send an urgent message and will call patient today or Monday to offer an appointment - they are trying to work him in urgently next week     Ligia HYLTON Triage RN  [facility]

## 2022-12-02 NOTE — TELEPHONE ENCOUNTER
[facility] Call Center    Phone Message    May a detailed message be left on voicemail: yes     Reason for Call: Ligia WEBB from [facility] called in regards to getting patient scheduled off of a urgent referral for Hydronephrosis , and patient needing stent placement. She states please call patient to get this scheduled also willing to go to [place] if he can't get an appointment in . Referral states 1-2 days.       Action Taken: Other: Urology    Travel Screening: Not Applicable

## 2022-12-02 NOTE — TELEPHONE ENCOUNTER
High priority message sent to [facility] team and Dr. Eugene as [facility] does not have a provider onsite until 12/8/22.    Ethel Benitez RN, BSN

## 2022-12-03 ENCOUNTER — APPOINTMENT (OUTPATIENT)
Dept: LAB | Facility: CLINIC | Age: 72
End: 2022-12-03
Payer: COMMERCIAL

## 2022-12-03 PROCEDURE — 81050 URINALYSIS VOLUME MEASURE: CPT

## 2022-12-03 PROCEDURE — 84166 PROTEIN E-PHORESIS/URINE/CSF: CPT | Performed by: INTERNAL MEDICINE

## 2022-12-03 NOTE — RESULT ENCOUNTER NOTE
The following letter pertains to your most recent diagnostic tests:    The CT does not show a clear cause for the obstruction and swelling of your left kidney such as infection or cancer.  I think this is good news.  We still need to have you see a urologist to see how we can remedy the problem and if they can shed more light on why the kidney is swollen.  Hopefully, they can see you some time next week.        Sincerely,    Dr. Fonseca

## 2022-12-05 LAB — PROT PATTERN UR ELPH-IMP: NORMAL

## 2022-12-05 NOTE — TELEPHONE ENCOUNTER
Per chart review, urology appointment made for tomorrow:  12/6/2022 10:30 AM (Arrive by 10:15 AM) Wisam Eugene MD [facility] [place] ANDREI Rivera RN  -[facility]

## 2022-12-06 ENCOUNTER — PREP FOR PROCEDURE (OUTPATIENT)
Dept: UROLOGY | Facility: CLINIC | Age: 72
End: 2022-12-06

## 2022-12-06 ENCOUNTER — VIRTUAL VISIT (OUTPATIENT)
Dept: UROLOGY | Facility: CLINIC | Age: 72
End: 2022-12-06
Payer: COMMERCIAL

## 2022-12-06 VITALS — HEIGHT: 69 IN | BODY MASS INDEX: 27.11 KG/M2 | WEIGHT: 183 LBS

## 2022-12-06 DIAGNOSIS — N17.9 AKI (ACUTE KIDNEY INJURY) (H): ICD-10-CM

## 2022-12-06 DIAGNOSIS — N13.30 HYDRONEPHROSIS, UNSPECIFIED HYDRONEPHROSIS TYPE: ICD-10-CM

## 2022-12-06 DIAGNOSIS — N13.5 UPJ (URETEROPELVIC JUNCTION) OBSTRUCTION: Primary | ICD-10-CM

## 2022-12-06 PROCEDURE — 99204 OFFICE O/P NEW MOD 45 MIN: CPT | Mod: 95 | Performed by: UROLOGY

## 2022-12-06 ASSESSMENT — PAIN SCALES - GENERAL: PAINLEVEL: NO PAIN (0)

## 2022-12-06 NOTE — TELEPHONE ENCOUNTER
Per chart review, patient is scheduled for a video visit with Dr. Eugene on 12/6/22.    Ethel Benitez RN, BSN

## 2022-12-06 NOTE — PROGRESS NOTES
Urology Consult History and Physical  [facility]   Name: Dionte Mackey    MRN: [id number]   YOB: 1950       We were asked to see Dionte Mackey at the request of Dr. Fonseca for evaluation and treatment of LEFT hydronephrosis, ARIC.        Chief Complaint:   LEFT hydronephrosis    History is obtained from the patient and medical record            History of Present Illness:   Dionte Mackey is a 72 year old male who is being seen for evaluation of left-sided hydronephrosis with newly diagnosed CKD    Planning for hernia surgery, he has a right inguinal hernia  Pre-op visit with abnormal labs noting a elevated serum creatinine  Follow-up imaging was obtained with left-sided hydronephrosis  He does feel he empties to completion and is overall not very bothered by his LUTS  He is on tamsulosin 0.4 mg daily and oxybutynin 5 mg daily    Previously saw Dr. Barrios  2011 - TURP and cystolitholapaxy   2015 - TURP             Past Medical History:     Past Medical History:   Diagnosis Date     Arthritis      BPH (benign prostatic hyperplasia)      Cryptogenic stroke (H) 08/16/2022     Depression      Depressive disorder 2005     History of spinal cord injury      Hypertension      Mumps      Non morbid obesity due to excess calories 11/09/2016     Recurrent major depressive disorder, in full remission (H) 11/09/2016     Uncomplicated asthma             Past Surgical History:     Past Surgical History:   Procedure Laterality Date     BACK SURGERY      cervical fusion,neck fx repair     COLONOSCOPY       CYSTOSCOPY       CYSTOSCOPY, TRANSURETHRAL RESECTION (TUR) PROSTATE, COMBINED N/A 04/03/2015    Procedure: COMBINED CYSTOSCOPY, TRANSURETHRAL RESECTION (TUR) PROSTATE;  Surgeon: Bakari Barrios MD;  Location:  OR     ENT SURGERY      SINUS SURGERY X 3,SEPTOPLASTY,TONSILLECTOMY     EYE SURGERY  11/10/2017     FOOT SURGERY Right      GENITOURINARY SURGERY      TURP IN 2010     HEAD & NECK SURGERY  10/15/2017      PROSTATE SURGERY       TONSILLECTOMY              Social History:     Social History     Tobacco Use     Smoking status: Never     Smokeless tobacco: Never   Substance Use Topics     Alcohol use: Yes     Alcohol/week: 3.0 - 4.0 standard drinks     Comment: 3 drinks on weekend days       History   Smoking Status     Never   Smokeless Tobacco     Never            Family History:     Family History   Problem Relation Age of Onset     Colon Cancer Father 70     Coronary Artery Disease No family hx of      Cerebrovascular Disease No family hx of      Diabetes No family hx of               Allergies:     Allergies   Allergen Reactions     Ciprofloxacin Shortness Of Breath, Itching and Difficulty breathing     Moxifloxacin Hives            Medications:     Current Outpatient Medications   Medication Sig     albuterol (PROAIR HFA/PROVENTIL HFA/VENTOLIN HFA) 108 (90 Base) MCG/ACT inhaler USE 2 INHALATIONS EVERY 6 HOURS     aspirin (ASA) 81 MG EC tablet Take 1 tablet (81 mg) by mouth daily     atorvastatin (LIPITOR) 20 MG tablet TAKE 1 TABLET DAILY (SCHEDULE APPOINTMENT TO DISCUSS FOR FURTHER REFILLS )     buPROPion (WELLBUTRIN XL) 150 MG 24 hr tablet TAKE 1 TABLET EVERY MORNING     DULERA 200-5 MCG/ACT inhaler USE 2 INHALATIONS TWICE A DAY (DUE FOR A FOLLOW UP IN CLINIC, CALL [phone] TO SCHEDULE)     hydrochlorothiazide (HYDRODIURIL) 25 MG tablet TAKE 1 TABLET DAILY     levothyroxine (SYNTHROID/LEVOTHROID) 25 MCG tablet Take 1 tablet (25 mcg) by mouth daily     lisinopril (ZESTRIL) 30 MG tablet TAKE 1 TABLET DAILY     oxybutynin ER (DITROPAN-XL) 5 MG 24 hr tablet TAKE 1 TABLET DAILY     sertraline (ZOLOFT) 100 MG tablet TAKE 1 TABLET TWICE A DAY     tamsulosin (FLOMAX) 0.4 MG capsule TAKE 1 CAPSULE BY MOUTH EVERY DAY     valACYclovir (VALTREX) 1000 mg tablet Take 2 tablets (2,000 mg) by mouth 2 times daily     No current facility-administered medications for this visit.             Review of Systems:  "    Skin: negative  Eyes: negative  Ears/Nose/Throat: negative  Respiratory: No shortness of breath, dyspnea on exertion, cough, or hemoptysis  Cardiovascular: negative  Gastrointestinal: as above  Genitourinary: as above  Musculoskeletal: negative  Neurologic: negative  Psychiatric: negative  Hematologic/Lymphatic/Immunologic: negative  Endocrine: negative          Physical Exam:     PHYSICAL EXAM  Patient is a 72 year old  male   Vitals: Height 1.765 m (5' 9.49\"), weight 83 kg (183 lb).  Body mass index is 26.65 kg/m .  General Appearance Adult:   Alert, no acute distress, oriented  HENT: throat/mouth:normal, good dentition  Lungs: no respiratory distress, or pursed lip breathing  Heart: No obvious jugular venous distension present  Abdomen: non - distended  Musculoskeltal: extremities normal, no peripheral edema  Skin: no suspicious lesions or rashes  Neuro: Alert, oriented, speech and mentation normal  Psych: affect and mood normal  Gait: Normal           Data:   All laboratory data reviewed:    UA RESULTS:  Recent Labs   Lab Test 12/01/22  1157   COLOR Yellow   APPEARANCE Clear   URINEGLC Negative   URINEBILI Negative   URINEKETONE Negative   SG 1.015   UBLD Negative   URINEPH 5.5   PROTEIN Negative   UROBILINOGEN 0.2   NITRITE Negative   LEUKEST Negative   RBCU None Seen   WBCU None Seen      PSA   Date Value Ref Range Status   11/02/2020 2.84 0 - 4 ug/L Final     Comment:     Assay Method:  Chemiluminescence using Siemens Vista analyzer     Prostate Specific Antigen Screen   Date Value Ref Range Status   11/21/2022 1.23 0.00 - 6.50 ng/mL Final      Creatinine   Date Value Ref Range Status   12/01/2022 1.64 (H) 0.67 - 1.17 mg/dL Final   11/29/2022 1.67 (H) 0.67 - 1.17 mg/dL Final   11/21/2022 1.61 (H) 0.67 - 1.17 mg/dL Final   11/02/2020 1.00 0.66 - 1.25 mg/dL Final   07/11/2019 0.93 0.66 - 1.25 mg/dL Final     GFR Estimate   Date Value Ref Range Status   11/29/2022 43 (L) >60 mL/min/1.73m2 Final     Comment: "     Effective December 21, 2021 eGFRcr in adults is calculated using the 2021 CKD-EPI creatinine equation which includes age and gender (Shashi et al., NEJM, DOI: 10.1056/VIFJff2842116)   11/02/2020 76 >60 mL/min/[1.73_m2] Final     Comment:     Non  GFR Calc  Starting 12/18/2018, serum creatinine based estimated GFR (eGFR) will be   calculated using the Chronic Kidney Disease Epidemiology Collaboration   (CKD-EPI) equation.       GFR Estimate If Black   Date Value Ref Range Status   11/02/2020 88 >60 mL/min/[1.73_m2] Final     Comment:      GFR Calc  Starting 12/18/2018, serum creatinine based estimated GFR (eGFR) will be   calculated using the Chronic Kidney Disease Epidemiology Collaboration   (CKD-EPI) equation.           IMAGING:  All pertinent imaging reviewed:    All imaging studies reviewed by me.  I personally reviewed these imaging films.  A formal report from radiology will follow.    CT ABD/PEL 12/2/2022:  FINDINGS:   LOWER CHEST: Unremarkable.     HEPATOBILIARY: Normal.     PANCREAS: Atrophic but otherwise unremarkable.     SPLEEN: Normal.     ADRENAL GLANDS: Normal.     KIDNEYS/BLADDER: Moderate left hydronephrosis to the level of the ureteropelvic junction. Likely crossing vessel at this level. The left ureter is completely decompressed. No nephroureterolithiasis or hydronephrosis. Urinary bladder is unremarkable.     BOWEL: Large volume of formed stool throughout the colon. No evidence of obstruction or acute inflammation. Right inguinal hernia contains a nonobstructed loop of distal ileum without evidence of complication     LYMPH NODES: Normal.     VASCULATURE: Moderate calcified atherosclerosis.     PELVIC ORGANS: Prostatomegaly with likely prior TURP.     MUSCULOSKELETAL: No acute bony abnormality. Moderate to severe degenerative changes in the right hip with bone-on-bone articulation subchondral cyst formation.                                                       IMPRESSION:   1.  Moderate left hydronephrosis, most likely secondary to ureteropelvic junction obstruction but consider urology referral and CT urogram for further evaluation.  2.  No nephroureterolithiasis.  3.  Likely constipation.         Impression and Plan:   Impression:   73-year-old man with newly diagnosed CKD stage IIIb with evidence of left-sided hydronephrosis      Plan:   Left-sided hydronephrosis  - I reviewed his labs which are notable for a serum creatinine of 1.0 in November 2020.  No labs for the last 2 years and his serum creatinine this year at 1.61 which has been stable over the last month to 1.64  - I reviewed his imaging and reviewed his recent CT scan from 12/2/2022 with evidence of a left-sided UPJ obstruction with a crossing lower pole vessel.  He is asymptomatic from this with no flank pain  - I reviewed outside imaging reports through care everywhere including a CT scan from June 2022 which comments on moderate left-sided hydronephrosis likely secondary to UPJ obstruction  - He has not been previously aware of any hydronephrosis  - We discussed the need for further work-up with a baseline nuclear medicine renal scan in order for this was placed  - We discussed the need for an operative cystoscopy, left retrograde pyelogram, left ureteral stent placement and we will plan for this at [facility] next week  - We will then plan to keep the stent in place for 4 to 6 weeks with repeat labs and imaging to assess for any change in renal function and creatinine    CKD stage IIIb  - It is unclear if this is related to his left-sided hydronephrosis as he has likely had this for several years  - I would recommend a referral to nephrology for further evaluation     Thank you for the kind consultation.    Time spent: 20 minutes spent on the date of the encounter doing chart review, history and exam, documentation and further activities as noted above.    Wisam Eugene MD    Urology  [facility]  [facility] Phone: [phone]  [facility] Phone: [phone]     PATIENT WILL MEET YOU IN MY CHART.  Andres is a 72 year old who is being evaluated via a billable video visit.      How would you like to obtain your AVS? MyChart  If the video visit is dropped, the invitation should be resent by: Text to cell phone: [phone]  Will anyone else be joining your video visit? Yes: WILL BE AT APPT WITH . How would they like to receive their invitation? Text to cell phone: [phone]      Video-Visit Details    Video Start Time: 10:50 AM    Type of service:  Video Visit    Video End Time:11:10 AM    Originating Location (pt. Location): Home        Distant Location (provider location):  On-site    Platform used for Video Visit: OVIA

## 2022-12-06 NOTE — Clinical Note
Tavares Fonseca,  I saw Rich today in consultation for his left-sided hydronephrosis and CKD stage IIIb.  His imaging is consistent with a left-sided UPJ obstruction which she has likely had for several years.  Its unclear if this is the cause of his change in serum creatinine, but I will plan for a baseline nuclear medicine renal scan followed by a cystoscopy and ureteral stent placement to see if this improves his serum creatinine.  I do think it would be reasonable for him to establish with a nephrologist in case this is not related to a postrenal issue.  Thanks,  Wisam Eugene M.D. Cell: [phone]

## 2022-12-06 NOTE — LETTER
12/6/2022       RE: Dionte Mackey  [address]  [place] [address]     Dear Colleague,    Thank you for referring your patient, Dionte Mackey, to the [facility] [place] at [facility]. Please see a copy of my visit note below.    Urology Consult History and Physical  [facility]   Name: Dionte Mackey    MRN: [id number]   YOB: 1950       We were asked to see Dionte Mackey at the request of Dr. Fonseca for evaluation and treatment of LEFT hydronephrosis, ARIC.        Chief Complaint:   LEFT hydronephrosis    History is obtained from the patient and medical record            History of Present Illness:   Dionte Mackey is a 72 year old male who is being seen for evaluation of left-sided hydronephrosis with newly diagnosed CKD    Planning for hernia surgery, he has a right inguinal hernia  Pre-op visit with abnormal labs noting a elevated serum creatinine  Follow-up imaging was obtained with left-sided hydronephrosis  He does feel he empties to completion and is overall not very bothered by his LUTS  He is on tamsulosin 0.4 mg daily and oxybutynin 5 mg daily    Previously saw Dr. Barrios  2011 - TURP and cystolitholapaxy   2015 - TURP             Past Medical History:     Past Medical History:   Diagnosis Date     Arthritis      BPH (benign prostatic hyperplasia)      Cryptogenic stroke (H) 08/16/2022     Depression      Depressive disorder 2005     History of spinal cord injury      Hypertension      Mumps      Non morbid obesity due to excess calories 11/09/2016     Recurrent major depressive disorder, in full remission (H) 11/09/2016     Uncomplicated asthma             Past Surgical History:     Past Surgical History:   Procedure Laterality Date     BACK SURGERY      cervical fusion,neck fx repair     COLONOSCOPY       CYSTOSCOPY       CYSTOSCOPY, TRANSURETHRAL RESECTION (TUR) PROSTATE, COMBINED N/A 04/03/2015    Procedure:  COMBINED CYSTOSCOPY, TRANSURETHRAL RESECTION (TUR) PROSTATE;  Surgeon: Bakari Barrios MD;  Location: [facility] OR     ENT SURGERY      SINUS SURGERY X 3,SEPTOPLASTY,TONSILLECTOMY     EYE SURGERY  11/10/2017     FOOT SURGERY Right      GENITOURINARY SURGERY      TURP IN 2010     HEAD & NECK SURGERY  10/15/2017     PROSTATE SURGERY       TONSILLECTOMY              Social History:     Social History     Tobacco Use     Smoking status: Never     Smokeless tobacco: Never   Substance Use Topics     Alcohol use: Yes     Alcohol/week: 3.0 - 4.0 standard drinks     Comment: 3 drinks on weekend days       History   Smoking Status     Never   Smokeless Tobacco     Never            Family History:     Family History   Problem Relation Age of Onset     Colon Cancer Father 70     Coronary Artery Disease No family hx of      Cerebrovascular Disease No family hx of      Diabetes No family hx of               Allergies:     Allergies   Allergen Reactions     Ciprofloxacin Shortness Of Breath, Itching and Difficulty breathing     Moxifloxacin Hives            Medications:     Current Outpatient Medications   Medication Sig     albuterol (PROAIR HFA/PROVENTIL HFA/VENTOLIN HFA) 108 (90 Base) MCG/ACT inhaler USE 2 INHALATIONS EVERY 6 HOURS     aspirin (ASA) 81 MG EC tablet Take 1 tablet (81 mg) by mouth daily     atorvastatin (LIPITOR) 20 MG tablet TAKE 1 TABLET DAILY (SCHEDULE APPOINTMENT TO DISCUSS FOR FURTHER REFILLS )     buPROPion (WELLBUTRIN XL) 150 MG 24 hr tablet TAKE 1 TABLET EVERY MORNING     DULERA 200-5 MCG/ACT inhaler USE 2 INHALATIONS TWICE A DAY (DUE FOR A FOLLOW UP IN CLINIC, CALL [phone] TO SCHEDULE)     hydrochlorothiazide (HYDRODIURIL) 25 MG tablet TAKE 1 TABLET DAILY     levothyroxine (SYNTHROID/LEVOTHROID) 25 MCG tablet Take 1 tablet (25 mcg) by mouth daily     lisinopril (ZESTRIL) 30 MG tablet TAKE 1 TABLET DAILY     oxybutynin ER (DITROPAN-XL) 5 MG 24 hr tablet TAKE 1 TABLET DAILY     sertraline  "(ZOLOFT) 100 MG tablet TAKE 1 TABLET TWICE A DAY     tamsulosin (FLOMAX) 0.4 MG capsule TAKE 1 CAPSULE BY MOUTH EVERY DAY     valACYclovir (VALTREX) 1000 mg tablet Take 2 tablets (2,000 mg) by mouth 2 times daily     No current facility-administered medications for this visit.             Review of Systems:     Skin: negative  Eyes: negative  Ears/Nose/Throat: negative  Respiratory: No shortness of breath, dyspnea on exertion, cough, or hemoptysis  Cardiovascular: negative  Gastrointestinal: as above  Genitourinary: as above  Musculoskeletal: negative  Neurologic: negative  Psychiatric: negative  Hematologic/Lymphatic/Immunologic: negative  Endocrine: negative          Physical Exam:     PHYSICAL EXAM  Patient is a 72 year old  male   Vitals: Height 1.765 m (5' 9.49\"), weight 83 kg (183 lb).  Body mass index is 26.65 kg/m .  General Appearance Adult:   Alert, no acute distress, oriented  HENT: throat/mouth:normal, good dentition  Lungs: no respiratory distress, or pursed lip breathing  Heart: No obvious jugular venous distension present  Abdomen: non - distended  Musculoskeltal: extremities normal, no peripheral edema  Skin: no suspicious lesions or rashes  Neuro: Alert, oriented, speech and mentation normal  Psych: affect and mood normal  Gait: Normal           Data:   All laboratory data reviewed:    UA RESULTS:  Recent Labs   Lab Test 12/01/22  1157   COLOR Yellow   APPEARANCE Clear   URINEGLC Negative   URINEBILI Negative   URINEKETONE Negative   SG 1.015   UBLD Negative   URINEPH 5.5   PROTEIN Negative   UROBILINOGEN 0.2   NITRITE Negative   LEUKEST Negative   RBCU None Seen   WBCU None Seen      PSA   Date Value Ref Range Status   11/02/2020 2.84 0 - 4 ug/L Final     Comment:     Assay Method:  Chemiluminescence using Siemens Vista analyzer     Prostate Specific Antigen Screen   Date Value Ref Range Status   11/21/2022 1.23 0.00 - 6.50 ng/mL Final      Creatinine   Date Value Ref Range Status   12/01/2022 1.64 " (H) 0.67 - 1.17 mg/dL Final   11/29/2022 1.67 (H) 0.67 - 1.17 mg/dL Final   11/21/2022 1.61 (H) 0.67 - 1.17 mg/dL Final   11/02/2020 1.00 0.66 - 1.25 mg/dL Final   07/11/2019 0.93 0.66 - 1.25 mg/dL Final     GFR Estimate   Date Value Ref Range Status   11/29/2022 43 (L) >60 mL/min/1.73m2 Final     Comment:     Effective December 21, 2021 eGFRcr in adults is calculated using the 2021 CKD-EPI creatinine equation which includes age and gender (Shashi et al., NEJ, DOI: 10.1056/UYQQml2971079)   11/02/2020 76 >60 mL/min/[1.73_m2] Final     Comment:     Non  GFR Calc  Starting 12/18/2018, serum creatinine based estimated GFR (eGFR) will be   calculated using the Chronic Kidney Disease Epidemiology Collaboration   (CKD-EPI) equation.       GFR Estimate If Black   Date Value Ref Range Status   11/02/2020 88 >60 mL/min/[1.73_m2] Final     Comment:      GFR Calc  Starting 12/18/2018, serum creatinine based estimated GFR (eGFR) will be   calculated using the Chronic Kidney Disease Epidemiology Collaboration   (CKD-EPI) equation.           IMAGING:  All pertinent imaging reviewed:    All imaging studies reviewed by me.  I personally reviewed these imaging films.  A formal report from radiology will follow.    CT ABD/PEL 12/2/2022:  FINDINGS:   LOWER CHEST: Unremarkable.     HEPATOBILIARY: Normal.     PANCREAS: Atrophic but otherwise unremarkable.     SPLEEN: Normal.     ADRENAL GLANDS: Normal.     KIDNEYS/BLADDER: Moderate left hydronephrosis to the level of the ureteropelvic junction. Likely crossing vessel at this level. The left ureter is completely decompressed. No nephroureterolithiasis or hydronephrosis. Urinary bladder is unremarkable.     BOWEL: Large volume of formed stool throughout the colon. No evidence of obstruction or acute inflammation. Right inguinal hernia contains a nonobstructed loop of distal ileum without evidence of complication     LYMPH NODES: Normal.     VASCULATURE:  Moderate calcified atherosclerosis.     PELVIC ORGANS: Prostatomegaly with likely prior TURP.     MUSCULOSKELETAL: No acute bony abnormality. Moderate to severe degenerative changes in the right hip with bone-on-bone articulation subchondral cyst formation.                                                      IMPRESSION:   1.  Moderate left hydronephrosis, most likely secondary to ureteropelvic junction obstruction but consider urology referral and CT urogram for further evaluation.  2.  No nephroureterolithiasis.  3.  Likely constipation.         Impression and Plan:   Impression:   73-year-old man with newly diagnosed CKD stage IIIb with evidence of left-sided hydronephrosis      Plan:   Left-sided hydronephrosis  - I reviewed his labs which are notable for a serum creatinine of 1.0 in November 2020.  No labs for the last 2 years and his serum creatinine this year at 1.61 which has been stable over the last month to 1.64  - I reviewed his imaging and reviewed his recent CT scan from 12/2/2022 with evidence of a left-sided UPJ obstruction with a crossing lower pole vessel.  He is asymptomatic from this with no flank pain  - I reviewed outside imaging reports through care everywhere including a CT scan from June 2022 which comments on moderate left-sided hydronephrosis likely secondary to UPJ obstruction  - He has not been previously aware of any hydronephrosis  - We discussed the need for further work-up with a baseline nuclear medicine renal scan in order for this was placed  - We discussed the need for an operative cystoscopy, left retrograde pyelogram, left ureteral stent placement and we will plan for this at [facility] next week  - We will then plan to keep the stent in place for 4 to 6 weeks with repeat labs and imaging to assess for any change in renal function and creatinine    CKD stage IIIb  - It is unclear if this is related to his left-sided hydronephrosis as he has likely had this for several  years  - I would recommend a referral to nephrology for further evaluation     Thank you for the kind consultation.    Time spent: 20 minutes spent on the date of the encounter doing chart review, history and exam, documentation and further activities as noted above.    Wisam Eugene MD   Urology  [facility]  [facility] Phone: [phone]  [facility] Phone: [phone]     PATIENT WILL MEET YOU IN MY CHART.  Andres is a 72 year old who is being evaluated via a billable video visit.      How would you like to obtain your AVS? MyChart  If the video visit is dropped, the invitation should be resent by: Text to cell phone: [phone]  Will anyone else be joining your video visit? Yes: WILL BE AT APPT WITH . How would they like to receive their invitation? Text to cell phone: [phone]    Video-Visit Details  Video Start Time: 10:50 AM  Type of service:  Video Visit  Video End Time:11:10 AM  Originating Location (pt. Location): Home  Distant Location (provider location):  On-site  Platform used for Video Visit: MessageOne

## 2022-12-08 ENCOUNTER — TELEPHONE (OUTPATIENT)
Dept: NEPHROLOGY | Facility: CLINIC | Age: 72
End: 2022-12-08

## 2022-12-08 DIAGNOSIS — N13.30 HYDRONEPHROSIS: Primary | ICD-10-CM

## 2022-12-08 DIAGNOSIS — N40.1 BENIGN LOCALIZED PROSTATIC HYPERPLASIA WITH LOWER URINARY TRACT SYMPTOMS (LUTS): Primary | ICD-10-CM

## 2022-12-08 DIAGNOSIS — E55.9 VITAMIN D DEFICIENCY, UNSPECIFIED: ICD-10-CM

## 2022-12-08 DIAGNOSIS — I10 BENIGN ESSENTIAL HYPERTENSION: Primary | ICD-10-CM

## 2022-12-08 RX ORDER — TAMSULOSIN HYDROCHLORIDE 0.4 MG/1
0.4 CAPSULE ORAL DAILY
Qty: 30 CAPSULE | Refills: 4 | Status: SHIPPED | OUTPATIENT
Start: 2022-12-08 | End: 2023-03-08

## 2022-12-08 NOTE — TELEPHONE ENCOUNTER
Per chart review, nephrology referral sent today 12/8/22 by Dr. Eugene.    Sent patient Traction message to notify and provided nephrology phone number to call at schedule at [phone].    Georgina Ma RN  [facility]

## 2022-12-08 NOTE — PROGRESS NOTES
Nephrology Clinic Visit  Dionte Mackey MRN: [id number] YOB: 1950  Primary Care Provider: Romeo Fonseca  History Obtained From: Patient  Spouse - Wife  External Document Review: Primary Care Provider    Pertinent Nephro History:  CKD G3bA1 (BL: ?1.6?)  2/2 Obstructive Nephropathy, HTN  No renal biopsy  CT Abd/Pelvis 12/2022: Moderate L [place] w/ likely crossing vessel, no stones, no masses  Tamsulosin, Atorvastatin, Lisinopril, HCTZ  -------------------------------------------------------------------------------------------------------------------------------  Visit 12/14/2022:  -Here to establish care with U to[facility] 12/14/2022  -Hx of TURP and L sided hydro/Urology recommendations: Saw urology 12/6/2022 and 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 Plan to undergo NM renal Scan and plan for operative cystoscopy, L retrograde pyelogram, L ureteral stent placement for 4-6 weeks.  -BP control: BP in office 138/77.  -NSAID use: Aleve occasionally - about 1-2 doses a week.    -Herbal/OTC medications: No  -Family history of kidney disease: No  -Hx of kidney stones: Brother with kidney stone, he hasn't had any  -Small Monoclonal protein on SPEP. None on UPEP. Very minimal UPCR.    Objective:  PAST MEDICAL HISTORY:  Past Medical History:   Diagnosis Date     Arthritis      BPH (benign prostatic hyperplasia)      Cryptogenic stroke (H) 08/16/2022     Depression      Depressive disorder 2005     History of spinal cord injury      Hypertension      Mumps      Non morbid obesity due to excess calories 11/09/2016     Recurrent major depressive disorder, in full remission (H) 11/09/2016     Uncomplicated asthma        PAST SURGICAL HISTORY:  Past Surgical History:   Procedure Laterality Date     BACK SURGERY      cervical fusion,neck fx repair     COLONOSCOPY       CYSTOSCOPY       CYSTOSCOPY, TRANSURETHRAL RESECTION (TUR) PROSTATE, COMBINED N/A 04/03/2015    Procedure: COMBINED CYSTOSCOPY, TRANSURETHRAL RESECTION  (TUR) PROSTATE;  Surgeon: Bakari Barrios MD;  Location: [facility]     ENT SURGERY      SINUS SURGERY X 3,SEPTOPLASTY,TONSILLECTOMY     EYE SURGERY  11/10/2017     FOOT SURGERY Right      GENITOURINARY SURGERY      TURP IN 2010     HEAD & NECK SURGERY  10/15/2017     PROSTATE SURGERY       TONSILLECTOMY         MEDICATIONS:  Current Outpatient Medications   Medication Instructions     albuterol (PROAIR HFA/PROVENTIL HFA/VENTOLIN HFA) 108 (90 Base) MCG/ACT inhaler USE 2 INHALATIONS EVERY 6 HOURS     aspirin (ASA) 81 mg, Oral, DAILY     atorvastatin (LIPITOR) 20 MG tablet TAKE 1 TABLET DAILY (SCHEDULE APPOINTMENT TO DISCUSS FOR FURTHER REFILLS )     buPROPion (WELLBUTRIN XL) 150 MG 24 hr tablet TAKE 1 TABLET EVERY MORNING     DULERA 200-5 MCG/ACT inhaler USE 2 INHALATIONS TWICE A DAY (DUE FOR A FOLLOW UP IN CLINIC, CALL [phone] TO SCHEDULE)     hydrochlorothiazide (HYDRODIURIL) 25 MG tablet TAKE 1 TABLET DAILY     levothyroxine (SYNTHROID/LEVOTHROID) 25 mcg, Oral, DAILY     lisinopril (ZESTRIL) 30 MG tablet TAKE 1 TABLET DAILY     oxybutynin ER (DITROPAN-XL) 5 MG 24 hr tablet TAKE 1 TABLET DAILY     sertraline (ZOLOFT) 100 MG tablet TAKE 1 TABLET TWICE A DAY     tamsulosin (FLOMAX) 0.4 MG capsule TAKE 1 CAPSULE BY MOUTH EVERY DAY     tamsulosin (FLOMAX) 0.4 mg, Oral, DAILY     valACYclovir (VALTREX) 2,000 mg, Oral, 2 TIMES DAILY       FAMILY MEDICAL HISTORY:   Family History   Problem Relation Age of Onset     Colon Cancer Father 70     Coronary Artery Disease No family hx of      Cerebrovascular Disease No family hx of      Diabetes No family hx of        PHYSICAL EXAM:   /77   Pulse 87   Temp 98.4  F (36.9  C)   Wt 87 kg (191 lb 14.4 oz)   SpO2 96%   BMI 27.94 kg/m    GENERAL APPEARANCE: alert and no distress  EYES: nonicteric  HENT: mouth without ulcers or lesions  RESP: lungs clear to auscultation   CV: regular rhythm, normal rate, no rub  ABDOMEN: soft, nontender, normal bowel sounds, no  HSM   Extremities: no clubbing, cyanosis, or edema  MS: no evidence of inflammation in joints, no muscle tenderness  SKIN: no rash  NEURO: mentation intact and speech normal  PSYCH: affect normal    LABS REVIEWED BY ME:   ANEMIA  Recent Labs   Lab Test 11/21/22  1555 11/02/20  0933 07/11/19  1142 06/01/18  0843   HGB 13.5 14.7 15.2 14.6       BMP  Recent Labs   Lab Test 12/01/22  1157 11/29/22  1349 11/21/22  1555 07/27/22  1330 11/02/20  0933 07/11/19  1142 06/01/18  0843 09/13/17  0921     --  138 134 135 137 138 136   POTASSIUM  --   --  4.7  --  4.5 4.4 4.4 4.1   CHLORIDE  --   --   --   --  105 104 105 105   CO2  --   --   --   --  26 26 27 24   ANIONGAP  --   --   --   --  4 7 6 7   BUN  --   --  20.0  --  20 16 18 17   CR 1.64* 1.67* 1.61*  --  1.00 0.93 1.12 0.91   GFRESTIMATED  --  43* 45*  --  76 84 65 83   PROTTOTAL  --   --   --   --  7.3 7.5 7.3 7.3       CBC  Recent Labs   Lab Test 11/21/22  1555 11/02/20  0933 07/11/19  1142 06/01/18  0843 09/13/17  0921   HGB 13.5 14.7 15.2 14.6 14.0   WBC  --  8.6 11.4* 9.0 5.6   HCT  --  43.4 44.1 43.5 40.3   MCV  --  92 91 91 89   PLT  --  211 317 283 226       DIABETES  Recent Labs   Lab Test 11/21/22  1555 08/16/22  1050   A1C 5.8* 5.9*       HYPONATREMIA  Recent Labs   Lab Test 12/01/22  1157   UNAR 64       MBD  Recent Labs   Lab Test 11/02/20  0933 07/11/19  1142 06/01/18  0843 09/13/17  0921   KESHAV 9.5 9.0 9.1 9.0   ALBUMIN 4.0 4.1 4.0 3.9        URINE STUDIES  Recent Labs   Lab Test 12/01/22  1157   COLOR Yellow   APPEARANCE Clear   URINEGLC Negative   URINEBILI Negative   URINEKETONE Negative   SG 1.015   UBLD Negative   URINEPH 5.5   PROTEIN Negative   UROBILINOGEN 0.2   NITRITE Negative   LEUKEST Negative   RBCU None Seen   WBCU None Seen     No lab results found.    ADDITIONAL LABS ORDERED/REVIEWED BY ME:  None    Assessment/Plan  CKD Stage G3bA1  Established care with [facility] 12/14/2022    Creatinine in the 0.9-1.1 range until 11/2022 when  "creatinine found to be 1.6. Known L UPJ obstruction likely contributing. UA 12/2022 w/o hematuria or pyuria. UPCR 0.18g/g. SPEP 12/2022 w/ \"a few very small globulin bands seen. We cannot totally rule out that one of the globulin bansds is an immunoglobin or free light chain monoclonal\". UPEP 12/2022 was negative. No history of hypercalcemia, anemia, or atypical bone pain (has hip pain due to \"bone on bone\"). Renogram done 12/2022 w/ results pending at the time of his appointment with me. Urology planning for L ureteral stent placement 12/16/2022.    CKD Etiology (Biopsy Proven: No):  -Obstructive Nephropathy  -Hypertensive Nephrosclerosis  -?MGRS (very unlikely at this point as SPEP findings 12/2022 with very small underlying monoclonal process, Negative UPEP, essentially negative UPCR. 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  ---If renal function not improving following NM renal Scan and operative cystoscopy, L retrograde pyelogram, L ureteral stent placement we will need to consider renal biopsy to evaluate for MGRS. Again, at this point, it is felt that this is quite unlikely.  -Strict BP control. He will send me his home readings in 3 weeks (checking 3x per week) but it seems like his home readings are likely adequately controlled. Continue Lisinopril and hydrochlorothiazide at current doses  -Avoid nephrotoxic agents (Advised on reducing NSAID intake)      Anemia of Renal Disease  Hemoglobin: 13.5  Ferritin: N/A  TSAT: N/A    Plan:  -No need for EPO at this time      Lipid Management in CKD  KDIGO 2013 Guidelines recommend the following for patients with CKD:  Adults >/= 51 yo w/ CKD stage 1 or 2: Statin  Adults >/= 51 yo w/ CKD stage 3-5: Statin + Ezetimibe or Statin alone  Adults 18-49 + 1 of the following (CAD, DM, " Ischemic stroke, 10 yr ASCVD risk >10%): Statin    KDIGO guidelines recommend Simvastatin 20mg/Ezetimibe 10mg qDay for patients with CKD G3a-G5 (in line with the SHARP trial). If Simvastatin used alone, 40mg qDay is referenced.   Other options include: Atorvastatin 20mg qDay (4D trial) and Rosuvastatin 10mg qDay (CIELO trial)    Plan:  -Agree with Atorvastatin      Metabolic Acidosis of Renal Disease  Bicarb: 22    Plan:  -No need for NaHCO at this ttime      Mineral Bone Disease  PTH: 64  Phos: 3.1  Calcium: 9.1    Plan:  -No need for phos binder at this time    Return to clinic: 2 months (if they are still in [place], they may leave for [place]. If that is the case, will try to line up appt with Urology follow up)    Jhonny Bhatti MD   of Medicine  Division of Nephrology and Hypertension  [facility]    45 minutes spent on the date of the encounter doing chart review, history and exam, documentation and further activities as noted above

## 2022-12-08 NOTE — TELEPHONE ENCOUNTER
[facility] Call [facility]    Phone Message    May a detailed message be left on voicemail: yes     Reason for Call: Order(s): Other: Lab Orders  Reason for requested: 12/13 Lab Appt  Date needed: Before 12/13  Provider name: Davy    Patient needs orders placed for lab appointment on 12/13 for 12/14 appointment with Dr. Bhatti. Thank you!    Also, patient has a test schedule right before that he is concerned may interfere with labs. Please review and call patient back to let him know if no concern, or if he should reschedule for prior to test that day at 2:00 pm.   If you are not able to reach patient at number in chart, please try his wife at [phone]. Thank you!    Action Taken: Message routed to:  [facility] ([facility]): Nephrology    Travel Screening: Not Applicable

## 2022-12-09 ENCOUNTER — DOCUMENTATION ONLY (OUTPATIENT)
Dept: LAB | Facility: CLINIC | Age: 72
End: 2022-12-09

## 2022-12-09 NOTE — TELEPHONE ENCOUNTER
Call to patient and informed him that he should still get labs drawn before seeing Dr Bhatti as some labs are still needed.  Patient verbalized understanding and will get done  Marisol Elkins LPN  Nephrology  [phone]

## 2022-12-09 NOTE — TELEPHONE ENCOUNTER
DIAGNOSIS:  Hydronephrosis   DATE RECEIVED: 12.14.2022   NOTES STATUS DETAILS   OFFICE NOTE from referring provider Internal 12.08.2022 Wisam Eugene MD   OFFICE NOTE from other specialist      *Only VASCULITIS or LUPUS gather office notes for the following     *PULMONARY       *ENT     *DERMATOLOGY     *RHEUMATOLOGY     DISCHARGE SUMMARY from hospital     DISCHARGE REPORT from the ER     MEDICATION LIST Internal    IMAGING  (NEED IMAGES AND REPORTS)     KIDNEY CT SCAN Internal 12.02.2022 CT ABDOMEN PELVIS W/O CONTRAST   KIDNEY ULTRASOUND Internal 12.02.2022 US RENAL COMPLETE NON-VASCULAR    MR ABDOMEN     NUCLEAR MEDICINE RENAL     LABS     CBC Internal 11.02.2020   CMP Internal 11.02.2020   BMP     UA Internal 12.01.2022   URINE PROTEIN Internal 12.03.2022   RENAL PANEL     BIOPSY     KIDNEY BIOPSY

## 2022-12-09 NOTE — PROGRESS NOTES
Dionte BROWER Saline has an upcoming lab appointment:    Future Appointments   Date Time Provider Department [place]   12/13/2022  2:00 PM [facility]   12/13/2022  3:15 PM [facility]    12/14/2022  9:30 AM Jhonny Bhatti MD [facility]   12/26/2022  7:45 AM [facility]    12/29/2022  3:00 PM Romeo Fonseca MD [facility]     Patient is scheduled for the following lab(s): pt is requesting labs prior to appointment. Please order if necessary, thank you    There is no order available. Please review and place either future orders or HMPO (Review of Health Maintenance Protocol Orders), as appropriate.    There are no preventive care reminders to display for this patient.  Aixa Morales

## 2022-12-12 ENCOUNTER — LAB (OUTPATIENT)
Dept: LAB | Facility: CLINIC | Age: 72
End: 2022-12-12
Payer: COMMERCIAL

## 2022-12-12 DIAGNOSIS — I10 BENIGN ESSENTIAL HYPERTENSION: ICD-10-CM

## 2022-12-12 DIAGNOSIS — D80.8 LIGHT CHAIN DISEASE (H): ICD-10-CM

## 2022-12-12 DIAGNOSIS — E55.9 VITAMIN D DEFICIENCY, UNSPECIFIED: ICD-10-CM

## 2022-12-12 LAB
ALBUMIN MFR UR ELPH: 8.8 MG/DL
ALBUMIN SERPL BCG-MCNC: 4.3 G/DL (ref 3.5–5.2)
ALBUMIN UR-MCNC: NEGATIVE MG/DL
ANION GAP SERPL CALCULATED.3IONS-SCNC: 11 MMOL/L (ref 7–15)
APPEARANCE UR: CLEAR
BACTERIA #/AREA URNS HPF: ABNORMAL /HPF
BILIRUB UR QL STRIP: NEGATIVE
BUN SERPL-MCNC: 22.8 MG/DL (ref 8–23)
CALCIUM SERPL-MCNC: 9.1 MG/DL (ref 8.8–10.2)
CHLORIDE SERPL-SCNC: 102 MMOL/L (ref 98–107)
COLOR UR AUTO: YELLOW
CREAT SERPL-MCNC: 1.65 MG/DL (ref 0.67–1.17)
CREAT UR-MCNC: 47.8 MG/DL
DEPRECATED CALCIDIOL+CALCIFEROL SERPL-MC: 21 UG/L (ref 20–75)
DEPRECATED HCO3 PLAS-SCNC: 22 MMOL/L (ref 22–29)
GFR SERPL CREATININE-BSD FRML MDRD: 44 ML/MIN/1.73M2
GLUCOSE SERPL-MCNC: 256 MG/DL (ref 70–99)
GLUCOSE UR STRIP-MCNC: 250 MG/DL
HGB UR QL STRIP: NEGATIVE
KETONES UR STRIP-MCNC: NEGATIVE MG/DL
LEUKOCYTE ESTERASE UR QL STRIP: NEGATIVE
NITRATE UR QL: NEGATIVE
PH UR STRIP: 5.5 [PH] (ref 5–7)
PHOSPHATE SERPL-MCNC: 3.1 MG/DL (ref 2.5–4.5)
POTASSIUM SERPL-SCNC: 4.8 MMOL/L (ref 3.4–5.3)
PROT/CREAT 24H UR: 0.18 MG/MG CR (ref 0–0.2)
PTH-INTACT SERPL-MCNC: 64 PG/ML (ref 15–65)
RBC #/AREA URNS AUTO: ABNORMAL /HPF
SODIUM SERPL-SCNC: 135 MMOL/L (ref 136–145)
SP GR UR STRIP: 1.01 (ref 1–1.03)
SQUAMOUS #/AREA URNS AUTO: ABNORMAL /LPF
UROBILINOGEN UR STRIP-ACNC: 0.2 E.U./DL
WBC #/AREA URNS AUTO: ABNORMAL /HPF

## 2022-12-12 PROCEDURE — 86335 IMMUNFIX E-PHORSIS/URINE/CSF: CPT

## 2022-12-12 PROCEDURE — 86334 IMMUNOFIX E-PHORESIS SERUM: CPT

## 2022-12-12 PROCEDURE — 83970 ASSAY OF PARATHORMONE: CPT

## 2022-12-12 PROCEDURE — 80069 RENAL FUNCTION PANEL: CPT

## 2022-12-12 PROCEDURE — 84156 ASSAY OF PROTEIN URINE: CPT

## 2022-12-12 PROCEDURE — 36415 COLL VENOUS BLD VENIPUNCTURE: CPT

## 2022-12-12 PROCEDURE — 82306 VITAMIN D 25 HYDROXY: CPT

## 2022-12-12 PROCEDURE — 81001 URINALYSIS AUTO W/SCOPE: CPT

## 2022-12-13 ENCOUNTER — HOSPITAL ENCOUNTER (OUTPATIENT)
Dept: NUCLEAR MEDICINE | Facility: CLINIC | Age: 72
Setting detail: NUCLEAR MEDICINE
Discharge: HOME | End: 2022-12-13
Attending: UROLOGY | Admitting: UROLOGY
Payer: COMMERCIAL

## 2022-12-13 DIAGNOSIS — N13.30 HYDRONEPHROSIS, UNSPECIFIED HYDRONEPHROSIS TYPE: ICD-10-CM

## 2022-12-13 DIAGNOSIS — N13.5 UPJ (URETEROPELVIC JUNCTION) OBSTRUCTION: ICD-10-CM

## 2022-12-13 DIAGNOSIS — N17.9 AKI (ACUTE KIDNEY INJURY) (H): ICD-10-CM

## 2022-12-13 LAB
PROT ELPH PNL UR ELPH: NORMAL
PROT PATTERN SERPL IFE-IMP: NORMAL

## 2022-12-13 PROCEDURE — 78708 K FLOW/FUNCT IMAGE W/DRUG: CPT

## 2022-12-13 PROCEDURE — 343N000001 HC RX 343: Performed by: UROLOGY

## 2022-12-13 PROCEDURE — 250N000011 HC RX IP 250 OP 636: Performed by: UROLOGY

## 2022-12-13 PROCEDURE — A9562 TC99M MERTIATIDE: HCPCS | Performed by: UROLOGY

## 2022-12-13 RX ORDER — FUROSEMIDE 10 MG/ML
40 INJECTION INTRAMUSCULAR; INTRAVENOUS ONCE
Status: COMPLETED | OUTPATIENT
Start: 2022-12-13 | End: 2022-12-13

## 2022-12-13 RX ADMIN — FUROSEMIDE 40 MG: 10 INJECTION, SOLUTION INTRAMUSCULAR; INTRAVENOUS at 14:18

## 2022-12-13 RX ADMIN — TECHNESCAN TC 99M MERTIATIDE 8.7 MILLICURIE: 1 INJECTION, POWDER, LYOPHILIZED, FOR SOLUTION INTRAVENOUS at 13:59

## 2022-12-13 NOTE — RESULT ENCOUNTER NOTE
The following letter pertains to your most recent diagnostic tests:    The immunofixation test shows the presence of a very small amount of abnormal protein in the blood.  These abnormal proteins are a potential, but very rare cause for worsening kidney function.    I think it remains to be determined if they are the cause for your worsening kidney function.  I think the nephrologist (Dr. Bhatti) has to incorporate this finding along with the information from Dr. Eugene's tests and other blood work to determine the precise cause of your worsening kidney function.      That being said, the abnormal proteins still require monitoring over time and sometimes require input from a blood specialist (hematologist).  However, in an effort to try to simplify an increasingly complex picture, I think the first priority is to sort out your kidney problem and then we can worry about the small amount of abnormal proteins in the blood stream once we are confident that the kidney problems is stabilized.      I believe we have an appointment at the end of December and we can discuss this matter further at that time.        Sincerely,    Dr. Fonseca

## 2022-12-14 ENCOUNTER — OFFICE VISIT (OUTPATIENT)
Dept: NEPHROLOGY | Facility: CLINIC | Age: 72
End: 2022-12-14
Attending: STUDENT IN AN ORGANIZED HEALTH CARE EDUCATION/TRAINING PROGRAM
Payer: COMMERCIAL

## 2022-12-14 ENCOUNTER — PRE VISIT (OUTPATIENT)
Dept: NEPHROLOGY | Facility: CLINIC | Age: 72
End: 2022-12-14

## 2022-12-14 VITALS
SYSTOLIC BLOOD PRESSURE: 138 MMHG | OXYGEN SATURATION: 96 % | HEART RATE: 87 BPM | DIASTOLIC BLOOD PRESSURE: 77 MMHG | WEIGHT: 191.9 LBS | BODY MASS INDEX: 27.94 KG/M2 | TEMPERATURE: 98.4 F

## 2022-12-14 DIAGNOSIS — Q62.11 HYDRONEPHROSIS WITH URETEROPELVIC JUNCTION (UPJ) OBSTRUCTION: ICD-10-CM

## 2022-12-14 DIAGNOSIS — I10 BENIGN ESSENTIAL HYPERTENSION: Primary | ICD-10-CM

## 2022-12-14 PROCEDURE — 99204 OFFICE O/P NEW MOD 45 MIN: CPT | Performed by: STUDENT IN AN ORGANIZED HEALTH CARE EDUCATION/TRAINING PROGRAM

## 2022-12-14 PROCEDURE — G0463 HOSPITAL OUTPT CLINIC VISIT: HCPCS

## 2022-12-14 ASSESSMENT — PAIN SCALES - GENERAL: PAINLEVEL: NO PAIN (0)

## 2022-12-14 NOTE — NURSING NOTE
Chief Complaint   Patient presents with     Consult     New pt consult.     Blood pressure 138/77, pulse 87, temperature 98.4  F (36.9  C), weight 87 kg (191 lb 14.4 oz), SpO2 96 %.    RYAN MORRISON    
Recorded Pressure: LV, Ao, HR=99, Condition=Condition 1 (Left Ventricle) LV 81/3/5, (Aorta) Ao 88/34/65
Right arm;

## 2022-12-14 NOTE — LETTER
12/14/2022       RE: Dionte Mackey  [address]  [facility] [address]     Dear Colleague,    Thank you for referring your patient, Dionte Mackey, to the [facility] [place] at [facility]. Please see a copy of my visit note below.        Nephrology Clinic Visit  Dionte Mackey MRN: [id number] YOB: 1950  Primary Care Provider: Romeo Fonseca  History Obtained From: Patient  Spouse - Wife  External Document Review: Primary Care Provider    Pertinent Nephro History:  CKD G3bA1 (BL: ?1.6?)  2/2 Obstructive Nephropathy, HTN  No renal biopsy  CT Abd/Pelvis 12/2022: Moderate L [place] w/ likely crossing vessel, no stones, no masses  Tamsulosin, Atorvastatin, Lisinopril, HCTZ  -------------------------------------------------------------------------------------------------------------------------------  Visit 12/14/2022:  -Here to establish care with U tomy [facility] Nephro 12/14/2022  -Hx of TURP and L sided hydro/Urology recommendations: Saw urology 12/6/2022 and 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 Plan to undergo NM renal Scan and plan for operative cystoscopy, L retrograde pyelogram, L ureteral stent placement for 4-6 weeks.  -BP control: BP in office 138/77.  -NSAID use: Aleve occasionally - about 1-2 doses a week.    -Herbal/OTC medications: No  -Family history of kidney disease: No  -Hx of kidney stones: Brother with kidney stone, he hasn't had any  -Small Monoclonal protein on SPEP. None on UPEP. Very minimal UPCR.    Objective:  PAST MEDICAL HISTORY:  Past Medical History:   Diagnosis Date     Arthritis      BPH (benign prostatic hyperplasia)      Cryptogenic stroke (H) 08/16/2022     Depression      Depressive disorder 2005     History of spinal cord injury      Hypertension      Mumps      Non morbid obesity due to excess calories 11/09/2016     Recurrent major depressive disorder, in full remission (H) 11/09/2016      Uncomplicated asthma        PAST SURGICAL HISTORY:  Past Surgical History:   Procedure Laterality Date     BACK SURGERY      cervical fusion,neck fx repair     COLONOSCOPY       CYSTOSCOPY       CYSTOSCOPY, TRANSURETHRAL RESECTION (TUR) PROSTATE, COMBINED N/A 04/03/2015    Procedure: COMBINED CYSTOSCOPY, TRANSURETHRAL RESECTION (TUR) PROSTATE;  Surgeon: Bakari Barrios MD;  Location: [facility]     ENT SURGERY      SINUS SURGERY X 3,SEPTOPLASTY,TONSILLECTOMY     EYE SURGERY  11/10/2017     FOOT SURGERY Right      GENITOURINARY SURGERY      TURP IN 2010     HEAD & NECK SURGERY  10/15/2017     PROSTATE SURGERY       TONSILLECTOMY         MEDICATIONS:  Current Outpatient Medications   Medication Instructions     albuterol (PROAIR HFA/PROVENTIL HFA/VENTOLIN HFA) 108 (90 Base) MCG/ACT inhaler USE 2 INHALATIONS EVERY 6 HOURS     aspirin (ASA) 81 mg, Oral, DAILY     atorvastatin (LIPITOR) 20 MG tablet TAKE 1 TABLET DAILY (SCHEDULE APPOINTMENT TO DISCUSS FOR FURTHER REFILLS )     buPROPion (WELLBUTRIN XL) 150 MG 24 hr tablet TAKE 1 TABLET EVERY MORNING     DULERA 200-5 MCG/ACT inhaler USE 2 INHALATIONS TWICE A DAY (DUE FOR A FOLLOW UP IN CLINIC, CALL [phone] TO SCHEDULE)     hydrochlorothiazide (HYDRODIURIL) 25 MG tablet TAKE 1 TABLET DAILY     levothyroxine (SYNTHROID/LEVOTHROID) 25 mcg, Oral, DAILY     lisinopril (ZESTRIL) 30 MG tablet TAKE 1 TABLET DAILY     oxybutynin ER (DITROPAN-XL) 5 MG 24 hr tablet TAKE 1 TABLET DAILY     sertraline (ZOLOFT) 100 MG tablet TAKE 1 TABLET TWICE A DAY     tamsulosin (FLOMAX) 0.4 MG capsule TAKE 1 CAPSULE BY MOUTH EVERY DAY     tamsulosin (FLOMAX) 0.4 mg, Oral, DAILY     valACYclovir (VALTREX) 2,000 mg, Oral, 2 TIMES DAILY       FAMILY MEDICAL HISTORY:   Family History   Problem Relation Age of Onset     Colon Cancer Father 70     Coronary Artery Disease No family hx of      Cerebrovascular Disease No family hx of      Diabetes No family hx of        PHYSICAL EXAM:   BP  138/77   Pulse 87   Temp 98.4  F (36.9  C)   Wt 87 kg (191 lb 14.4 oz)   SpO2 96%   BMI 27.94 kg/m    GENERAL APPEARANCE: alert and no distress  EYES: nonicteric  HENT: mouth without ulcers or lesions  RESP: lungs clear to auscultation   CV: regular rhythm, normal rate, no rub  ABDOMEN: soft, nontender, normal bowel sounds, no HSM   Extremities: no clubbing, cyanosis, or edema  MS: no evidence of inflammation in joints, no muscle tenderness  SKIN: no rash  NEURO: mentation intact and speech normal  PSYCH: affect normal    LABS REVIEWED BY ME:   ANEMIA  Recent Labs   Lab Test 11/21/22  1555 11/02/20  0933 07/11/19  1142 06/01/18  0843   HGB 13.5 14.7 15.2 14.6       BMP  Recent Labs   Lab Test 12/01/22  1157 11/29/22  1349 11/21/22  1555 07/27/22  1330 11/02/20  0933 07/11/19  1142 06/01/18  0843 09/13/17  0921     --  138 134 135 137 138 136   POTASSIUM  --   --  4.7  --  4.5 4.4 4.4 4.1   CHLORIDE  --   --   --   --  105 104 105 105   CO2  --   --   --   --  26 26 27 24   ANIONGAP  --   --   --   --  4 7 6 7   BUN  --   --  20.0  --  20 16 18 17   CR 1.64* 1.67* 1.61*  --  1.00 0.93 1.12 0.91   GFRESTIMATED  --  43* 45*  --  76 84 65 83   PROTTOTAL  --   --   --   --  7.3 7.5 7.3 7.3       CBC  Recent Labs   Lab Test 11/21/22  1555 11/02/20  0933 07/11/19  1142 06/01/18  0843 09/13/17  0921   HGB 13.5 14.7 15.2 14.6 14.0   WBC  --  8.6 11.4* 9.0 5.6   HCT  --  43.4 44.1 43.5 40.3   MCV  --  92 91 91 89   PLT  --  211 317 283 226       DIABETES  Recent Labs   Lab Test 11/21/22  1555 08/16/22  1050   A1C 5.8* 5.9*       HYPONATREMIA  Recent Labs   Lab Test 12/01/22  1157   UNAR 64       MBD  Recent Labs   Lab Test 11/02/20  0933 07/11/19  1142 06/01/18  0843 09/13/17  0921   KESHAV 9.5 9.0 9.1 9.0   ALBUMIN 4.0 4.1 4.0 3.9        URINE STUDIES  Recent Labs   Lab Test 12/01/22  1157   COLOR Yellow   APPEARANCE Clear   URINEGLC Negative   URINEBILI Negative   URINEKETONE Negative   SG 1.015   UBLD Negative  "  URINEPH 5.5   PROTEIN Negative   UROBILINOGEN 0.2   NITRITE Negative   LEUKEST Negative   RBCU None Seen   WBCU None Seen     No lab results found.    ADDITIONAL LABS ORDERED/REVIEWED BY ME:  None    Assessment/Plan  CKD Stage G3bA1  Established care with [facility] 12/14/2022    Creatinine in the 0.9-1.1 range until 11/2022 when creatinine found to be 1.6. Known L UPJ obstruction likely contributing. UA 12/2022 w/o hematuria or pyuria. UPCR 0.18g/g. SPEP 12/2022 w/ \"a few very small globulin bands seen. We cannot totally rule out that one of the globulin bansds is an immunoglobin or free light chain monoclonal\". UPEP 12/2022 was negative. No history of hypercalcemia, anemia, or atypical bone pain (has hip pain due to \"bone on bone\"). Renogram done 12/2022 w/ results pending at the time of his appointment with me. Urology planning for L ureteral stent placement 12/16/2022.    CKD Etiology (Biopsy Proven: No):  -Obstructive Nephropathy  -Hypertensive Nephrosclerosis  -?MGRS (very unlikely at this point as SPEP findings 12/2022 with very small underlying monoclonal process, Negative UPEP, essentially negative UPCR. 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  ---If renal function not improving following NM renal Scan and operative cystoscopy, L retrograde pyelogram, L ureteral stent placement we will need to consider renal biopsy to evaluate for MGRS. Again, at this point, it is felt that this is quite unlikely.  -Strict BP control. He will send me his home readings in 3 weeks (checking 3x per week) but it seems like his home readings are likely adequately controlled. Continue Lisinopril and hydrochlorothiazide at current doses  -Avoid nephrotoxic agents (Advised on reducing NSAID intake)      Anemia of Renal Disease  Hemoglobin: " 13.5  Ferritin: N/A  TSAT: N/A    Plan:  -No need for EPO at this time      Lipid Management in CKD  KDIGO 2013 Guidelines recommend the following for patients with CKD:  Adults >/= 51 yo w/ CKD stage 1 or 2: Statin  Adults >/= 51 yo w/ CKD stage 3-5: Statin + Ezetimibe or Statin alone  Adults 18-49 + 1 of the following (CAD, DM, Ischemic stroke, 10 yr ASCVD risk >10%): Statin    KDIGO guidelines recommend Simvastatin 20mg/Ezetimibe 10mg qDay for patients with CKD G3a-G5 (in line with the SHARP trial). If Simvastatin used alone, 40mg qDay is referenced.   Other options include: Atorvastatin 20mg qDay (4D trial) and Rosuvastatin 10mg qDay (CIELO trial)    Plan:  -Agree with Atorvastatin      Metabolic Acidosis of Renal Disease  Bicarb: 22    Plan:  -No need for NaHCO at this ttime      Mineral Bone Disease  PTH: 64  Phos: 3.1  Calcium: 9.1    Plan:  -No need for phos binder at this time    Return to clinic: 2 months (if they are still in [place], they may leave for [place]. If that is the case, will try to line up appt with Urology follow up)    Jhonny Bhatti MD   of Medicine  Division of Nephrology and Hypertension  [facility]    45 minutes spent on the date of the encounter doing chart review, history and exam, documentation and further activities as noted above      Again, thank you for allowing me to participate in the care of your patient.      Sincerely,    Jhonny Bhatti MD

## 2022-12-14 NOTE — PATIENT INSTRUCTIONS
"It was a pleasure to see you in nephrology clinic today. I've included a brief summary of our discussion and care plan from today's visit below.  _______________________________________________________________________    My recommendations are summarized as follows:  -Keep a Blood Pressure log. Please make sure that you are using a validated blood pressure device (check \"www.validatebp.org\").  -Avoid all NSAID's. Examples include Ibuprofen (Advil, Motrin), naprosyn (Aleve), celebrex among others. Acetaminophen (Tylenol) is ok with maximum dose in 24 hours of 3000mg.  -Healthy lifestyle measures will keep your kidney's functioning at their current best. This includes regular exercise, maintaining a healthy body weight and smoking cessation.   -Please check your blood pressure 3x per week and send me your blood pressure readings in about 3 weeks (around Jan 4th).  -    Please return to Nephrology Clinic in 8 weeks to review your progress.     Who do I call with any questions after my visit?  There are multiple ways to contact your nephrology care team:    -To schedule or reschedule an appointment, please call [phone].  -Reach out via Bizily. These messages are answered by your nurse or doctor during business hours and typically in 1-2 days. Bizily messages are best for quick questions/clarifications/updates. Frequently, your doctor or nurse will recommend setting up a follow up appointment to address any significant questions/concerns.  -For urgent questions after business hours, you may reach the on-call Nephrology Fellow by contacting the [facility]  at [phone].    To schedule imaging:   -Please call [phone]     To schedule your lab appointment at the [facility]:  -Please call [phone]    Sincerely,    Dr. Jhonny Bhatti   of Medicine  Division of Nephrology and Hypertension  [facility]    "

## 2022-12-15 ENCOUNTER — ANESTHESIA EVENT (OUTPATIENT)
Dept: SURGERY | Facility: CLINIC | Age: 72
End: 2022-12-15
Payer: COMMERCIAL

## 2022-12-16 ENCOUNTER — ANESTHESIA (OUTPATIENT)
Dept: SURGERY | Facility: CLINIC | Age: 72
End: 2022-12-16
Payer: COMMERCIAL

## 2022-12-16 ENCOUNTER — HOSPITAL ENCOUNTER (OUTPATIENT)
Facility: CLINIC | Age: 72
Discharge: HOME | End: 2022-12-16
Attending: UROLOGY | Admitting: UROLOGY
Payer: COMMERCIAL

## 2022-12-16 ENCOUNTER — APPOINTMENT (OUTPATIENT)
Dept: GENERAL RADIOLOGY | Facility: CLINIC | Age: 72
End: 2022-12-16
Attending: UROLOGY
Payer: COMMERCIAL

## 2022-12-16 VITALS
WEIGHT: 193.1 LBS | SYSTOLIC BLOOD PRESSURE: 129 MMHG | OXYGEN SATURATION: 97 % | TEMPERATURE: 97.1 F | BODY MASS INDEX: 28.6 KG/M2 | HEART RATE: 87 BPM | HEIGHT: 69 IN | DIASTOLIC BLOOD PRESSURE: 71 MMHG | RESPIRATION RATE: 18 BRPM

## 2022-12-16 DIAGNOSIS — N13.5 UPJ (URETEROPELVIC JUNCTION) OBSTRUCTION: ICD-10-CM

## 2022-12-16 DIAGNOSIS — N13.30 HYDRONEPHROSIS OF LEFT KIDNEY: Primary | ICD-10-CM

## 2022-12-16 LAB — POTASSIUM BLD-SCNC: 4.5 MMOL/L (ref 3.4–5.3)

## 2022-12-16 PROCEDURE — C1769 GUIDE WIRE: HCPCS | Performed by: UROLOGY

## 2022-12-16 PROCEDURE — 74420 UROGRAPHY RTRGR +-KUB: CPT | Mod: 26 | Performed by: UROLOGY

## 2022-12-16 PROCEDURE — 250N000011 HC RX IP 250 OP 636: Performed by: NURSE ANESTHETIST, CERTIFIED REGISTERED

## 2022-12-16 PROCEDURE — 710N000012 HC RECOVERY PHASE 2, PER MINUTE: Performed by: UROLOGY

## 2022-12-16 PROCEDURE — 710N000009 HC RECOVERY PHASE 1, LEVEL 1, PER MIN: Performed by: UROLOGY

## 2022-12-16 PROCEDURE — 370N000017 HC ANESTHESIA TECHNICAL FEE, PER MIN: Performed by: UROLOGY

## 2022-12-16 PROCEDURE — 255N000002 HC RX 255 OP 636: Performed by: UROLOGY

## 2022-12-16 PROCEDURE — 250N000025 HC SEVOFLURANE, PER MIN: Performed by: UROLOGY

## 2022-12-16 PROCEDURE — 258N000003 HC RX IP 258 OP 636: Performed by: NURSE ANESTHETIST, CERTIFIED REGISTERED

## 2022-12-16 PROCEDURE — 258N000001 HC RX 258: Performed by: UROLOGY

## 2022-12-16 PROCEDURE — 84132 ASSAY OF SERUM POTASSIUM: CPT | Performed by: ANESTHESIOLOGY

## 2022-12-16 PROCEDURE — C2617 STENT, NON-COR, TEM W/O DEL: HCPCS | Performed by: UROLOGY

## 2022-12-16 PROCEDURE — 52332 CYSTOSCOPY AND TREATMENT: CPT | Mod: LT | Performed by: UROLOGY

## 2022-12-16 PROCEDURE — 250N000009 HC RX 250: Performed by: NURSE ANESTHETIST, CERTIFIED REGISTERED

## 2022-12-16 PROCEDURE — 36415 COLL VENOUS BLD VENIPUNCTURE: CPT | Performed by: ANESTHESIOLOGY

## 2022-12-16 PROCEDURE — 250N000011 HC RX IP 250 OP 636: Performed by: UROLOGY

## 2022-12-16 PROCEDURE — 272N000001 HC OR GENERAL SUPPLY STERILE: Performed by: UROLOGY

## 2022-12-16 PROCEDURE — 999N000179 XR SURGERY CARM FLUORO LESS THAN 5 MIN W STILLS

## 2022-12-16 PROCEDURE — 360N000075 HC SURGERY LEVEL 2, PER MIN: Performed by: UROLOGY

## 2022-12-16 PROCEDURE — 999N000141 HC STATISTIC PRE-PROCEDURE NURSING ASSESSMENT: Performed by: UROLOGY

## 2022-12-16 DEVICE — URETERAL STENT
Type: IMPLANTABLE DEVICE | Site: URETER | Status: FUNCTIONAL
Brand: POLARIS™ ULTRA

## 2022-12-16 RX ORDER — EPHEDRINE SULFATE 50 MG/ML
INJECTION, SOLUTION INTRAMUSCULAR; INTRAVENOUS; SUBCUTANEOUS PRN
Status: DISCONTINUED | OUTPATIENT
Start: 2022-12-16 | End: 2022-12-16

## 2022-12-16 RX ORDER — HYDROMORPHONE HCL IN WATER/PF 6 MG/30 ML
0.4 PATIENT CONTROLLED ANALGESIA SYRINGE INTRAVENOUS EVERY 5 MIN PRN
Status: DISCONTINUED | OUTPATIENT
Start: 2022-12-16 | End: 2022-12-16 | Stop reason: HOSPADM

## 2022-12-16 RX ORDER — LIDOCAINE HYDROCHLORIDE 20 MG/ML
INJECTION, SOLUTION INFILTRATION; PERINEURAL PRN
Status: DISCONTINUED | OUTPATIENT
Start: 2022-12-16 | End: 2022-12-16

## 2022-12-16 RX ORDER — FENTANYL CITRATE 0.05 MG/ML
25 INJECTION, SOLUTION INTRAMUSCULAR; INTRAVENOUS
Status: DISCONTINUED | OUTPATIENT
Start: 2022-12-16 | End: 2022-12-16 | Stop reason: HOSPADM

## 2022-12-16 RX ORDER — ONDANSETRON 4 MG/1
4 TABLET, ORALLY DISINTEGRATING ORAL EVERY 30 MIN PRN
Status: DISCONTINUED | OUTPATIENT
Start: 2022-12-16 | End: 2022-12-16 | Stop reason: HOSPADM

## 2022-12-16 RX ORDER — FENTANYL CITRATE 0.05 MG/ML
50 INJECTION, SOLUTION INTRAMUSCULAR; INTRAVENOUS EVERY 5 MIN PRN
Status: DISCONTINUED | OUTPATIENT
Start: 2022-12-16 | End: 2022-12-16 | Stop reason: HOSPADM

## 2022-12-16 RX ORDER — ONDANSETRON 2 MG/ML
INJECTION INTRAMUSCULAR; INTRAVENOUS PRN
Status: DISCONTINUED | OUTPATIENT
Start: 2022-12-16 | End: 2022-12-16

## 2022-12-16 RX ORDER — GLYCOPYRROLATE 0.2 MG/ML
INJECTION, SOLUTION INTRAMUSCULAR; INTRAVENOUS PRN
Status: DISCONTINUED | OUTPATIENT
Start: 2022-12-16 | End: 2022-12-16

## 2022-12-16 RX ORDER — SODIUM CHLORIDE, SODIUM LACTATE, POTASSIUM CHLORIDE, CALCIUM CHLORIDE 600; 310; 30; 20 MG/100ML; MG/100ML; MG/100ML; MG/100ML
INJECTION, SOLUTION INTRAVENOUS CONTINUOUS
Status: DISCONTINUED | OUTPATIENT
Start: 2022-12-16 | End: 2022-12-16 | Stop reason: HOSPADM

## 2022-12-16 RX ORDER — ONDANSETRON 2 MG/ML
4 INJECTION INTRAMUSCULAR; INTRAVENOUS EVERY 30 MIN PRN
Status: DISCONTINUED | OUTPATIENT
Start: 2022-12-16 | End: 2022-12-16 | Stop reason: HOSPADM

## 2022-12-16 RX ORDER — SODIUM CHLORIDE, SODIUM LACTATE, POTASSIUM CHLORIDE, CALCIUM CHLORIDE 600; 310; 30; 20 MG/100ML; MG/100ML; MG/100ML; MG/100ML
INJECTION, SOLUTION INTRAVENOUS CONTINUOUS PRN
Status: DISCONTINUED | OUTPATIENT
Start: 2022-12-16 | End: 2022-12-16

## 2022-12-16 RX ORDER — CEFAZOLIN SODIUM/WATER 2 G/20 ML
2 SYRINGE (ML) INTRAVENOUS SEE ADMIN INSTRUCTIONS
Status: DISCONTINUED | OUTPATIENT
Start: 2022-12-16 | End: 2022-12-16 | Stop reason: HOSPADM

## 2022-12-16 RX ORDER — FENTANYL CITRATE 0.05 MG/ML
25 INJECTION, SOLUTION INTRAMUSCULAR; INTRAVENOUS EVERY 5 MIN PRN
Status: DISCONTINUED | OUTPATIENT
Start: 2022-12-16 | End: 2022-12-16 | Stop reason: HOSPADM

## 2022-12-16 RX ORDER — PROPOFOL 10 MG/ML
INJECTION, EMULSION INTRAVENOUS PRN
Status: DISCONTINUED | OUTPATIENT
Start: 2022-12-16 | End: 2022-12-16

## 2022-12-16 RX ORDER — FENTANYL CITRATE 50 UG/ML
INJECTION, SOLUTION INTRAMUSCULAR; INTRAVENOUS PRN
Status: DISCONTINUED | OUTPATIENT
Start: 2022-12-16 | End: 2022-12-16

## 2022-12-16 RX ORDER — VASOPRESSIN IN 0.9 % NACL 2 UNIT/2ML
SYRINGE (ML) INTRAVENOUS PRN
Status: DISCONTINUED | OUTPATIENT
Start: 2022-12-16 | End: 2022-12-16

## 2022-12-16 RX ORDER — HYDROMORPHONE HCL IN WATER/PF 6 MG/30 ML
0.2 PATIENT CONTROLLED ANALGESIA SYRINGE INTRAVENOUS EVERY 5 MIN PRN
Status: DISCONTINUED | OUTPATIENT
Start: 2022-12-16 | End: 2022-12-16 | Stop reason: HOSPADM

## 2022-12-16 RX ORDER — CEFAZOLIN SODIUM/WATER 2 G/20 ML
2 SYRINGE (ML) INTRAVENOUS
Status: COMPLETED | OUTPATIENT
Start: 2022-12-16 | End: 2022-12-16

## 2022-12-16 RX ORDER — MEPERIDINE HYDROCHLORIDE 25 MG/ML
12.5 INJECTION INTRAMUSCULAR; INTRAVENOUS; SUBCUTANEOUS
Status: DISCONTINUED | OUTPATIENT
Start: 2022-12-16 | End: 2022-12-16 | Stop reason: HOSPADM

## 2022-12-16 RX ORDER — IOPAMIDOL 612 MG/ML
INJECTION, SOLUTION INTRAVASCULAR PRN
Status: DISCONTINUED | OUTPATIENT
Start: 2022-12-16 | End: 2022-12-16 | Stop reason: HOSPADM

## 2022-12-16 RX ADMIN — PHENYLEPHRINE HYDROCHLORIDE 200 MCG: 10 INJECTION INTRAVENOUS at 13:30

## 2022-12-16 RX ADMIN — Medication 10 MG: at 13:36

## 2022-12-16 RX ADMIN — Medication 5 MG: at 13:39

## 2022-12-16 RX ADMIN — PROPOFOL 50 MG: 10 INJECTION, EMULSION INTRAVENOUS at 13:26

## 2022-12-16 RX ADMIN — GLYCOPYRROLATE 0.2 MG: 0.2 INJECTION, SOLUTION INTRAMUSCULAR; INTRAVENOUS at 13:30

## 2022-12-16 RX ADMIN — PHENYLEPHRINE HYDROCHLORIDE 200 MCG: 10 INJECTION INTRAVENOUS at 13:35

## 2022-12-16 RX ADMIN — FENTANYL CITRATE 50 MCG: 50 INJECTION, SOLUTION INTRAMUSCULAR; INTRAVENOUS at 13:24

## 2022-12-16 RX ADMIN — LIDOCAINE HYDROCHLORIDE 100 MG: 20 INJECTION, SOLUTION INFILTRATION; PERINEURAL at 13:24

## 2022-12-16 RX ADMIN — Medication 1 UNITS: at 13:49

## 2022-12-16 RX ADMIN — Medication 5 MG: at 13:38

## 2022-12-16 RX ADMIN — PHENYLEPHRINE HYDROCHLORIDE 200 MCG: 10 INJECTION INTRAVENOUS at 13:38

## 2022-12-16 RX ADMIN — PHENYLEPHRINE HYDROCHLORIDE 200 MCG: 10 INJECTION INTRAVENOUS at 13:32

## 2022-12-16 RX ADMIN — ONDANSETRON 4 MG: 2 INJECTION INTRAMUSCULAR; INTRAVENOUS at 13:30

## 2022-12-16 RX ADMIN — PROPOFOL 150 MG: 10 INJECTION, EMULSION INTRAVENOUS at 13:24

## 2022-12-16 RX ADMIN — Medication 2 G: at 13:12

## 2022-12-16 RX ADMIN — PHENYLEPHRINE HYDROCHLORIDE 200 MCG: 10 INJECTION INTRAVENOUS at 13:39

## 2022-12-16 RX ADMIN — PROPOFOL 50 MG: 10 INJECTION, EMULSION INTRAVENOUS at 13:28

## 2022-12-16 RX ADMIN — SODIUM CHLORIDE, POTASSIUM CHLORIDE, SODIUM LACTATE AND CALCIUM CHLORIDE: 600; 310; 30; 20 INJECTION, SOLUTION INTRAVENOUS at 13:15

## 2022-12-16 RX ADMIN — Medication 5 MG: at 13:35

## 2022-12-16 ASSESSMENT — ACTIVITIES OF DAILY LIVING (ADL)
ADLS_ACUITY_SCORE: 35

## 2022-12-16 NOTE — ANESTHESIA POSTPROCEDURE EVALUATION
Patient: Dionte Mackey    Procedure: Procedure(s):  CYSTOSCOPY, WITH RETROGRADE PYELOGRAM AND LEFT URETERAL STENT INSERTION       Anesthesia Type:  General    Note:     Postop Pain Control: Uneventful            Sign Out: Well controlled pain   PONV: No   Neuro/Psych: Uneventful            Sign Out: Acceptable/Baseline neuro status   Airway/Respiratory: Uneventful            Sign Out: Acceptable/Baseline resp. status   CV/Hemodynamics: Uneventful            Sign Out: Acceptable CV status; No obvious hypovolemia; No obvious fluid overload   Other NRE: NONE   DID A NON-ROUTINE EVENT OCCUR? No           Last vitals:  Vitals Value Taken Time   /65 12/16/22 1452   Temp 36.2  C (97.1  F) 12/16/22 1453   Pulse 82 12/16/22 1454   Resp 16 12/16/22 1454   SpO2 97 % 12/16/22 1454   Vitals shown include unvalidated device data.    Electronically Signed By: Ruddy Tony MD  December 16, 2022  2:55 PM

## 2022-12-16 NOTE — OR NURSING
Patient is adequate for discharge to home from Phase 2. Ox4 and AVSS. Tolerating PO, denies nausea. Pain well controlled. Discharge instructions completed with wife.

## 2022-12-16 NOTE — ANESTHESIA PROCEDURE NOTES
Airway       Patient location during procedure: OR ([facility] - Operating Room or Procedural Area)  Staff -        Anesthesiologist:  Ruddy Tony MD       CRNA: Hanna Engle APRN CRNA       Performed By: CRNA  Consent for Airway        Urgency: elective  Indications and Patient Condition       Indications for airway management: stanley-procedural       Induction type:intravenous       Mask difficulty assessment: 0 - not attempted    Final Airway Details       Final airway type: supraglottic airway    Supraglottic Airway Details        Type: LMA       Brand: I-Gel       LMA size: 5    Post intubation assessment        Number of attempts at approach: 1       Number of other approaches attempted: 0       Ease of procedure: easy       Dentition: Intact and Unchanged

## 2022-12-16 NOTE — DISCHARGE INSTRUCTIONS
Same Day Surgery Discharge Instructions for  Sedation and General Anesthesia     It's not unusual to feel dizzy, light-headed or faint for up to 24 hours after surgery or while taking pain medication.  If you have these symptoms: sit for a few minutes before standing and have someone assist you when you get up to walk or use the bathroom.    You should rest and relax for the next 24 hours. We recommend you make arrangements to have an adult stay with you for at least 24 hours after your discharge.  Avoid hazardous and strenuous activity.    DO NOT DRIVE any vehicle or operate mechanical equipment for 24 hours following the end of your surgery.  Even though you may feel normal, your reactions may be affected by the medication you have received.    Do not drink alcoholic beverages for 24 hours following surgery.     Slowly progress to your regular diet as you feel able. It's not unusual to feel nauseated and/or vomit after receiving anesthesia.  If you develop these symptoms, drink clear liquids (apple juice, ginger ale, broth, 7-up, etc. ) until you feel better.  If your nausea and vomiting persists for 24 hours, please notify your surgeon.      All narcotic pain medications, along with inactivity and anesthesia, can cause constipation. Drinking plenty of liquids and increasing fiber intake will help.    For any questions of a medical nature, call your surgeon.    Do not make important decisions for 24 hours.    If you had general anesthesia, you may have a sore throat for a couple of days related to the breathing tube used during surgery.  You may use Cepacol lozenges to help with this discomfort.  If it worsens or if you develop a fever, contact your surgeon.     If you feel your pain is not well managed with the pain medications prescribed by your surgeon, please contact your surgeon's office to let them know so they can address your concerns.          POSTOPERATIVE  INSTRUCTIONS    Diagnosis-------------------------------   LEFT UPJ obstruction    Procedure-------------------------------  Procedure(s) (LRB):  CYSTOSCOPY, WITH RETROGRADE PYELOGRAM AND LEFT URETERAL STENT INSERTION (Left)      Findings--------------------------------  LEFT UPJ obstruction    Home-going instructions-----------------         Activity Limitation:     - No driving or operating heavy machinery while on narcotic pain medication.     FOLLOW THESE INSTRUCTIONS AS INDICATED BELOW:  - Observe operative area for signs of excessive bleeding.  - You may shower.  - Increase fluid intake to promote clear urine.  - Resume usual diet as tolerated    What to expect while recovering-----------  - You may experience some intermittent bleeding that makes your urine pink or cherry colored. This is normal.  - However, if you are unable to urinate, passing large amount of clots, have bryan blood in your urine, or have a temperature >101 degrees, call the urology nurse on call, or present to your nearest emergency department.  - You are encouraged to walk daily, and have no activity restrictions.   - A URETERAL STENT has been placed that allows urine to flow unobstructed from your kidney into your bladder.  The stent has a curl in the kidney and a curl in the bladder.  The curl in the bladder can cause some urgency and frequency of urination as well as some mild blood in the urine.  The curl in the kidney can cause some mild flank discomfort.  This may be more noticeable when you urinate.  A URETERAL STENT is meant to be left in temporarily.  It must be removed or changed no later than 3 months after it's insertion.  If it's not removed it can result in stone overgrowth on the stent that can cause pain, infection, and can be very difficult to remove.      Discharge Medications/instructions:   - Continue all medications  - Take Tylenol 1000mg every 6 hours for pain      Questions/concerns------------------------  [place]  [facility]: [phone]    Future appointments  You will be contacted to schedule follow up with Dr. Eugene.    Wisam Eugene MD     **If you have questions or concerns about your procedure,   call Dr. Eugene at [phone]**

## 2022-12-16 NOTE — OP NOTE
OPERATIVE REPORT  DATE OF SURGERY: 12/16/22  LOCATION OF SURGERY: [facility]  PREOPERATIVE DIAGNOSIS:  (N13.30) Hydronephrosis of left kidney  (primary encounter diagnosis)  (N13.5) UPJ (ureteropelvic junction) obstruction  POSTOPERATIVE DIAGNOSIS: (N13.30) Hydronephrosis of left kidney  (primary encounter diagnosis)  (N13.5) UPJ (ureteropelvic junction) obstruction     START TIME: 1:36 PM  END TIME: 1:48 PM    PROCEDURE PERFORMED:   1. Cystoscopy  2. LEFT retrograde pyelogram  3. LEFT JJ stent placement  4. <1hr physician fluoroscopy time      STAFF SURGEON: Wisam Eugene MD  ANESTHESIA: General.   ESTIMATED BLOOD LOSS: 0 mL.   DRAINS AND TUBES: LEFT 6fr x 26cm ureteral stent  COMPLICATIONS: None.   DISPOSITION: PACU.   SPECIMENS OBTAINED: None  SIGNIFICANT FINDINGS: Cystoscopy with no intravesical abnormalities.  Left retrograde pyelogram with evidence of a left UPJ obstruction.  Left ureteral stent placed.     HISTORY OF PRESENT ILLNESS: Dionte Mackey is a 72 year old man who was undergoing preoperative work-up for a right inguinal hernia and was noted to have a change in his serum creatinine from 2 years ago now at 1.6 from 1.0 in 2020.  He was noted to have left-sided hydronephrosis and imaging consistent with a UPJ obstruction.  A nuclear medicine renal scan was obtained with evidence of 17.7% split function of the left kidney.  He was counseled on treatment options and elected to proceed with the above surgery.    OPERATION PERFORMED:   Informed consent was obtained and the patient was brought to the operating room where general anesthesia was induced. The patient was given appropriate preoperative antibiotics and positioned supine. The patient was then repositioned in dorsal lithotomy with all pressure points padded. We then performed a timeout, verifying the correct patient's site and procedure to be performed.    22 Maori cystoscope was inserted atraumatically into the bladder.  Cystoscopy was  performed with no evidence of intravesical abnormalities.  He does have history of a prior TURP with evidence of this in his prostatic urethra which was open.  The left ureteral orifice was identified and cannulated with a 0.035 sensor wire which was advanced into the renal pelvis under fluoroscopic guidance.  A 5 Swedish open-ended catheter was advanced up to the proximal ureter and a gentle retrograde pyelogram was performed with evidence of a UPJ obstruction.  The wire was replaced in the renal pelvis and the open-ended catheter was removed.  A 6 Swedish by 26 cm JJ ureteral stent was advanced over the wire with good curl noted in the renal pelvis fluoroscopically and in the bladder on direct vision.  The bladder was drained and the cystoscope was removed.  He was emerged from anesthesia and taken to the recovery room in stable condition.    Plan:  - We will plan for a repeat nuclear medicine renal scan and labs in approximately 6 weeks    Wisam Eugene MD   Urology  [facility]  Clinic Phone [phone]

## 2022-12-16 NOTE — ANESTHESIA CARE TRANSFER NOTE
Patient: Dointe Mackey    Procedure: Procedure(s):  CYSTOSCOPY, WITH RETROGRADE PYELOGRAM AND LEFT URETERAL STENT INSERTION       Diagnosis: UPJ (ureteropelvic junction) obstruction [N13.5]  Hydronephrosis, unspecified hydronephrosis type [N13.30]  ARIC (acute kidney injury) (H) [N17.9]  Diagnosis Additional Information: No value filed.    Anesthesia Type:   General     Note:    Oropharynx: oropharynx clear of all foreign objects and spontaneously breathing  Level of Consciousness: drowsy  Oxygen Supplementation: face mask  Level of Supplemental Oxygen (L/min / FiO2): 6  Independent Airway: airway patency satisfactory and stable  Dentition: dentition unchanged  Vital Signs Stable: post-procedure vital signs reviewed and stable  Report to RN Given: handoff report given  Patient transferred to: PACU  Comments: At end of procedure, spontaneous respirations, adequate tidal volumes, followed commands to voice, LMA removed atraumatically, oropharynx suctioned, airway patent after LMA removal. Oxygen via facemask at 6 liters per minute to PACU. Oxygen tubing connected to wall O2 in PACU, SpO2, NiBP, and EKG monitors and alarms on and functioning, Jenni Hugger warmer connected to patient gown, report on patient's clinical status given to PACU RN, RN questions answered.  Handoff Report: Identifed the Patient, Identified the Reponsible Provider, Reviewed the pertinent medical history, Discussed the surgical course, Reviewed Intra-OP anesthesia mangement and issues during anesthesia, Set expectations for post-procedure period and Allowed opportunity for questions and acknowledgement of understanding      Vitals:  Vitals Value Taken Time   BP     Temp     Pulse     Resp     SpO2         Electronically Signed By: GIACOMO Go CRNA  December 16, 2022  1:47 PM

## 2022-12-16 NOTE — ANESTHESIA PREPROCEDURE EVALUATION
Anesthesia Pre-Procedure Evaluation    Patient: Dionte Mackey   MRN: [id number] : 1950        Procedure : Procedure(s):  CYSTOSCOPY, WITH RETROGRADE PYELOGRAM AND LEFT URETERAL STENT INSERTION          Past Medical History:   Diagnosis Date     Arthritis      BPH (benign prostatic hyperplasia)      Cryptogenic stroke (H) 2022     Depression      Depressive disorder 2005     History of spinal cord injury      Hypertension      Mumps      Non morbid obesity due to excess calories 2016     Recurrent major depressive disorder, in full remission (H) 2016     Thyroid disease      Uncomplicated asthma       Past Surgical History:   Procedure Laterality Date     BACK SURGERY      cervical fusion,neck fx repair     COLONOSCOPY       CYSTOSCOPY       CYSTOSCOPY, TRANSURETHRAL RESECTION (TUR) PROSTATE, COMBINED N/A 2015    Procedure: COMBINED CYSTOSCOPY, TRANSURETHRAL RESECTION (TUR) PROSTATE;  Surgeon: Bakari Barrios MD;  Location: [facility] OR     ENT SURGERY      SINUS SURGERY X 3,SEPTOPLASTY,TONSILLECTOMY     EYE SURGERY  11/10/2017     FOOT SURGERY Right      GENITOURINARY SURGERY      TURP IN      HEAD & NECK SURGERY  10/15/2017     PROSTATE SURGERY       TONSILLECTOMY        Allergies   Allergen Reactions     Ciprofloxacin Shortness Of Breath, Itching and Difficulty breathing     Moxifloxacin Hives      Social History     Tobacco Use     Smoking status: Never     Smokeless tobacco: Never   Substance Use Topics     Alcohol use: Yes     Alcohol/week: 3.0 - 4.0 standard drinks     Comment: 3 drinks on weekend days      Wt Readings from Last 1 Encounters:   22 87.6 kg (193 lb 1.6 oz)        Anesthesia Evaluation   Pt has had prior anesthetic.     No history of anesthetic complications       ROS/MED HX  ENT/Pulmonary:     (+) asthma  (-) sleep apnea   Neurologic:     (+) CVA, without deficits,     Cardiovascular:     (+) Dyslipidemia hypertension-----    METS/Exercise Tolerance:      Hematologic:       Musculoskeletal:   (+) arthritis,     GI/Hepatic:    (-) GERD and liver disease   Renal/Genitourinary:     (+) renal disease, Nephrolithiasis , BPH,     Endo:     (+) thyroid problem,     Psychiatric/Substance Use:     (+) psychiatric history depression     Infectious Disease:       Malignancy:       Other:            Physical Exam    Airway        Mallampati: II   TM distance: > 3 FB   Neck ROM: full   Mouth opening: > 3 cm    Respiratory Devices and Support         Dental  no notable dental history         Cardiovascular   cardiovascular exam normal          Pulmonary   pulmonary exam normal                OUTSIDE LABS:  CBC:   Lab Results   Component Value Date    WBC 8.6 11/02/2020    WBC 11.4 (H) 07/11/2019    HGB 13.5 11/21/2022    HGB 14.7 11/02/2020    HCT 43.4 11/02/2020    HCT 44.1 07/11/2019     11/02/2020     07/11/2019     BMP:   Lab Results   Component Value Date     (L) 12/12/2022     12/01/2022    POTASSIUM 4.8 12/12/2022    POTASSIUM 4.7 11/21/2022    CHLORIDE 102 12/12/2022    CHLORIDE 105 11/02/2020    CO2 22 12/12/2022    CO2 26 11/02/2020    BUN 22.8 12/12/2022    BUN 20.0 11/21/2022    CR 1.65 (H) 12/12/2022    CR 1.64 (H) 12/01/2022     (H) 12/12/2022     (H) 11/02/2020     COAGS: No results found for: PTT, INR, FIBR  POC:   Lab Results   Component Value Date     (H) 04/04/2015     HEPATIC:   Lab Results   Component Value Date    ALBUMIN 4.3 12/12/2022    PROTTOTAL 7.3 11/02/2020    ALT 67 11/02/2020    AST 42 11/02/2020    ALKPHOS 74 11/02/2020    BILITOTAL 0.4 11/02/2020     OTHER:   Lab Results   Component Value Date    A1C 5.8 (H) 11/21/2022    KESHAV 9.1 12/12/2022    PHOS 3.1 12/12/2022    TSH 6.16 (H) 12/01/2022    T4 0.89 (L) 12/01/2022       Anesthesia Plan    ASA Status:  2   NPO Status:  NPO Appropriate    Anesthesia Type: General.     - Airway: LMA   Induction: Intravenous.   Maintenance: Balanced.         Consents    Anesthesia Plan(s) and associated risks, benefits, and realistic alternatives discussed. Questions answered and patient/representative(s) expressed understanding.    - Discussed:     - Discussed with:  Patient         Postoperative Care    Pain management: Multi-modal analgesia.   PONV prophylaxis: Ondansetron (or other 5HT-3), Dexamethasone or Solumedrol, Background Propofol Infusion     Comments:                Ruddy Tony MD

## 2022-12-23 ENCOUNTER — DOCUMENTATION ONLY (OUTPATIENT)
Dept: LAB | Facility: CLINIC | Age: 72
End: 2022-12-23

## 2022-12-23 DIAGNOSIS — E03.8 SUBCLINICAL HYPOTHYROIDISM: ICD-10-CM

## 2022-12-23 DIAGNOSIS — N17.9 AKI (ACUTE KIDNEY INJURY) (H): Primary | ICD-10-CM

## 2022-12-23 NOTE — PROGRESS NOTES
Dionte Mackey has an upcoming lab appointment:    Future Appointments   Date Time Provider Department [place]   12/26/2022  7:45 AM  LAB [facility]    12/29/2022  3:00 PM Romeo Fonseca MD [facility]    2/15/2023  1:00 PM  LAB [facility]   2/15/2023  2:00 PM Jhonny Bhatti MD [facility]     Patient is scheduled for the following lab(s): pt requesting labs prior to appointment. Please order if necessary, thank you    There is no order available. Please review and place either future orders or HMPO (Review of Health Maintenance Protocol Orders), as appropriate.    There are no preventive care reminders to display for this patient.  Aixa Morales

## 2022-12-26 ENCOUNTER — LAB (OUTPATIENT)
Dept: LAB | Facility: CLINIC | Age: 72
End: 2022-12-26
Payer: COMMERCIAL

## 2022-12-26 DIAGNOSIS — N17.9 AKI (ACUTE KIDNEY INJURY) (H): ICD-10-CM

## 2022-12-26 DIAGNOSIS — E03.8 SUBCLINICAL HYPOTHYROIDISM: ICD-10-CM

## 2022-12-26 LAB
CREAT SERPL-MCNC: 1.66 MG/DL (ref 0.67–1.17)
GFR SERPL CREATININE-BSD FRML MDRD: 44 ML/MIN/1.73M2
T4 FREE SERPL-MCNC: 0.78 NG/DL (ref 0.9–1.7)
TSH SERPL DL<=0.005 MIU/L-ACNC: 5.19 UIU/ML (ref 0.3–4.2)

## 2022-12-26 PROCEDURE — 82565 ASSAY OF CREATININE: CPT

## 2022-12-26 PROCEDURE — 84439 ASSAY OF FREE THYROXINE: CPT

## 2022-12-26 PROCEDURE — 84443 ASSAY THYROID STIM HORMONE: CPT

## 2022-12-26 PROCEDURE — 36415 COLL VENOUS BLD VENIPUNCTURE: CPT

## 2022-12-26 NOTE — RESULT ENCOUNTER NOTE
The following letter pertains to your most recent diagnostic tests:    The kidney function test remains stably moderately impaired.  Unfortunately, the procedure did not measurably improve kidney function.  I believe you have an appointment with Dr. Bhatti in February to follow up on your kidneys.      The thyroid tests indicates mildly low thyroid hormone levels, but it is too soon to tell whether the medication you started is having an impact as it takes a minimum of 6 weeks or regular levothyroxine therapy to change the blood test result.  The thyroid tests were simply checked too soon.  We should check again in about one month.  You can schedule a lab appointment for that purpose.     We have an appointment to discuss all this later this week as well.          Sincerely,    Dr. Fonseca

## 2022-12-28 ASSESSMENT — ASTHMA QUESTIONNAIRES
QUESTION_4 LAST FOUR WEEKS HOW OFTEN HAVE YOU USED YOUR RESCUE INHALER OR NEBULIZER MEDICATION (SUCH AS ALBUTEROL): NOT AT ALL
QUESTION_2 LAST FOUR WEEKS HOW OFTEN HAVE YOU HAD SHORTNESS OF BREATH: NOT AT ALL
ACT_TOTALSCORE: 24
QUESTION_3 LAST FOUR WEEKS HOW OFTEN DID YOUR ASTHMA SYMPTOMS (WHEEZING, COUGHING, SHORTNESS OF BREATH, CHEST TIGHTNESS OR PAIN) WAKE YOU UP AT NIGHT OR EARLIER THAN USUAL IN THE MORNING: NOT AT ALL
ACT_TOTALSCORE: 24
QUESTION_1 LAST FOUR WEEKS HOW MUCH OF THE TIME DID YOUR ASTHMA KEEP YOU FROM GETTING AS MUCH DONE AT WORK, SCHOOL OR AT HOME: A LITTLE OF THE TIME
QUESTION_5 LAST FOUR WEEKS HOW WOULD YOU RATE YOUR ASTHMA CONTROL: COMPLETELY CONTROLLED

## 2022-12-28 ASSESSMENT — ENCOUNTER SYMPTOMS
SORE THROAT: 0
SHORTNESS OF BREATH: 0
PARESTHESIAS: 0
WEAKNESS: 0
MYALGIAS: 1
NAUSEA: 0
COUGH: 0
ABDOMINAL PAIN: 0
PALPITATIONS: 0
HEMATOCHEZIA: 0
FREQUENCY: 1
EYE PAIN: 0
DIZZINESS: 0
CHILLS: 0
ARTHRALGIAS: 1
CONSTIPATION: 0
FEVER: 0
NERVOUS/ANXIOUS: 0
DYSURIA: 0
JOINT SWELLING: 0
HEARTBURN: 0
HEMATURIA: 0
DIARRHEA: 0
HEADACHES: 0

## 2022-12-28 ASSESSMENT — ACTIVITIES OF DAILY LIVING (ADL): CURRENT_FUNCTION: NO ASSISTANCE NEEDED

## 2022-12-29 ENCOUNTER — OFFICE VISIT (OUTPATIENT)
Dept: FAMILY MEDICINE | Facility: CLINIC | Age: 72
End: 2022-12-29
Payer: COMMERCIAL

## 2022-12-29 VITALS
HEART RATE: 90 BPM | SYSTOLIC BLOOD PRESSURE: 130 MMHG | RESPIRATION RATE: 18 BRPM | BODY MASS INDEX: 28.14 KG/M2 | HEIGHT: 69 IN | OXYGEN SATURATION: 96 % | DIASTOLIC BLOOD PRESSURE: 67 MMHG | WEIGHT: 190 LBS | TEMPERATURE: 97.6 F

## 2022-12-29 DIAGNOSIS — R41.3 MEMORY LOSS: ICD-10-CM

## 2022-12-29 DIAGNOSIS — Z12.5 PROSTATE CANCER SCREENING: ICD-10-CM

## 2022-12-29 DIAGNOSIS — E78.5 HYPERLIPIDEMIA LDL GOAL <70: ICD-10-CM

## 2022-12-29 DIAGNOSIS — E53.8 VITAMIN B12 DEFICIENCY (NON ANEMIC): ICD-10-CM

## 2022-12-29 DIAGNOSIS — B00.1 RECURRENT COLD SORES: ICD-10-CM

## 2022-12-29 DIAGNOSIS — J45.40 MODERATE PERSISTENT ASTHMA WITHOUT COMPLICATION: ICD-10-CM

## 2022-12-29 DIAGNOSIS — E03.9 HYPOTHYROIDISM, UNSPECIFIED TYPE: ICD-10-CM

## 2022-12-29 DIAGNOSIS — F33.42 RECURRENT MAJOR DEPRESSIVE DISORDER, IN FULL REMISSION (H): ICD-10-CM

## 2022-12-29 DIAGNOSIS — I63.9 CRYPTOGENIC STROKE (H): ICD-10-CM

## 2022-12-29 DIAGNOSIS — N18.32 STAGE 3B CHRONIC KIDNEY DISEASE (H): ICD-10-CM

## 2022-12-29 DIAGNOSIS — I10 BENIGN ESSENTIAL HYPERTENSION: ICD-10-CM

## 2022-12-29 DIAGNOSIS — D47.2 MGUS (MONOCLONAL GAMMOPATHY OF UNKNOWN SIGNIFICANCE): ICD-10-CM

## 2022-12-29 DIAGNOSIS — Z00.00 ROUTINE GENERAL MEDICAL EXAMINATION AT A HEALTH CARE FACILITY: Primary | ICD-10-CM

## 2022-12-29 DIAGNOSIS — N40.1 BENIGN PROSTATIC HYPERPLASIA WITH LOWER URINARY TRACT SYMPTOMS, SYMPTOM DETAILS UNSPECIFIED: ICD-10-CM

## 2022-12-29 DIAGNOSIS — E03.8 SUBCLINICAL HYPOTHYROIDISM: ICD-10-CM

## 2022-12-29 DIAGNOSIS — K40.90 UNILATERAL INGUINAL HERNIA WITHOUT OBSTRUCTION OR GANGRENE, RECURRENCE NOT SPECIFIED: ICD-10-CM

## 2022-12-29 PROCEDURE — 99214 OFFICE O/P EST MOD 30 MIN: CPT | Mod: 25 | Performed by: INTERNAL MEDICINE

## 2022-12-29 PROCEDURE — G0439 PPPS, SUBSEQ VISIT: HCPCS | Performed by: INTERNAL MEDICINE

## 2022-12-29 RX ORDER — VALACYCLOVIR HYDROCHLORIDE 1 G/1
1000 TABLET, FILM COATED ORAL 2 TIMES DAILY
Qty: 4 TABLET | Refills: 11 | Status: SHIPPED | OUTPATIENT
Start: 2022-12-29

## 2022-12-29 ASSESSMENT — ENCOUNTER SYMPTOMS
PARESTHESIAS: 0
CONSTIPATION: 0
DIZZINESS: 0
PALPITATIONS: 0
MYALGIAS: 1
ARTHRALGIAS: 1
DYSURIA: 0
JOINT SWELLING: 0
EYE PAIN: 0
HEADACHES: 0
HEMATURIA: 0
SHORTNESS OF BREATH: 0
WEAKNESS: 0
FREQUENCY: 1
CHILLS: 0
ABDOMINAL PAIN: 0
SORE THROAT: 0
COUGH: 0
NAUSEA: 0
NERVOUS/ANXIOUS: 0
DIARRHEA: 0
FEVER: 0
HEARTBURN: 0
HEMATOCHEZIA: 0

## 2022-12-29 ASSESSMENT — PATIENT HEALTH QUESTIONNAIRE - PHQ9: SUM OF ALL RESPONSES TO PHQ QUESTIONS 1-9: 0

## 2022-12-29 ASSESSMENT — ACTIVITIES OF DAILY LIVING (ADL): CURRENT_FUNCTION: NO ASSISTANCE NEEDED

## 2022-12-29 ASSESSMENT — PAIN SCALES - GENERAL: PAINLEVEL: NO PAIN (0)

## 2022-12-29 NOTE — PROGRESS NOTES
"SUBJECTIVE:   Andres is a 72 year old who presents for Preventive Visit.  Patient has been advised of split billing requirements and indicates understanding: Yes  Are you in the first 12 months of your Medicare coverage?  No    Healthy Habits:     In general, how would you rate your overall health?  Good    Frequency of exercise:  2-3 days/week    Duration of exercise:  15-30 minutes    Do you usually eat at least 4 servings of fruit and vegetables a day, include whole grains    & fiber and avoid regularly eating high fat or \"junk\" foods?  Yes    Medication side effects:  None    Ability to successfully perform activities of daily living:  No assistance needed    Home Safety:  No safety concerns identified    Hearing Impairment:  Difficulty following a conversation in a noisy restaurant or crowded room, feel that people are mumbling or not speaking clearly, need to ask people to speak up or repeat themselves, find that men's voices are easier to understand than woman's and difficulty understanding soft or whispered speech    In the past 6 months, have you been bothered by leaking of urine? Yes    In general, how would you rate your overall mental or emotional health?  Excellent      PHQ-2 Total Score: 0    Additional concerns today:  Yes      Have you ever done Advance Care Planning? (For example, a Health Directive, POLST, or a discussion with a medical provider or your loved ones about your wishes): Yes, patient states has an Advance Care Planning document and will bring a copy to the clinic.       Fall risk  Fallen 2 or more times in the past year?: No  Any fall with injury in the past year?: No  click delete button to remove this line now  Cognitive Screening   1) Repeat 3 items (Leader, Season, Table)    2) Clock draw: NORMAL  3) 3 item recall: Recalls 1 object   Results: NORMAL clock, 1-2 items recalled: COGNITIVE IMPAIRMENT LESS LIKELY    Mini-CogTM Copyright S Maria Isabel. Licensed by the author for use in [facility]; reprinted with permission (soob@.[facility]). All rights reserved.      Do you have sleep apnea, excessive snoring or daytime drowsiness?: yes    Reviewed and updated as needed this visit by clinical staff    Allergies  Meds              Reviewed and updated as needed this visit by Provider                 Social History     Tobacco Use     Smoking status: Never     Smokeless tobacco: Never   Substance Use Topics     Alcohol use: Yes     Alcohol/week: 3.0 - 4.0 standard drinks     Comment: 3 drinks on weekend days         Alcohol Use 12/28/2022   Prescreen: >3 drinks/day or >7 drinks/week? No   Prescreen: >3 drinks/day or >7 drinks/week? -         Current providers sharing in care for this patient include:   Patient Care Team:  Romeo Fonseca MD as PCP - General (Internal Medicine)  Romeo Fonseca MD as Assigned PCP  Wisam Eugene MD as MD (Urology)  Jhonny Bhatti MD as MD (Nephrology)    The following health maintenance items are reviewed in Epic and correct as of today:  Health Maintenance   Topic Date Due     DEPRESSION ACTION PLAN  Never done     ASTHMA ACTION PLAN  09/13/2018     MEDICARE ANNUAL WELLNESS VISIT  11/02/2021     LIPID  11/02/2021     PHQ-9  01/25/2023     MICROALBUMIN  06/12/2023     ASTHMA CONTROL TEST  06/29/2023     ANNUAL REVIEW OF HM ORDERS  07/25/2023     DTAP/TDAP/TD IMMUNIZATION (3 - Td or Tdap) 08/18/2023     HEMOGLOBIN  11/21/2023     BMP  12/12/2023     FALL RISK ASSESSMENT  12/29/2023     COLORECTAL CANCER SCREENING  12/09/2025     ADVANCE CARE PLANNING  12/29/2027     PARATHYROID  Completed     PHOSPHORUS  Completed     HEPATITIS C SCREENING  Completed     INFLUENZA VACCINE  Completed     Pneumococcal Vaccine: 65+ Years  Completed     URINALYSIS  Completed     ALK PHOS  Completed     ZOSTER IMMUNIZATION  Completed     AORTIC ANEURYSM SCREENING (SYSTEM ASSIGNED)  Completed     COVID-19 Vaccine  Completed     IPV IMMUNIZATION  Aged Out     MENINGITIS IMMUNIZATION   Aged Out     Patient Active Problem List   Diagnosis     BPH (benign prostatic hyperplasia)     Hematuria     Benign essential hypertension     Seasonal allergic rhinitis     Moderate persistent asthma without complication     Vasculogenic erectile dysfunction     Herpes zoster without complication     Recurrent major depressive disorder, in full remission (H)     Non morbid obesity due to excess calories     Hyperlipidemia LDL goal <70     IFG (impaired fasting glucose)     Cryptogenic stroke (H)     Memory loss     Vitamin B12 deficiency (non anemic)     Borderline abnormal TFTs     Hypothyroidism, unspecified type     Stage 3b chronic kidney disease (H)     MGUS (monoclonal gammopathy of unknown significance)     Past Surgical History:   Procedure Laterality Date     BACK SURGERY      cervical fusion,neck fx repair     COLONOSCOPY       CYSTOSCOPY       CYSTOSCOPY, RETROGRADES, INSERT STENT URETER(S), COMBINED Left 12/16/2022    Procedure: CYSTOSCOPY, WITH RETROGRADE PYELOGRAM AND LEFT URETERAL STENT INSERTION;  Surgeon: Wisam Eugene MD;  Location:  OR     CYSTOSCOPY, TRANSURETHRAL RESECTION (TUR) PROSTATE, COMBINED N/A 04/03/2015    Procedure: COMBINED CYSTOSCOPY, TRANSURETHRAL RESECTION (TUR) PROSTATE;  Surgeon: Bakari Barrios MD;  Location:  OR     ENT SURGERY      SINUS SURGERY X 3,SEPTOPLASTY,TONSILLECTOMY     EYE SURGERY  11/10/2017     FOOT SURGERY Right      GENITOURINARY SURGERY      TURP IN 2010     HEAD & NECK SURGERY  10/15/2017     PROSTATE SURGERY       TONSILLECTOMY         Social History     Tobacco Use     Smoking status: Never     Smokeless tobacco: Never   Substance Use Topics     Alcohol use: Yes     Alcohol/week: 3.0 - 4.0 standard drinks     Comment: 3 drinks on weekend days     Family History   Problem Relation Age of Onset     Colon Cancer Father 70     Coronary Artery Disease No family hx of      Cerebrovascular Disease No family hx of      Diabetes No family hx of           Current Outpatient Medications   Medication Sig Dispense Refill     albuterol (PROAIR HFA/PROVENTIL HFA/VENTOLIN HFA) 108 (90 Base) MCG/ACT inhaler USE 2 INHALATIONS EVERY 6 HOURS 8.5 g 11     aspirin (ASA) 81 MG EC tablet Take 1 tablet (81 mg) by mouth daily       atorvastatin (LIPITOR) 20 MG tablet TAKE 1 TABLET DAILY (SCHEDULE APPOINTMENT TO DISCUSS FOR FURTHER REFILLS ) 90 tablet 1     buPROPion (WELLBUTRIN XL) 150 MG 24 hr tablet TAKE 1 TABLET EVERY MORNING 90 tablet 0     DULERA 200-5 MCG/ACT inhaler USE 2 INHALATIONS TWICE A DAY (DUE FOR A FOLLOW UP IN CLINIC, CALL [phone] TO SCHEDULE) 39 g 3     hydrochlorothiazide (HYDRODIURIL) 25 MG tablet TAKE 1 TABLET DAILY 90 tablet 3     levothyroxine (SYNTHROID/LEVOTHROID) 25 MCG tablet Take 1 tablet (25 mcg) by mouth daily 90 tablet 3     lisinopril (ZESTRIL) 30 MG tablet TAKE 1 TABLET DAILY 90 tablet 3     oxybutynin ER (DITROPAN-XL) 5 MG 24 hr tablet TAKE 1 TABLET DAILY 30 tablet 0     sertraline (ZOLOFT) 100 MG tablet TAKE 1 TABLET TWICE A  tablet 0     tamsulosin (FLOMAX) 0.4 MG capsule Take 1 capsule (0.4 mg) by mouth daily 30 capsule 4     tamsulosin (FLOMAX) 0.4 MG capsule TAKE 1 CAPSULE BY MOUTH EVERY DAY 90 capsule 1     valACYclovir (VALTREX) 1000 mg tablet Take 1 tablet (1,000 mg) by mouth 2 times daily 4 tablet 11     Allergies   Allergen Reactions     Ciprofloxacin Shortness Of Breath, Itching and Difficulty breathing     Moxifloxacin Hives       Review of Systems   Constitutional: Negative for chills and fever.   HENT: Positive for hearing loss. Negative for congestion, ear pain and sore throat.    Eyes: Negative for pain and visual disturbance.   Respiratory: Negative for cough and shortness of breath.    Cardiovascular: Negative for chest pain, palpitations and peripheral edema.   Gastrointestinal: Negative for abdominal pain, constipation, diarrhea, heartburn, hematochezia and nausea.   Genitourinary: Positive for  "frequency, impotence and urgency. Negative for dysuria, genital sores, hematuria and penile discharge.   Musculoskeletal: Positive for arthralgias and myalgias. Negative for joint swelling.   Skin: Negative for rash.   Neurological: Negative for dizziness, weakness, headaches and paresthesias.   Psychiatric/Behavioral: Negative for mood changes. The patient is not nervous/anxious.          OBJECTIVE:   /67 (BP Location: Left arm, Patient Position: Sitting, Cuff Size: Adult Large)   Pulse 90   Temp 97.6  F (36.4  C) (Temporal)   Resp 18   Ht 1.753 m (5' 9\")   Wt 86.2 kg (190 lb)   SpO2 96%   BMI 28.06 kg/m   Estimated body mass index is 28.06 kg/m  as calculated from the following:    Height as of this encounter: 1.753 m (5' 9\").    Weight as of this encounter: 86.2 kg (190 lb).  Physical Exam  GENERAL: healthy, alert and no distress  EYES: Eyes grossly normal to inspection, PERRL and conjunctivae and sclerae normal  HENT: ear canals and TM's normal, nose and mouth without ulcers or lesions  NECK: no adenopathy, no asymmetry, masses, or scars and thyroid normal to palpation  RESP: lungs clear to auscultation - no rales, rhonchi or wheezes  CV: regular rate and rhythm, normal S1 S2, no S3 or S4, no murmur, click or rub, no peripheral edema and peripheral pulses strong  ABDOMEN: soft, nontender, no hepatosplenomegaly, no masses and bowel sounds normal  MS: no gross musculoskeletal defects noted, no edema  SKIN: no suspicious lesions or rashes  NEURO: Normal strength and tone, mentation intact and speech normal  PSYCH: mentation appears normal, affect normal/bright        ASSESSMENT / PLAN:       ICD-10-CM    1. Routine general medical examination at a health care facility  Z00.00       2. Stage 3b chronic kidney disease (H)  N18.32       3. Hypothyroidism, unspecified type  E03.9       4. Memory loss  R41.3       5. MGUS (monoclonal gammopathy of unknown significance)  D47.2       6. Benign essential " hypertension  I10       7. Hyperlipidemia LDL goal <70  E78.5       8. Benign prostatic hyperplasia with lower urinary tract symptoms, symptom details unspecified  N40.1       9. Vitamin B12 deficiency (non anemic)  E53.8       10. IFG (impaired fasting glucose)  R73.01       11. Cryptogenic stroke (H)  I63.9       12. Recurrent major depressive disorder, in full remission (H)  F33.42       13. Moderate persistent asthma without complication  J45.40       14. Unilateral inguinal hernia without obstruction or gangrene, recurrence not specified  K40.90       15. Recurrent cold sores  B00.1 valACYclovir (VALTREX) 1000 mg tablet      It looks like his kidney disease is medical and chronic  Follow up with nephrology as directed   Recheck TSH at 6 week interval and increase levothyroxine accordingly   Consider neuropsych testing after TFTs normalized  Arrange for hematology follow up for MGUS found incidentally during ARF work up; not an emergency explained to patient and his wife   Blood pressure OK  On statin therapy  LUTS OK  Mood OK all things considered  Asthma stable  Need to renally dose valtres  And will be due for PSA , will  with TSH at 6 week interval  His hip hurts too, incidental OA noted on CT, I told him to see one [facility] the orthos [facility]  Plan for hernia surgery when he gets back for [place] this spring    Patient has been advised of split billing requirements and indicates understanding: Yes      COUNSELING:  Reviewed preventive health counseling, as reflected in patient instructions  Special attention given to:       Consider AAA screening for ages 65-75 and smoking history; vessel has been imaged        Regular exercise       Healthy diet/nutrition       Immunizations      Vaccines are up to date            Hepatitis C screening       HIV screening for high risk patient       Consider lung cancer screening for ages 55-80 years (77 for Medicare) and 20 pack-year smoking history ; never smoker         "Colon cancer screening; repeat 12/2023       Prostate cancer screening; PSA with next lab draw      BMI:   Estimated body mass index is 28.06 kg/m  as calculated from the following:    Height as of this encounter: 1.753 m (5' 9\").    Weight as of this encounter: 86.2 kg (190 lb).   Weight management plan: Discussed healthy diet and exercise guidelines      He reports that he has never smoked. He has never used smokeless tobacco.      Appropriate preventive services were discussed with this patient, including applicable screening as appropriate for cardiovascular disease, diabetes, osteopenia/osteoporosis, and glaucoma.  As appropriate for age/gender, discussed screening for colorectal cancer, prostate cancer, breast cancer, and cervical cancer. Checklist reviewing preventive services available has been given to the patient.    Reviewed patients plan of care and provided an AVS. The Basic Care Plan (routine screening as documented in Health Maintenance) for Dionte meets the Care Plan requirement. This Care Plan has been established and reviewed with the Patient and spouse.      Romeo Fonseca MD  [facility]    Identified Health Risks:  "

## 2023-01-02 ENCOUNTER — LAB (OUTPATIENT)
Dept: LAB | Facility: CLINIC | Age: 73
End: 2023-01-02
Payer: COMMERCIAL

## 2023-01-02 DIAGNOSIS — E78.5 HYPERLIPIDEMIA LDL GOAL <70: ICD-10-CM

## 2023-01-02 DIAGNOSIS — E03.9 HYPOTHYROIDISM, UNSPECIFIED TYPE: ICD-10-CM

## 2023-01-02 DIAGNOSIS — Z12.5 PROSTATE CANCER SCREENING: ICD-10-CM

## 2023-01-02 LAB
CHOLEST SERPL-MCNC: 142 MG/DL
HDLC SERPL-MCNC: 49 MG/DL
LDLC SERPL CALC-MCNC: 55 MG/DL
NONHDLC SERPL-MCNC: 93 MG/DL
PSA SERPL-MCNC: 1.16 NG/ML (ref 0–6.5)
TRIGL SERPL-MCNC: 188 MG/DL
TSH SERPL DL<=0.005 MIU/L-ACNC: 5.14 UIU/ML (ref 0.3–4.2)

## 2023-01-02 PROCEDURE — G0103 PSA SCREENING: HCPCS

## 2023-01-02 PROCEDURE — 80061 LIPID PANEL: CPT

## 2023-01-02 PROCEDURE — 84443 ASSAY THYROID STIM HORMONE: CPT

## 2023-01-02 PROCEDURE — 36415 COLL VENOUS BLD VENIPUNCTURE: CPT

## 2023-01-02 RX ORDER — LEVOTHYROXINE SODIUM 50 UG/1
50 TABLET ORAL DAILY
Qty: 90 TABLET | Refills: 3 | Status: SHIPPED | OUTPATIENT
Start: 2023-01-02 | End: 2024-09-11

## 2023-01-02 NOTE — RESULT ENCOUNTER NOTE
The following letter pertains to your most recent diagnostic tests:    Your thyroid blood test TSH indicates that you are not getting enough thyroid medication (levothyroxine).  I recommend that we increase your levothyroxine dose from 25 mcg to 50 mcg daily and that we recheck your thyroid blood test (TSH) after you have been taking the new levothyroxine dose for 2 months.  You can schedule a lab appointment for that purpose.  I have sent an prescritpion for the new levothyroxine dose to your pharmacy.       Your cholesterol panel looks healthy with exception of mildly elevated triglycerides, but the triglycerides has improved since last check.      -Your prostate specific antigen (PSA) test result returned normal.         Follow up:  Lab appointment in 2 month to recheck thyroid function test       Sincerely,    Dr. Fonseca

## 2023-01-09 DIAGNOSIS — E78.5 HYPERLIPIDEMIA LDL GOAL <130: ICD-10-CM

## 2023-01-11 RX ORDER — ATORVASTATIN CALCIUM 20 MG/1
TABLET, FILM COATED ORAL
Qty: 90 TABLET | Refills: 2 | Status: SHIPPED | OUTPATIENT
Start: 2023-01-11 | End: 2023-10-09

## 2023-01-17 ENCOUNTER — TRANSFERRED RECORDS (OUTPATIENT)
Dept: HEALTH INFORMATION MANAGEMENT | Facility: CLINIC | Age: 73
End: 2023-01-17

## 2023-01-25 DIAGNOSIS — I10 BENIGN ESSENTIAL HYPERTENSION: ICD-10-CM

## 2023-01-26 RX ORDER — HYDROCHLOROTHIAZIDE 25 MG/1
TABLET ORAL
Qty: 90 TABLET | Refills: 3 | Status: SHIPPED | OUTPATIENT
Start: 2023-01-26 | End: 2024-09-09

## 2023-01-26 RX ORDER — LISINOPRIL 30 MG/1
TABLET ORAL
Qty: 90 TABLET | Refills: 3 | Status: SHIPPED | OUTPATIENT
Start: 2023-01-26 | End: 2024-01-02

## 2023-03-05 DIAGNOSIS — N40.1 BENIGN LOCALIZED PROSTATIC HYPERPLASIA WITH LOWER URINARY TRACT SYMPTOMS (LUTS): ICD-10-CM

## 2023-03-06 ENCOUNTER — TRANSFERRED RECORDS (OUTPATIENT)
Dept: HEALTH INFORMATION MANAGEMENT | Facility: CLINIC | Age: 73
End: 2023-03-06

## 2023-03-08 RX ORDER — TAMSULOSIN HYDROCHLORIDE 0.4 MG/1
CAPSULE ORAL
Qty: 90 CAPSULE | Refills: 2 | Status: SHIPPED | OUTPATIENT
Start: 2023-03-08 | End: 2024-01-02

## 2023-03-08 NOTE — TELEPHONE ENCOUNTER
TAMSULOSIN HCL 0.4 MG CAPSULE      Last Written Prescription Date:  12/8/22  Last Fill Quantity: 30,   # refills: 4  Last Office Visit : 12/6/22  Future Office visit:  none

## 2023-03-24 ENCOUNTER — MYC MEDICAL ADVICE (OUTPATIENT)
Dept: FAMILY MEDICINE | Facility: CLINIC | Age: 73
End: 2023-03-24
Payer: COMMERCIAL

## 2023-03-24 NOTE — TELEPHONE ENCOUNTER
Spoke to patient    Original request received 3/3/2023 asked for office visit notes, lab results, medication list and imaging results (NOT actual images).  That request was completed on 3/3/2023.    Request received from Dr Mccoy's office (in [place]) today 3/24/2023 for actual images.  Currently in process.    Called to patient to advise of above.  Patient appreciated phone call.    RT Shannan (R)

## 2023-04-11 ENCOUNTER — TELEPHONE (OUTPATIENT)
Dept: NEPHROLOGY | Facility: CLINIC | Age: 73
End: 2023-04-11
Payer: COMMERCIAL

## 2023-04-11 NOTE — TELEPHONE ENCOUNTER
Scheduled 2 month follow up appt from last visit in December on 6/16 at 9a with labs at 8:15. Lab orders needed. Routing to Neph nurses.    Sandra Watkins    Nephrology Clinic Navigator

## 2023-04-23 ENCOUNTER — TRANSFERRED RECORDS (OUTPATIENT)
Dept: HEALTH INFORMATION MANAGEMENT | Facility: CLINIC | Age: 73
End: 2023-04-23
Payer: COMMERCIAL

## 2023-05-16 ENCOUNTER — TRANSFERRED RECORDS (OUTPATIENT)
Dept: HEALTH INFORMATION MANAGEMENT | Facility: CLINIC | Age: 73
End: 2023-05-16
Payer: COMMERCIAL

## 2023-06-08 NOTE — ADDENDUM NOTE
Addended by: JAY SESAY on: 11/23/2022 05:24 PM     Modules accepted: Orders    
Addended by: JAY SESAY on: 11/23/2022 08:30 AM     Modules accepted: Orders    
Addended by: MICHAEL WATSON on: 11/21/2022 03:55 PM     Modules accepted: Orders, SmartSet    
rolling walker

## 2023-07-10 ENCOUNTER — LAB (OUTPATIENT)
Dept: LAB | Facility: CLINIC | Age: 73
End: 2023-07-10
Payer: COMMERCIAL

## 2023-07-10 DIAGNOSIS — N18.32 STAGE 3B CHRONIC KIDNEY DISEASE (H): ICD-10-CM

## 2023-07-10 DIAGNOSIS — E03.9 HYPOTHYROIDISM, UNSPECIFIED TYPE: ICD-10-CM

## 2023-07-10 LAB
CREAT UR-MCNC: 88.3 MG/DL
MICROALBUMIN UR-MCNC: 18.4 MG/L
MICROALBUMIN/CREAT UR: 20.84 MG/G CR (ref 0–17)
TSH SERPL DL<=0.005 MIU/L-ACNC: 2.6 UIU/ML (ref 0.3–4.2)

## 2023-07-10 PROCEDURE — 36415 COLL VENOUS BLD VENIPUNCTURE: CPT

## 2023-07-10 PROCEDURE — 82570 ASSAY OF URINE CREATININE: CPT

## 2023-07-10 PROCEDURE — 84443 ASSAY THYROID STIM HORMONE: CPT

## 2023-07-10 PROCEDURE — 82043 UR ALBUMIN QUANTITATIVE: CPT

## 2023-07-11 NOTE — RESULT ENCOUNTER NOTE
The following letter pertains to your most recent diagnostic tests:    -The urine protein test is essentially normal for you.  We can monitor this over time.     -TSH (thyroid stimulating hormone) level is normal which indicates normal circulating thyroid hormone levels.  Keep taking the levothyroxine as you are.        Sincerely,    Dr. Fonseca

## 2023-07-16 DIAGNOSIS — J45.40 MODERATE PERSISTENT ASTHMA WITHOUT COMPLICATION: ICD-10-CM

## 2023-07-17 RX ORDER — MOMETASONE FUROATE AND FORMOTEROL FUMARATE DIHYDRATE 200; 5 UG/1; UG/1
AEROSOL RESPIRATORY (INHALATION)
Qty: 39 G | Refills: 3 | Status: SHIPPED | OUTPATIENT
Start: 2023-07-17 | End: 2024-09-11

## 2023-07-27 DIAGNOSIS — F33.42 RECURRENT MAJOR DEPRESSIVE DISORDER, IN FULL REMISSION (H): ICD-10-CM

## 2023-07-28 RX ORDER — SERTRALINE HYDROCHLORIDE 100 MG/1
TABLET, FILM COATED ORAL
Qty: 180 TABLET | Refills: 3 | Status: SHIPPED | OUTPATIENT
Start: 2023-07-28 | End: 2024-09-11

## 2023-07-28 RX ORDER — BUPROPION HYDROCHLORIDE 150 MG/1
TABLET ORAL
Qty: 90 TABLET | Refills: 3 | Status: SHIPPED | OUTPATIENT
Start: 2023-07-28 | End: 2024-09-11

## 2023-08-23 ENCOUNTER — TRANSFERRED RECORDS (OUTPATIENT)
Dept: HEALTH INFORMATION MANAGEMENT | Facility: CLINIC | Age: 73
End: 2023-08-23
Payer: COMMERCIAL

## 2023-08-24 ENCOUNTER — TRANSFERRED RECORDS (OUTPATIENT)
Dept: HEALTH INFORMATION MANAGEMENT | Facility: CLINIC | Age: 73
End: 2023-08-24
Payer: COMMERCIAL

## 2023-08-31 NOTE — TELEPHONE ENCOUNTER
From: Terese Royal  To: Dinah Collado  Sent: 8/31/2023 8:47 AM CDT  Subject: Wart on finger    The area around the wart blistered. Dr Fair said that might happen. I'm keeping it covered most of the time so that I don't hurt it and I wanted to verify that otherwise I don't need to do anything. Thanks.    Left message for patient to call [phone] and select option 2 to speak to his care team.  Ashley Ludwig CMA

## 2023-10-03 ENCOUNTER — TELEPHONE (OUTPATIENT)
Dept: FAMILY MEDICINE | Facility: CLINIC | Age: 73
End: 2023-10-03
Payer: COMMERCIAL

## 2023-10-03 NOTE — TELEPHONE ENCOUNTER
Patient calling clinic reporting nasal symptoms have not improved yet since 10/1/2023 ED visit. Per patient, symptoms have not worsened and no new symptoms have occurred. Patient did receive abx while in ED and is currently on Day 3 of taking it. RN advised patient to continue taking abx as well as getting a nasal decongestant to help relieve nasal symptoms. RN also advised patient to call back if he begins to experience new or worsening symptoms. Patient agreed with plan of care and will continue to monitor symptoms.

## 2023-10-08 DIAGNOSIS — E78.5 HYPERLIPIDEMIA LDL GOAL <130: ICD-10-CM

## 2023-10-09 RX ORDER — ATORVASTATIN CALCIUM 20 MG/1
TABLET, FILM COATED ORAL
Qty: 90 TABLET | Refills: 0 | Status: SHIPPED | OUTPATIENT
Start: 2023-10-09 | End: 2024-09-11

## 2023-10-12 ENCOUNTER — NURSE TRIAGE (OUTPATIENT)
Dept: FAMILY MEDICINE | Facility: CLINIC | Age: 73
End: 2023-10-12
Payer: COMMERCIAL

## 2023-10-12 NOTE — TELEPHONE ENCOUNTER
"Nurse Triage SBAR    Is this a 2nd Level Triage? YES, LICENSED PRACTITIONER REVIEW IS REQUIRED    Situation: pt still having ear pain and sinus congestion after antibiotics. Triaged to see PCP within 24 hours; routing schedule request to [facility]: ok to schedule in \"approval required\" 0900 10/13/23?      Background: please see ED notes 10/1    Assessment: MD review/recommend    Protocol Recommended Disposition:   See PCP Within 24 Hours    Recommendation: answer callback from clinic     Routed to provider    Does the patient meet one of the following criteria for ADS visit consideration? 16+ years old, with an [facility] PCP     TIP  Providers, please consider if this condition is appropriate for management at one of our Acute and Diagnostic Services sites.     If patient is a good candidate, please use dotphrase <dot>triageresponse and select Refer to ADS to document.      1. ANTIBIOTIC: \"What antibiotic are you taking?\" \"How many times per day?\"      Ordered Prescriptions  Prescription Sig Dispensed Refills Start Date End Date   amoxicillin (AMOXIL) 500 mg tablet   Indications: Acute left otitis media Take 1 Tablet (500 mg) by mouth two times daily for 10 days.         2. ONSET: \"When was the antibiotic started?\"      10/1/23  3. LOCATION: \"Which ear is involved?\"      Left   4. PAIN: \"How bad is the pain?\"   (Scale 1-10; mild, moderate or severe)    - MILD (1-3): doesn't interfere with normal activities     - MODERATE (4-7): interferes with normal activities or awakens from sleep     - SEVERE (8-10): excruciating pain, unable to do any normal activities       Mild in sinuses   5. FEVER: \"Do you have a fever?\" If Yes, ask: \"What is your temperature, how was it measured, and when did it start?\"      Denies   6. DISCHARGE: \"Is there any discharge from the ear?\"      No discharge   7. OTHER SYMPTOMS: \"Do you have any other symptoms?\" (e.g., headache, stiff neck, dizziness, vomiting, runny nose)      Stiff neck neck, " headache, pt sounds congested    Reason for Disposition   [1] Taking antibiotic > 72 hours (3 days) and [2] pain persists or recurs    Additional Information   Negative: [1] Stiff neck (can't touch chin to chest) AND [2] fever   Negative: [1] Bony area of skull behind the ear is pink or swollen AND [2] fever   Negative: Fever > 104 F (40 C)   Negative: Patient sounds very sick or weak to the triager   Negative: [1] SEVERE pain and [2] not improved 2 hours after analgesic medication (e.g., ibuprofen or acetaminophen)   Negative: Walking is very unsteady or feels very dizzy   Negative: [1] Vomited AND [2] 3 or more times    Protocols used: Ear - Otitis Media Follow-up Call-A-

## 2023-10-13 ENCOUNTER — OFFICE VISIT (OUTPATIENT)
Dept: URGENT CARE | Facility: URGENT CARE | Age: 73
End: 2023-10-13
Payer: COMMERCIAL

## 2023-10-13 VITALS
HEART RATE: 66 BPM | SYSTOLIC BLOOD PRESSURE: 179 MMHG | OXYGEN SATURATION: 96 % | DIASTOLIC BLOOD PRESSURE: 82 MMHG | TEMPERATURE: 98.2 F

## 2023-10-13 DIAGNOSIS — J01.90 ACUTE SINUSITIS WITH SYMPTOMS > 10 DAYS: Primary | ICD-10-CM

## 2023-10-13 DIAGNOSIS — J01.01 ACUTE RECURRENT MAXILLARY SINUSITIS: ICD-10-CM

## 2023-10-13 PROCEDURE — 99213 OFFICE O/P EST LOW 20 MIN: CPT

## 2023-10-13 RX ORDER — DOXYCYCLINE HYCLATE 100 MG
100 TABLET ORAL 2 TIMES DAILY
Qty: 14 TABLET | Refills: 0 | Status: SHIPPED | OUTPATIENT
Start: 2023-10-13 | End: 2023-10-20

## 2023-10-13 RX ORDER — FLUTICASONE PROPIONATE 50 MCG
1 SPRAY, SUSPENSION (ML) NASAL DAILY
Qty: 16 G | Refills: 0 | Status: SHIPPED | OUTPATIENT
Start: 2023-10-13 | End: 2024-09-09

## 2023-10-13 ASSESSMENT — ENCOUNTER SYMPTOMS
SINUS PAIN: 1
FEVER: 0
SINUS PRESSURE: 1
RHINORRHEA: 1
COUGH: 0

## 2023-10-13 NOTE — TELEPHONE ENCOUNTER
Patient Contact    Attempt # 1    Was call answered?  No.  Left message on voicemail with information to call clinic back.    On callback, please relay provider's message below and schedule if possible.    Sheryl Rivera RN  [facility]

## 2023-10-13 NOTE — PROGRESS NOTES
Assessment & Plan:        ICD-10-CM    1. Acute sinusitis with symptoms > 10 days  J01.90 doxycycline hyclate (VIBRA-TABS) 100 MG tablet     fluticasone (FLONASE) 50 MCG/ACT nasal spray      2. Acute recurrent maxillary sinusitis  J01.01 doxycycline hyclate (VIBRA-TABS) 100 MG tablet     fluticasone (FLONASE) 50 MCG/ACT nasal spray            Plan/Clinical Decision Making:    Patient with recently treated OM with amoxicillin. Improved OM, but still having persistent discolored nasal drainage with maxillary sinus tenderness.   Will start with course of doxycycline.   Use Flonase daily.     Patient Instructions   Use steroid nasal spray- Flonase daily in each nostril.     Take course of antibiotics.       Return if symptoms worsen or fail to improve, for in 5-7 days.     At the end of the encounter, I discussed results, diagnosis, medications. Discussed red flags for immediate return to clinic/ER, as well as indications for follow up if no improvement. Patient understood and agreed to plan. Patient was stable for discharge.        Daisha Cody PA-C on 10/13/2023 at 10:10 AM          Subjective:     HPI:    Andres is a 73 year old male who presents to clinic today for the following health issues:  Chief Complaint   Patient presents with    Ear Plugs     Bilateral, x 2-3 weeks    Sinus Problem     Using krishan pot daily      HPI    Bilateral plugged ears for several weeks.   Seen in Urgent Care on 10/1/23 for Left OM and sinusitis.   Treated with Amoxicillin 500mg bid for 10 days.   Still having yellowish sinus drainage, but still plugged ears. Pain in ear resolved.     Didn't take BP medications this morning.     GFR: 53    Review of Systems   Constitutional:  Negative for fever.   HENT:  Positive for congestion, rhinorrhea, sinus pressure and sinus pain.    Respiratory:  Negative for cough.          Patient Active Problem List   Diagnosis    BPH (benign prostatic hyperplasia)    Hematuria    Benign essential  hypertension    Seasonal allergic rhinitis    Moderate persistent asthma without complication    Vasculogenic erectile dysfunction    Herpes zoster without complication    Recurrent major depressive disorder, in full remission (H24)    Non morbid obesity due to excess calories    Hyperlipidemia LDL goal <70    IFG (impaired fasting glucose)    Cryptogenic stroke (H)    Memory loss    Vitamin B12 deficiency (non anemic)    Borderline abnormal TFTs    Hypothyroidism, unspecified type    Stage 3b chronic kidney disease (H)    MGUS (monoclonal gammopathy of unknown significance)        Past Medical History:   Diagnosis Date    Arthritis     BPH (benign prostatic hyperplasia)     Cryptogenic stroke (H) 08/16/2022    Depression     Depressive disorder 2005    History of spinal cord injury     Hypertension     Mumps     Non morbid obesity due to excess calories 11/09/2016    Recurrent major depressive disorder, in full remission (H24) 11/09/2016    Thyroid disease     Uncomplicated asthma        Social History     Tobacco Use    Smoking status: Never    Smokeless tobacco: Never   Substance Use Topics    Alcohol use: Yes     Alcohol/week: 3.0 - 4.0 standard drinks of alcohol     Comment: 3 drinks on weekend days             Objective:     Vitals:    10/13/23 0952 10/13/23 0954   BP: (!) 183/74 (!) 179/82   BP Location: Left arm Right arm   Patient Position: Sitting Sitting   Cuff Size: Adult Large Adult Regular   Pulse: 66    Temp: 98.2  F (36.8  C)    TempSrc: Tympanic    SpO2: 96%          Physical Exam   EXAM:   Pleasant, alert, appropriate appearance. NAD.  Head Exam: Normocephalic, atraumatic.  Eye Exam:  non icteric/injection.    Ear Exam: TMs grey without bulging. Normal canals.  Normal pinna.  Nose Exam: Normal external nose.  Bilateral maxillary sinus tenderness  OroPharynx Exam:  Moist mucous membranes. No erythema, pharynx without exudate or hypertrophy.  Neck/Thyroid Exam:  No LAD.    Chest/Respiratory Exam:  CTAB.    Results:  No results found for any visits on 10/13/23.

## 2023-10-15 DIAGNOSIS — E03.8 SUBCLINICAL HYPOTHYROIDISM: ICD-10-CM

## 2023-10-16 NOTE — TELEPHONE ENCOUNTER
Per chart review, patient was seen on 10/13/23 in [facility] for symptoms.    Closing encounter.    Georgina Ma RN  [facility]

## 2023-10-17 RX ORDER — LEVOTHYROXINE SODIUM 50 UG/1
50 TABLET ORAL DAILY
Qty: 90 TABLET | Refills: 3 | OUTPATIENT
Start: 2023-10-17

## 2023-12-05 ENCOUNTER — TRANSFERRED RECORDS (OUTPATIENT)
Dept: HEALTH INFORMATION MANAGEMENT | Facility: CLINIC | Age: 73
End: 2023-12-05

## 2023-12-11 ENCOUNTER — DOCUMENTATION ONLY (OUTPATIENT)
Dept: FAMILY MEDICINE | Facility: CLINIC | Age: 73
End: 2023-12-11
Payer: COMMERCIAL

## 2023-12-11 DIAGNOSIS — Z00.00 ROUTINE GENERAL MEDICAL EXAMINATION AT A HEALTH CARE FACILITY: ICD-10-CM

## 2023-12-11 DIAGNOSIS — Z12.5 SCREENING FOR PROSTATE CANCER: Primary | ICD-10-CM

## 2023-12-11 NOTE — PROGRESS NOTES
Dionte Mackye has an upcoming lab appointment:    Future Appointments   Date Time Provider Department Center   12/28/2023  7:30 AM [facility]    1/2/2024 10:30 AM [facility]     Patient is scheduled for the following lab(s): Patient is requesting labs. Please order if necessary, thank you.      There is no order available. Please review and place either future orders or HMPO (Review of Health Maintenance Protocol Orders), as appropriate.    Health Maintenance Due   Topic    ANNUAL REVIEW OF HM ORDERS     HEMOGLOBIN     BMP     LIPID     MICROALBUMIN      Aixa Morales

## 2023-12-14 ENCOUNTER — TRANSFERRED RECORDS (OUTPATIENT)
Dept: HEALTH INFORMATION MANAGEMENT | Facility: CLINIC | Age: 73
End: 2023-12-14
Payer: COMMERCIAL

## 2023-12-28 ENCOUNTER — NON-VISIT BILLABLE ENCOUNTER (OUTPATIENT)
Dept: FAMILY MEDICINE | Facility: CLINIC | Age: 73
End: 2023-12-28

## 2023-12-28 ENCOUNTER — OFFICE VISIT (OUTPATIENT)
Dept: URGENT CARE | Facility: URGENT CARE | Age: 73
End: 2023-12-28
Payer: COMMERCIAL

## 2023-12-28 ENCOUNTER — TELEPHONE (OUTPATIENT)
Dept: FAMILY MEDICINE | Facility: CLINIC | Age: 73
End: 2023-12-28

## 2023-12-28 ENCOUNTER — LAB (OUTPATIENT)
Dept: LAB | Facility: CLINIC | Age: 73
End: 2023-12-28
Payer: COMMERCIAL

## 2023-12-28 VITALS
WEIGHT: 181 LBS | HEART RATE: 68 BPM | BODY MASS INDEX: 26.73 KG/M2 | TEMPERATURE: 98.1 F | RESPIRATION RATE: 16 BRPM | OXYGEN SATURATION: 96 % | DIASTOLIC BLOOD PRESSURE: 69 MMHG | SYSTOLIC BLOOD PRESSURE: 149 MMHG

## 2023-12-28 DIAGNOSIS — Z01.818 PRE-OP EXAM: Primary | ICD-10-CM

## 2023-12-28 DIAGNOSIS — Z01.818 PRE-OP EXAM: ICD-10-CM

## 2023-12-28 DIAGNOSIS — H65.93 BILATERAL NON-SUPPURATIVE OTITIS MEDIA: ICD-10-CM

## 2023-12-28 DIAGNOSIS — J01.90 ACUTE NON-RECURRENT SINUSITIS, UNSPECIFIED LOCATION: Primary | ICD-10-CM

## 2023-12-28 DIAGNOSIS — D47.2 MGUS (MONOCLONAL GAMMOPATHY OF UNKNOWN SIGNIFICANCE): ICD-10-CM

## 2023-12-28 DIAGNOSIS — Z00.00 ROUTINE GENERAL MEDICAL EXAMINATION AT A HEALTH CARE FACILITY: ICD-10-CM

## 2023-12-28 DIAGNOSIS — Z12.5 SCREENING FOR PROSTATE CANCER: ICD-10-CM

## 2023-12-28 LAB
ALBUMIN SERPL BCG-MCNC: 4.2 G/DL (ref 3.5–5.2)
ALP SERPL-CCNC: 92 U/L (ref 40–150)
ALT SERPL W P-5'-P-CCNC: 27 U/L (ref 0–70)
ANION GAP SERPL CALCULATED.3IONS-SCNC: 11 MMOL/L (ref 7–15)
APTT PPP: 41 SECONDS (ref 22–38)
AST SERPL W P-5'-P-CCNC: 26 U/L (ref 0–45)
BILIRUB SERPL-MCNC: 0.3 MG/DL
BUN SERPL-MCNC: 20.1 MG/DL (ref 8–23)
CALCIUM SERPL-MCNC: 9 MG/DL (ref 8.8–10.2)
CHLORIDE SERPL-SCNC: 105 MMOL/L (ref 98–107)
CHOLEST SERPL-MCNC: 136 MG/DL
CREAT SERPL-MCNC: 1.59 MG/DL (ref 0.67–1.17)
DEPRECATED HCO3 PLAS-SCNC: 20 MMOL/L (ref 22–29)
EGFRCR SERPLBLD CKD-EPI 2021: 46 ML/MIN/1.73M2
ERYTHROCYTE [DISTWIDTH] IN BLOOD BY AUTOMATED COUNT: 13.1 % (ref 10–15)
FASTING STATUS PATIENT QL REPORTED: YES
GLUCOSE SERPL-MCNC: 116 MG/DL (ref 70–99)
HCT VFR BLD AUTO: 41 % (ref 40–53)
HDLC SERPL-MCNC: 47 MG/DL
HGB BLD-MCNC: 13.1 G/DL (ref 13.3–17.7)
HOLD SPECIMEN: NORMAL
HOLD SPECIMEN: NORMAL
INR PPP: 1.04 (ref 0.85–1.15)
LDLC SERPL CALC-MCNC: 65 MG/DL
MCH RBC QN AUTO: 30.1 PG (ref 26.5–33)
MCHC RBC AUTO-ENTMCNC: 32 G/DL (ref 31.5–36.5)
MCV RBC AUTO: 94 FL (ref 78–100)
NONHDLC SERPL-MCNC: 89 MG/DL
PLATELET # BLD AUTO: 317 10E3/UL (ref 150–450)
POTASSIUM SERPL-SCNC: 4.5 MMOL/L (ref 3.4–5.3)
PROT SERPL-MCNC: 7.1 G/DL (ref 6.4–8.3)
PSA SERPL DL<=0.01 NG/ML-MCNC: 3.49 NG/ML (ref 0–6.5)
RBC # BLD AUTO: 4.35 10E6/UL (ref 4.4–5.9)
SODIUM SERPL-SCNC: 136 MMOL/L (ref 135–145)
TRIGL SERPL-MCNC: 121 MG/DL
TSH SERPL DL<=0.005 MIU/L-ACNC: 3.83 UIU/ML (ref 0.3–4.2)
WBC # BLD AUTO: 9.1 10E3/UL (ref 4–11)

## 2023-12-28 PROCEDURE — 80061 LIPID PANEL: CPT

## 2023-12-28 PROCEDURE — 85730 THROMBOPLASTIN TIME PARTIAL: CPT

## 2023-12-28 PROCEDURE — 80053 COMPREHEN METABOLIC PANEL: CPT

## 2023-12-28 PROCEDURE — 36415 COLL VENOUS BLD VENIPUNCTURE: CPT

## 2023-12-28 PROCEDURE — 85027 COMPLETE CBC AUTOMATED: CPT

## 2023-12-28 PROCEDURE — 85610 PROTHROMBIN TIME: CPT

## 2023-12-28 PROCEDURE — 84443 ASSAY THYROID STIM HORMONE: CPT

## 2023-12-28 PROCEDURE — 99213 OFFICE O/P EST LOW 20 MIN: CPT | Performed by: PHYSICIAN ASSISTANT

## 2023-12-28 PROCEDURE — G0103 PSA SCREENING: HCPCS

## 2023-12-28 RX ORDER — TAMSULOSIN HYDROCHLORIDE 0.4 MG/1
0.4 CAPSULE ORAL DAILY
Qty: 90 CAPSULE | OUTPATIENT
Start: 2023-12-28

## 2023-12-28 ASSESSMENT — ENCOUNTER SYMPTOMS
FEVER: 0
SORE THROAT: 0
SINUS PRESSURE: 1
RHINORRHEA: 1
COUGH: 1
MYALGIAS: 0
HEADACHES: 0

## 2023-12-28 NOTE — TELEPHONE ENCOUNTER
General Call    Contacts         Type Contact Phone/Fax    12/28/2023 07:36 AM CST Phone (Incoming) Andres Mackey (Self) [phone] (M)          Reason for Call:  Lab orders needed    What are your questions or concerns:  Andres called because there were no orders in the system for the labs he had drawn today. He said he needed bt/INR and ptt. The blood was drawn. He is having surgery soon.     Date of last appointment with provider:     Could we send this information to you in [facility] or would you prefer to receive a phone call?:   No preference   Okay to leave a detailed message?: No at N/A

## 2023-12-28 NOTE — PROGRESS NOTES
SUBJECTIVE:   Dionte Mackey is a 73 year old male presenting with a chief complaint of   Chief Complaint   Patient presents with    Urgent Care     Pt reports bilateral ears feel plug - Left ear infection in Oct. Productive cough and sinus pressure x 5 days       He is an established patient of [facility].  Patient presents with nasal discharge x 9 days.  Bilateral pain.      Treatment:  sinus flush, mucinex  No HEENT surgeries.      Review of Systems   Constitutional:  Negative for fever.   HENT:  Positive for congestion, ear pain, postnasal drip, rhinorrhea and sinus pressure. Negative for sore throat.    Respiratory:  Positive for cough.    Musculoskeletal:  Negative for myalgias.   Neurological:  Negative for headaches.   All other systems reviewed and are negative.      Past Medical History:   Diagnosis Date    Arthritis     BPH (benign prostatic hyperplasia)     Cryptogenic stroke (H) 08/16/2022    Depression     Depressive disorder 2005    History of spinal cord injury     Hypertension     Mumps     Non morbid obesity due to excess calories 11/09/2016    Recurrent major depressive disorder, in full remission (H24) 11/09/2016    Thyroid disease     Uncomplicated asthma      Family History   Problem Relation Age of Onset    Colon Cancer Father 70    Coronary Artery Disease No family hx of     Cerebrovascular Disease No family hx of     Diabetes No family hx of      Current Outpatient Medications   Medication Sig Dispense Refill    albuterol (PROAIR HFA/PROVENTIL HFA/VENTOLIN HFA) 108 (90 Base) MCG/ACT inhaler USE 2 INHALATIONS EVERY 6 HOURS 8.5 g 11    amoxicillin-clavulanate (AUGMENTIN) 875-125 MG tablet Take 1 tablet by mouth 2 times daily for 7 days 14 tablet 0    aspirin (ASA) 81 MG EC tablet Take 1 tablet (81 mg) by mouth daily      atorvastatin (LIPITOR) 20 MG tablet TAKE 1 TABLET DAILY (SCHEDULE APPOINTMENT TO DISCUSS FOR FURTHER REFILLS ) 90 tablet 0    buPROPion (WELLBUTRIN XL) 150 MG 24 hr  tablet TAKE 1 TABLET EVERY MORNING 90 tablet 3    DULERA 200-5 MCG/ACT inhaler USE 2 INHALATIONS TWICE A DAY (DUE FOR A FOLLOW UP IN CLINIC, CALL [phone] TO SCHEDULE) 39 g 3    fluticasone (FLONASE) 50 MCG/ACT nasal spray Spray 1 spray into both nostrils daily 16 g 0    hydrochlorothiazide (HYDRODIURIL) 25 MG tablet TAKE 1 TABLET DAILY 90 tablet 3    levothyroxine (SYNTHROID/LEVOTHROID) 50 MCG tablet Take 1 tablet (50 mcg) by mouth daily 90 tablet 3    lisinopril (ZESTRIL) 30 MG tablet TAKE 1 TABLET DAILY 90 tablet 3    oxybutynin ER (DITROPAN-XL) 5 MG 24 hr tablet TAKE 1 TABLET DAILY 30 tablet 0    sertraline (ZOLOFT) 100 MG tablet TAKE 1 TABLET TWICE A  tablet 3    tamsulosin (FLOMAX) 0.4 MG capsule TAKE 1 CAPSULE BY MOUTH EVERY DAY 90 capsule 2    tamsulosin (FLOMAX) 0.4 MG capsule TAKE 1 CAPSULE BY MOUTH EVERY DAY 90 capsule 1    valACYclovir (VALTREX) 1000 mg tablet Take 1 tablet (1,000 mg) by mouth 2 times daily 4 tablet 11     Social History     Tobacco Use    Smoking status: Never    Smokeless tobacco: Never   Substance Use Topics    Alcohol use: Yes     Alcohol/week: 3.0 - 4.0 standard drinks of alcohol     Comment: 3 drinks on weekend days       OBJECTIVE  BP (!) 149/69 (BP Location: Left arm, Patient Position: Sitting, Cuff Size: Adult Large)   Pulse 68   Temp 98.1  F (36.7  C) (Tympanic)   Resp 16   Wt 82.1 kg (181 lb)   SpO2 96%   BMI 26.73 kg/m      Physical Exam  Vitals and nursing note reviewed.   Constitutional:       Appearance: Normal appearance. He is normal weight.   HENT:      Head: Normocephalic and atraumatic.      Right Ear: Ear canal and external ear normal.      Left Ear: Ear canal and external ear normal.      Ears:      Comments: Bilateral TM erythema and bulging, white fluid behind TM     Nose: Nose normal.      Mouth/Throat:      Mouth: Mucous membranes are moist.      Pharynx: Oropharynx is clear.   Eyes:      Extraocular Movements: Extraocular movements intact.       Conjunctiva/sclera: Conjunctivae normal.   Cardiovascular:      Rate and Rhythm: Normal rate and regular rhythm.      Pulses: Normal pulses.      Heart sounds: Normal heart sounds.   Pulmonary:      Effort: Pulmonary effort is normal.      Breath sounds: Normal breath sounds.   Musculoskeletal:      Cervical back: Normal range of motion and neck supple. No rigidity. No muscular tenderness.   Skin:     General: Skin is warm and dry.   Neurological:      General: No focal deficit present.      Mental Status: He is alert.   Psychiatric:         Mood and Affect: Mood normal.         Behavior: Behavior normal.         Labs:  Results for orders placed or performed in visit on 12/28/23 (from the past 24 hour(s))   CBC with platelets   Result Value Ref Range    WBC Count 9.1 4.0 - 11.0 10e3/uL    RBC Count 4.35 (L) 4.40 - 5.90 10e6/uL    Hemoglobin 13.1 (L) 13.3 - 17.7 g/dL    Hematocrit 41.0 40.0 - 53.0 %    MCV 94 78 - 100 fL    MCH 30.1 26.5 - 33.0 pg    MCHC 32.0 31.5 - 36.5 g/dL    RDW 13.1 10.0 - 15.0 %    Platelet Count 317 150 - 450 10e3/uL   Extra Tube    Narrative    The following orders were created for panel order Extra Tube.  Procedure                               Abnormality         Status                     ---------                               -----------         ------                     Extra Blue Top Tube[[id number]]                              Final result               Extra Blue Top Tube[[id number]]                              Final result                 Please view results for these tests on the individual orders.   Extra Blue Top Tube   Result Value Ref Range    Hold Specimen JIC    Extra Blue Top Tube   Result Value Ref Range    Hold Specimen JIC        ASSESSMENT:      ICD-10-CM    1. Acute non-recurrent sinusitis, unspecified location  J01.90       2. Bilateral non-suppurative otitis media  H65.93 amoxicillin-clavulanate (AUGMENTIN) 875-125 MG tablet           Medical Decision  Making:    Differential Diagnosis:  URI Adult/Peds:  Acute right otitis media, Acute left otitis media, and Sinusitis    Serious Comorbid Conditions:  Adult:   reviewed    PLAN:    Rx for augmentin.  Can continue with mucinex.  Discussed reasons to seek immediate medical attention.  Additionally if no improvement or worsening in one week, may follow up with PCP and/or UC.        Followup:    If not improving or if condition worsens, follow up with your Primary Care Provider, If not improving or if conditions worsens over the next 12-24 hours, go to the Emergency Department    There are no Patient Instructions on file for this visit.

## 2023-12-28 NOTE — TELEPHONE ENCOUNTER
Called pt to schedule an appt with Dr Eugene for this medication refill and pt said he will have his PCP, Dr Fonseca, refill the medication instead.

## 2024-01-02 ENCOUNTER — OFFICE VISIT (OUTPATIENT)
Dept: FAMILY MEDICINE | Facility: CLINIC | Age: 74
End: 2024-01-02
Payer: COMMERCIAL

## 2024-01-02 VITALS
HEART RATE: 82 BPM | DIASTOLIC BLOOD PRESSURE: 75 MMHG | SYSTOLIC BLOOD PRESSURE: 155 MMHG | HEIGHT: 69 IN | OXYGEN SATURATION: 97 % | WEIGHT: 187 LBS | BODY MASS INDEX: 27.7 KG/M2 | TEMPERATURE: 98.1 F | RESPIRATION RATE: 16 BRPM

## 2024-01-02 DIAGNOSIS — I10 BENIGN ESSENTIAL HYPERTENSION: ICD-10-CM

## 2024-01-02 DIAGNOSIS — N18.32 STAGE 3B CHRONIC KIDNEY DISEASE (H): ICD-10-CM

## 2024-01-02 DIAGNOSIS — Z23 NEED FOR VACCINATION: ICD-10-CM

## 2024-01-02 DIAGNOSIS — N40.1 BENIGN PROSTATIC HYPERPLASIA WITH LOWER URINARY TRACT SYMPTOMS, SYMPTOM DETAILS UNSPECIFIED: ICD-10-CM

## 2024-01-02 DIAGNOSIS — R97.20 ELEVATED PROSTATE SPECIFIC ANTIGEN (PSA): ICD-10-CM

## 2024-01-02 DIAGNOSIS — E03.9 HYPOTHYROIDISM, UNSPECIFIED TYPE: ICD-10-CM

## 2024-01-02 DIAGNOSIS — M16.12 PRIMARY OSTEOARTHRITIS OF LEFT HIP: ICD-10-CM

## 2024-01-02 DIAGNOSIS — Z00.00 ENCOUNTER FOR MEDICARE ANNUAL WELLNESS EXAM: Primary | ICD-10-CM

## 2024-01-02 DIAGNOSIS — Z01.818 PREOP GENERAL PHYSICAL EXAM: ICD-10-CM

## 2024-01-02 DIAGNOSIS — J45.40 MODERATE PERSISTENT ASTHMA WITHOUT COMPLICATION: ICD-10-CM

## 2024-01-02 DIAGNOSIS — F33.42 RECURRENT MAJOR DEPRESSIVE DISORDER, IN FULL REMISSION (H): ICD-10-CM

## 2024-01-02 DIAGNOSIS — E78.5 HYPERLIPIDEMIA LDL GOAL <70: ICD-10-CM

## 2024-01-02 DIAGNOSIS — Z23 NEED FOR TDAP VACCINATION: ICD-10-CM

## 2024-01-02 PROCEDURE — G0439 PPPS, SUBSEQ VISIT: HCPCS | Performed by: INTERNAL MEDICINE

## 2024-01-02 PROCEDURE — 90715 TDAP VACCINE 7 YRS/> IM: CPT | Performed by: INTERNAL MEDICINE

## 2024-01-02 PROCEDURE — 99214 OFFICE O/P EST MOD 30 MIN: CPT | Mod: 25 | Performed by: INTERNAL MEDICINE

## 2024-01-02 PROCEDURE — 93000 ELECTROCARDIOGRAM COMPLETE: CPT | Performed by: INTERNAL MEDICINE

## 2024-01-02 PROCEDURE — 90471 IMMUNIZATION ADMIN: CPT | Performed by: INTERNAL MEDICINE

## 2024-01-02 RX ORDER — AMLODIPINE BESYLATE 5 MG/1
5 TABLET ORAL DAILY
Qty: 90 TABLET | Refills: 3 | Status: SHIPPED | OUTPATIENT
Start: 2024-01-02 | End: 2024-09-11

## 2024-01-02 RX ORDER — RESPIRATORY SYNCYTIAL VIRUS VACCINE 120MCG/0.5
0.5 KIT INTRAMUSCULAR ONCE
Qty: 1 EACH | Refills: 0 | Status: CANCELLED | OUTPATIENT
Start: 2024-01-02 | End: 2024-01-02

## 2024-01-02 RX ORDER — MUPIROCIN 20 MG/G
OINTMENT TOPICAL
COMMUNITY
Start: 2023-12-05

## 2024-01-02 RX ORDER — FINASTERIDE 5 MG/1
5 TABLET, FILM COATED ORAL DAILY
COMMUNITY
Start: 2022-10-28 | End: 2024-09-09

## 2024-01-02 RX ORDER — LISINOPRIL 10 MG/1
10 TABLET ORAL DAILY
COMMUNITY
End: 2024-09-11

## 2024-01-02 RX ORDER — VITAMIN E 268 MG
180 CAPSULE ORAL DAILY
COMMUNITY

## 2024-01-02 RX ORDER — MULTIPLE VITAMINS W/ MINERALS TAB 9MG-400MCG
1 TAB ORAL DAILY
COMMUNITY

## 2024-01-02 RX ORDER — LANOLIN ALCOHOL/MO/W.PET/CERES
1000 CREAM (GRAM) TOPICAL
COMMUNITY

## 2024-01-02 ASSESSMENT — PATIENT HEALTH QUESTIONNAIRE - PHQ9
SUM OF ALL RESPONSES TO PHQ QUESTIONS 1-9: 0
10. IF YOU CHECKED OFF ANY PROBLEMS, HOW DIFFICULT HAVE THESE PROBLEMS MADE IT FOR YOU TO DO YOUR WORK, TAKE CARE OF THINGS AT HOME, OR GET ALONG WITH OTHER PEOPLE: NOT DIFFICULT AT ALL
SUM OF ALL RESPONSES TO PHQ QUESTIONS 1-9: 0

## 2024-01-02 ASSESSMENT — ENCOUNTER SYMPTOMS
JOINT SWELLING: 0
ARTHRALGIAS: 0
HEARTBURN: 0
HEADACHES: 0
FEVER: 0
HEMATOCHEZIA: 0
DIZZINESS: 0
SORE THROAT: 0
FREQUENCY: 1
DYSURIA: 0
EYE PAIN: 0
CONSTIPATION: 0
COUGH: 0
PALPITATIONS: 0
PARESTHESIAS: 0
SHORTNESS OF BREATH: 0
ABDOMINAL PAIN: 0
HEMATURIA: 0
NERVOUS/ANXIOUS: 0
WEAKNESS: 0
MYALGIAS: 0
DIARRHEA: 0
NAUSEA: 0
CHILLS: 0

## 2024-01-02 ASSESSMENT — PAIN SCALES - GENERAL: PAINLEVEL: NO PAIN (0)

## 2024-01-02 ASSESSMENT — ACTIVITIES OF DAILY LIVING (ADL): CURRENT_FUNCTION: NO ASSISTANCE NEEDED

## 2024-01-02 ASSESSMENT — ASTHMA QUESTIONNAIRES: ACT_TOTALSCORE: 25

## 2024-01-02 NOTE — RESULT ENCOUNTER NOTE
The following letter pertains to your most recent diagnostic tests:    EKG shows NSR rate 64 no acute ST changes V1 with motion artifact     OK for surgery.    Sincerely,    Dr. Fonseca

## 2024-01-02 NOTE — PROGRESS NOTES
"SUBJECTIVE:   Andres is a 73 year old, presenting for the following:  Physical        Are you in the first 12 months of your Medicare coverage?  No    Healthy Habits:     In general, how would you rate your overall health?  Good    Frequency of exercise:  2-3 days/week    Duration of exercise:  15-30 minutes    Do you usually eat at least 4 servings of fruit and vegetables a day, include whole grains    & fiber and avoid regularly eating high fat or \"junk\" foods?  Yes    Taking medications regularly:  Yes    Medication side effects:  None    Ability to successfully perform activities of daily living:  No assistance needed    Home Safety:  No safety concerns identified    Hearing Impairment:  Difficulty following a conversation in a noisy restaurant or crowded room, find that men's voices are easier to understand than woman's and difficulty understanding soft or whispered speech    In the past 6 months, have you been bothered by leaking of urine? Yes    In general, how would you rate your overall mental or emotional health?  Excellent    Additional concerns today:  No    Scheduled for right hip replacement on 1/22/24  This patient reports being able to perform 4 METS of physical activity without chest pain or dyspnea.     Today's PHQ-9 Score:       1/2/2024     8:02 AM   PHQ-9 SCORE   PHQ-9 Total Score MyChart 0   PHQ-9 Total Score 0    0           Have you ever done Advance Care Planning? (For example, a Health Directive, POLST, or a discussion with a medical provider or your loved ones about your wishes):        Fall risk  Fallen 2 or more times in the past year?: No  Any fall with injury in the past year?: No    Cognitive Screening   1) Repeat 3 items (Leader, Season, Table)    2) Clock draw: NORMAL  3) 3 item recall: Recalls 2 objects   Results: NORMAL clock, 1-2 items recalled: COGNITIVE IMPAIRMENT LESS LIKELY    Mini-CogTM Copyright S Maria Isabel. Licensed by the author for use in [facility]; reprinted " with permission ([email or web address]@[email or web address]). All rights reserved.      Do you have sleep apnea, excessive snoring or daytime drowsiness? : no    Reviewed and updated as needed this visit by clinical staff   Tobacco  Allergies  Meds              Reviewed and updated as needed this visit by Provider                 Social History     Tobacco Use     Smoking status: Never     Smokeless tobacco: Never   Substance Use Topics     Alcohol use: Yes     Alcohol/week: 3.0 - 4.0 standard drinks of alcohol     Comment: 3 drinks on weekend days             1/2/2024    10:17 AM   Alcohol Use   Prescreen: >3 drinks/day or >7 drinks/week? No     Do you have a current opioid prescription?   Do you use any other controlled substances or medications that are not prescribed by a provider?               Current providers sharing in care for this patient include:   Patient Care Team:  Romeo Fonseca MD as PCP - General (Internal Medicine)  Romeo Fonseca MD as Assigned PCP  Wisam Eugene MD as MD (Urology)  Jhonny Bhatti MD as MD (Nephrology)  Wisam Eugene MD as Assigned Surgical Provider  Jhonny Bhatti MD as Assigned Nephrology Provider  Nolberto Up MD as MD (Hematology & Oncology)    The following health maintenance items are reviewed in Epic and correct as of today:  Health Maintenance   Topic Date Due     DEPRESSION ACTION PLAN  Never done     RSV VACCINE (Pregnancy & 60+) (1 - 1-dose 60+ series) Never done     ASTHMA ACTION PLAN  09/13/2018     ANNUAL REVIEW OF HM ORDERS  07/25/2023     DTAP/TDAP/TD IMMUNIZATION (3 - Td or Tdap) 08/18/2023     COVID-19 Vaccine (6 - 2023-24 season) 09/01/2023     MEDICARE ANNUAL WELLNESS VISIT  12/29/2023     MICROALBUMIN  01/10/2024     ASTHMA CONTROL TEST  07/02/2024     PHQ-9  07/02/2024     BMP  12/28/2024     LIPID  12/28/2024     TSH W/FREE T4 REFLEX  12/28/2024     HEMOGLOBIN  12/28/2024     FALL RISK ASSESSMENT  01/02/2025     ADVANCE CARE PLANNING  12/29/2027     COLORECTAL CANCER  SCREENING  12/14/2028     PARATHYROID  Completed     PHOSPHORUS  Completed     HEPATITIS C SCREENING  Completed     INFLUENZA VACCINE  Completed     Pneumococcal Vaccine: 65+ Years  Completed     URINALYSIS  Completed     ALK PHOS  Completed     ZOSTER IMMUNIZATION  Completed     AORTIC ANEURYSM SCREENING (SYSTEM ASSIGNED)  Completed     IPV IMMUNIZATION  Aged Out     HPV IMMUNIZATION  Aged Out     MENINGITIS IMMUNIZATION  Aged Out     RSV MONOCLONAL ANTIBODY  Aged Out     BP Readings from Last 3 Encounters:   01/02/24 (!) 160/78   12/28/23 (!) 149/69   10/13/23 (!) 179/82    Wt Readings from Last 3 Encounters:   01/02/24 84.8 kg (187 lb)   12/28/23 82.1 kg (181 lb)   12/29/22 86.2 kg (190 lb)                  Patient Active Problem List   Diagnosis     BPH (benign prostatic hyperplasia)     Hematuria     Benign essential hypertension     Seasonal allergic rhinitis     Moderate persistent asthma without complication     Vasculogenic erectile dysfunction     Herpes zoster without complication     Recurrent major depressive disorder, in full remission (H24)     Non morbid obesity due to excess calories     Hyperlipidemia LDL goal <70     IFG (impaired fasting glucose)     Cryptogenic stroke (H)     Memory loss     Vitamin B12 deficiency (non anemic)     Borderline abnormal TFTs     Hypothyroidism, unspecified type     Stage 3b chronic kidney disease (H)     MGUS (monoclonal gammopathy of unknown significance)     Past Surgical History:   Procedure Laterality Date     BACK SURGERY      cervical fusion,neck fx repair     COLONOSCOPY       CYSTOSCOPY       CYSTOSCOPY, RETROGRADES, INSERT STENT URETER(S), COMBINED Left 12/16/2022    Procedure: CYSTOSCOPY, WITH RETROGRADE PYELOGRAM AND LEFT URETERAL STENT INSERTION;  Surgeon: Wisam Eugene MD;  Location:  OR     CYSTOSCOPY, TRANSURETHRAL RESECTION (TUR) PROSTATE, COMBINED N/A 04/03/2015    Procedure: COMBINED CYSTOSCOPY, TRANSURETHRAL RESECTION (TUR) PROSTATE;   Surgeon: Bakari Barrios MD;  Location: [facility] OR     ENT SURGERY      SINUS SURGERY X 3,SEPTOPLASTY,TONSILLECTOMY     EYE SURGERY  11/10/2017     FOOT SURGERY Right      GENITOURINARY SURGERY      TURP IN 2010     HEAD & NECK SURGERY  10/15/2017     PROSTATE SURGERY       TONSILLECTOMY         Social History     Tobacco Use     Smoking status: Never     Smokeless tobacco: Never   Substance Use Topics     Alcohol use: Yes     Alcohol/week: 3.0 - 4.0 standard drinks of alcohol     Comment: 3 drinks on weekend days     Family History   Problem Relation Age of Onset     Colon Cancer Father 70     Coronary Artery Disease No family hx of      Cerebrovascular Disease No family hx of      Diabetes No family hx of          Current Outpatient Medications   Medication Sig Dispense Refill     albuterol (PROAIR HFA/PROVENTIL HFA/VENTOLIN HFA) 108 (90 Base) MCG/ACT inhaler USE 2 INHALATIONS EVERY 6 HOURS 8.5 g 11     amoxicillin-clavulanate (AUGMENTIN) 875-125 MG tablet Take 1 tablet by mouth 2 times daily for 7 days 14 tablet 0     aspirin (ASA) 81 MG EC tablet Take 1 tablet (81 mg) by mouth daily       atorvastatin (LIPITOR) 20 MG tablet TAKE 1 TABLET DAILY (SCHEDULE APPOINTMENT TO DISCUSS FOR FURTHER REFILLS ) 90 tablet 0     buPROPion (WELLBUTRIN XL) 150 MG 24 hr tablet TAKE 1 TABLET EVERY MORNING 90 tablet 3     Coenzyme Q10 300 MG CAPS Take 300 mg by mouth       cyanocobalamin (VITAMIN B-12) 1000 MCG tablet Take 1,000 mcg by mouth       DULERA 200-5 MCG/ACT inhaler USE 2 INHALATIONS TWICE A DAY (DUE FOR A FOLLOW UP IN CLINIC, CALL [phone] TO SCHEDULE) 39 g 3     finasteride (PROSCAR) 5 MG tablet Take 5 mg by mouth daily       fluticasone (FLONASE) 50 MCG/ACT nasal spray Spray 1 spray into both nostrils daily 16 g 0     hydrochlorothiazide (HYDRODIURIL) 25 MG tablet TAKE 1 TABLET DAILY 90 tablet 3     levothyroxine (SYNTHROID/LEVOTHROID) 50 MCG tablet Take 1 tablet (50 mcg) by mouth daily 90 tablet 3      "lisinopril (ZESTRIL) 10 MG tablet Take 10 mg by mouth daily       multivitamin w/minerals (MULTI-VITAMIN) tablet Take 1 tablet by mouth daily       mupirocin (BACTROBAN) 2 % external ointment        oxybutynin ER (DITROPAN-XL) 5 MG 24 hr tablet TAKE 1 TABLET DAILY 30 tablet 0     sertraline (ZOLOFT) 100 MG tablet TAKE 1 TABLET TWICE A  tablet 3     tamsulosin (FLOMAX) 0.4 MG capsule TAKE 1 CAPSULE BY MOUTH EVERY DAY 90 capsule 1     valACYclovir (VALTREX) 1000 mg tablet Take 1 tablet (1,000 mg) by mouth 2 times daily 4 tablet 11     vitamin D3 (CHOLECALCIFEROL) 125 MCG (5000 UT) tablet Take 15,000 Units by mouth       Vitamin E (VITAMIN E/D-ALPHA NATURAL) 268 MG (400 UNIT) CAPS Take 180 mg by mouth daily               Review of Systems   Constitutional:  Negative for chills and fever.   HENT:  Positive for ear pain and hearing loss. Negative for congestion and sore throat.    Eyes:  Negative for pain and visual disturbance.   Respiratory:  Negative for cough and shortness of breath.    Cardiovascular:  Negative for chest pain, palpitations and peripheral edema.   Gastrointestinal:  Negative for abdominal pain, constipation, diarrhea, heartburn, hematochezia and nausea.   Genitourinary:  Positive for frequency, impotence and urgency. Negative for dysuria, genital sores, hematuria and penile discharge.   Musculoskeletal:  Negative for arthralgias, joint swelling and myalgias.   Skin:  Negative for rash.   Neurological:  Negative for dizziness, weakness, headaches and paresthesias.   Psychiatric/Behavioral:  Negative for mood changes. The patient is not nervous/anxious.          OBJECTIVE:   BP (!) 160/78 (BP Location: Left arm, Patient Position: Sitting, Cuff Size: Adult Large)   Pulse 82   Temp 98.1  F (36.7  C) (Oral)   Resp 16   Ht 1.753 m (5' 9\")   Wt 84.8 kg (187 lb)   SpO2 97%   BMI 27.62 kg/m   Estimated body mass index is 27.62 kg/m  as calculated from the following:    Height as of this " "encounter: 1.753 m (5' 9\").    Weight as of this encounter: 84.8 kg (187 lb).  Physical Exam  GENERAL: healthy, alert and no distress  EYES: Eyes grossly normal to inspection, PERRL and conjunctivae and sclerae normal  HENT: ear canals and TM's normal, nose and mouth without ulcers or lesions  NECK: no adenopathy, no asymmetry, masses, or scars and thyroid normal to palpation  RESP: lungs clear to auscultation - no rales, rhonchi or wheezes  CV: regular rate and rhythm, normal S1 S2, no S3 or S4, no murmur, click or rub, no peripheral edema and peripheral pulses strong  ABDOMEN: soft, nontender, no hepatosplenomegaly, no masses and bowel sounds normal  RECTAL: normal sphincter tone, no rectal masses, prostate normal size, smooth, nontender without nodules or masses  MS: no gross musculoskeletal defects noted, no edema  SKIN: no suspicious lesions or rashes  NEURO: Normal strength and tone, mentation intact and speech normal  PSYCH: mentation appears normal, affect normal/bright        ASSESSMENT / PLAN:       ICD-10-CM    1. Encounter for Medicare annual wellness exam  Z00.00       2. Preop general physical exam  Z01.818 EKG 12-lead complete w/read - Clinics      3. Primary osteoarthritis of left hip  M16.12       4. Stage 3b chronic kidney disease (H)  N18.32       5. Benign essential hypertension  I10 amLODIPine (NORVASC) 5 MG tablet      6. Moderate persistent asthma without complication  J45.40       7. Benign prostatic hyperplasia with lower urinary tract symptoms, symptom details unspecified  N40.1       8. Recurrent major depressive disorder, in full remission (H24)  F33.42       9. Hyperlipidemia LDL goal <70  E78.5       10. Elevated prostate specific antigen (PSA)  R97.20 PSA tumor marker      11. Hypothyroidism, unspecified type  E03.9       12. Need for Tdap vaccination  Z23         -OK to proceed with surgery, if EKG stable  -renal fxn stable, but not significantly improved after urological procedure in " [place], he thinks he is seeing a nephrologist there; he should follow up with nephrology there or here to see if further evaluation for renal function is needed after hydronephrosis has been addressed  -blood pressure is NOT well controlled; he is not on amlodipine, he may have run out, I sent new prescription and told him to restart  -asthma is stable   -LUTS are stable, but PSA is up; need to recheck when he returns to [place] in Spring; discussed  -TFTs are OK        COUNSELING:  Reviewed preventive health counseling, as reflected in patient instructions  Special attention given to:       Consider AAA screening for ages 65-75 and smoking history; CT 2022       Regular exercise       Healthy diet/nutrition       Immunizations  He declined my recommendation for COVID shot; Tdap today            Consider lung cancer screening for ages 55-80 years (77 for Medicare) and 20 pack-year smoking history ; never smoker        Colon cancer screening; repeat 12/2028       Prostate cancer screening; recommend repeating PSA in spring when he returns due to increase in PSA since last check         He reports that he has never smoked. He has never used smokeless tobacco.      Appropriate preventive services were discussed with this patient, including applicable screening as appropriate for fall prevention, nutrition, physical activity, Tobacco-use cessation, weight loss and cognition.  Checklist reviewing preventive services available has been given to the patient.    Reviewed patients plan of care and provided an AVS. The Basic Care Plan (routine screening as documented in Health Maintenance) for Dionte meets the Care Plan requirement. This Care Plan has been established and reviewed with the Patient.        Romeo Fonseca MD  [facility]    Identified Health Risks:    Answers submitted by the patient for this visit:  Patient Health Questionnaire (Submitted on 1/2/2024)  If you checked off any problems, how  difficult have these problems made it for you to do your work, take care of things at home, or get along with other people?: Not difficult at all  PHQ9 TOTAL SCORE: 0      [place] [address] [facility], [address]  [facility] [address]  Phone: [phone]  Primary Provider: Jay Sesay  Pre-op Performing Provider: JAY SESAY      PREOPERATIVE EVALUATION:  Today's date: 1/2/2024    Andres is a 73 year old, presenting for the following:  Physical        Surgical Information:  Surgery/Procedure: left hip replacement   Surgery Location: [place]  Surgeon: Niraj Ashton  Surgery Date: 1/26/24  Time of Surgery:   Where patient plans to recover: At home with family  Fax number for surgical facility: [phone]    Assessment & Plan     The proposed surgical procedure is considered INTERMEDIATE risk.          Possible Sleep Apnea:    Suspected SAMIR, diagnosis pending, anesthesia team be aware        Risks and Recommendations:  The patient has the following additional risks and recommendations for perioperative complications:   - Consult Hospitalist / IM to assist with post-op medical management   - History of delirium or dementia  Obstructive Sleep Apnea:   See above   Anemia/Bleeding/Clotting:   Mild anemia, likely from underlying chronic renal disease; ok for proceeding with surgery     Antiplatelet or Anticoagulation Medication Instructions:   - aspirin: Discontinue aspirin 7-10 days prior to procedure to reduce bleeding risk. It should be resumed postoperatively.     Additional Medication Instructions:  Patient is to take all scheduled medications on the day of surgery EXCEPT for modifications listed below:   - ACE/ARB: HOLD on day of surgery (minimum 11 hours for general anesthesia).   - Calcium Channel Blockers: May be continued on the day of surgery.   - Diuretics: HOLD on the day of surgery.   - Statins: Continue taking on the day of surgery.    - SSRIs, SNRIs, TCAs, Antipsychotics: Continue  without modification.    - LABA, inhaled corticosteroid, long-acting anticholinergics: Continue without modification.   - rescue Inhaler: Continue PRN. Bring to hospital on the day of surgery.   - anticholinergics: HOLD anticholingeric medication in older patient at risk of delirium.     RECOMMENDATION:  APPROVAL GIVEN to proceed with proposed procedure pending review of diagnostic evaluation.      No LOS data to display   Time spent by me doing chart review, history and exam, documentation and further activities per the note      Subjective       HPI related to upcoming procedure: Left hip pain.  This patient reports being able to perform 4 METS of physical activity without chest pain or dyspnea.           11/21/2022     2:36 PM   Preop Questions   1. Have you ever had a heart attack or stroke? No   2. Have you ever had surgery on your heart or blood vessels, such as a stent placement, a coronary artery bypass, or surgery on an artery in your head, neck, heart, or legs? No   3. Do you have chest pain with activity? No   4. Do you have a history of  heart failure? No   5. Do you currently have a cold, bronchitis or symptoms of other infection? No   6. Do you have a cough, shortness of breath, or wheezing? No    7. Do you or anyone in your family have previous history of blood clots? No   8. Do you or does anyone in your family have a serious bleeding problem such as prolonged bleeding following surgeries or cuts? No   9. Have you ever had problems with anemia or been told to take iron pills? No   10. Have you had any abnormal blood loss such as black, tarry or bloody stools? No   11. Have you ever had a blood transfusion? No   12. Are you willing to have a blood transfusion if it is medically needed before, during, or after your surgery? Yes   13. Have you or any of your relatives ever had problems with anesthesia? YES - sister    14. Do you have sleep apnea, excessive snoring or daytime drowsiness? No   15. Do you  have any artifical heart valves or other implanted medical devices like a pacemaker, defibrillator, or continuous glucose monitor? No   16. Do you have artificial joints? No   17. Are you allergic to latex? No         [unfilled]  Constitutional, neuro, ENT, endocrine, pulmonary, cardiac, gastrointestinal, genitourinary, musculoskeletal, integument and psychiatric systems are negative, except as otherwise noted.    Patient Active Problem List    Diagnosis Date Noted     Hypothyroidism, unspecified type 12/29/2022     Priority: Medium     Stage 3b chronic kidney disease (H) 12/29/2022     Priority: Medium     MGUS (monoclonal gammopathy of unknown significance) 12/29/2022     Priority: Medium     Vitamin B12 deficiency (non anemic) 11/21/2022     Priority: Medium     Borderline abnormal TFTs 11/21/2022     Priority: Medium     IFG (impaired fasting glucose) 08/16/2022     Priority: Medium     Cryptogenic stroke (H) 08/16/2022     Priority: Medium     Memory loss 08/16/2022     Priority: Medium     Hyperlipidemia LDL goal <70 10/27/2017     Priority: Medium     Recurrent major depressive disorder, in full remission (H24) 11/09/2016     Priority: Medium     Non morbid obesity due to excess calories 11/09/2016     Priority: Medium     Benign essential hypertension 10/18/2016     Priority: Medium     Seasonal allergic rhinitis 10/18/2016     Priority: Medium     Moderate persistent asthma without complication 10/18/2016     Priority: Medium     Vasculogenic erectile dysfunction 10/18/2016     Priority: Medium     Herpes zoster without complication 10/18/2016     Priority: Medium     BPH (benign prostatic hyperplasia) 04/03/2015     Priority: Medium     Hematuria 04/03/2015     Priority: Medium      Past Medical History:   Diagnosis Date     Arthritis      BPH (benign prostatic hyperplasia)      Cryptogenic stroke (H) 08/16/2022     Depression      Depressive disorder 2005     History of spinal cord injury       Hypertension      Mumps      Non morbid obesity due to excess calories 11/09/2016     Recurrent major depressive disorder, in full remission (H24) 11/09/2016     Thyroid disease      Uncomplicated asthma      Past Surgical History:   Procedure Laterality Date     BACK SURGERY      cervical fusion,neck fx repair     COLONOSCOPY       CYSTOSCOPY       CYSTOSCOPY, RETROGRADES, INSERT STENT URETER(S), COMBINED Left 12/16/2022    Procedure: CYSTOSCOPY, WITH RETROGRADE PYELOGRAM AND LEFT URETERAL STENT INSERTION;  Surgeon: Wisam Eugene MD;  Location:  OR     CYSTOSCOPY, TRANSURETHRAL RESECTION (TUR) PROSTATE, COMBINED N/A 04/03/2015    Procedure: COMBINED CYSTOSCOPY, TRANSURETHRAL RESECTION (TUR) PROSTATE;  Surgeon: Bakari Barrios MD;  Location:  OR     ENT SURGERY      SINUS SURGERY X 3,SEPTOPLASTY,TONSILLECTOMY     EYE SURGERY  11/10/2017     FOOT SURGERY Right      GENITOURINARY SURGERY      TURP IN 2010     HEAD & NECK SURGERY  10/15/2017     PROSTATE SURGERY       TONSILLECTOMY       Current Outpatient Medications   Medication Sig Dispense Refill     albuterol (PROAIR HFA/PROVENTIL HFA/VENTOLIN HFA) 108 (90 Base) MCG/ACT inhaler USE 2 INHALATIONS EVERY 6 HOURS 8.5 g 11     amLODIPine (NORVASC) 5 MG tablet Take 1 tablet (5 mg) by mouth daily 90 tablet 3     amoxicillin-clavulanate (AUGMENTIN) 875-125 MG tablet Take 1 tablet by mouth 2 times daily for 7 days 14 tablet 0     aspirin (ASA) 81 MG EC tablet Take 1 tablet (81 mg) by mouth daily       atorvastatin (LIPITOR) 20 MG tablet TAKE 1 TABLET DAILY (SCHEDULE APPOINTMENT TO DISCUSS FOR FURTHER REFILLS ) 90 tablet 0     buPROPion (WELLBUTRIN XL) 150 MG 24 hr tablet TAKE 1 TABLET EVERY MORNING 90 tablet 3     Coenzyme Q10 300 MG CAPS Take 300 mg by mouth       cyanocobalamin (VITAMIN B-12) 1000 MCG tablet Take 1,000 mcg by mouth       DULERA 200-5 MCG/ACT inhaler USE 2 INHALATIONS TWICE A DAY (DUE FOR A FOLLOW UP IN CLINIC, CALL [phone] TO  "SCHEDULE) 39 g 3     finasteride (PROSCAR) 5 MG tablet Take 5 mg by mouth daily       fluticasone (FLONASE) 50 MCG/ACT nasal spray Spray 1 spray into both nostrils daily 16 g 0     hydrochlorothiazide (HYDRODIURIL) 25 MG tablet TAKE 1 TABLET DAILY 90 tablet 3     levothyroxine (SYNTHROID/LEVOTHROID) 50 MCG tablet Take 1 tablet (50 mcg) by mouth daily 90 tablet 3     lisinopril (ZESTRIL) 10 MG tablet Take 10 mg by mouth daily       multivitamin w/minerals (MULTI-VITAMIN) tablet Take 1 tablet by mouth daily       mupirocin (BACTROBAN) 2 % external ointment        oxybutynin ER (DITROPAN-XL) 5 MG 24 hr tablet TAKE 1 TABLET DAILY 30 tablet 0     sertraline (ZOLOFT) 100 MG tablet TAKE 1 TABLET TWICE A  tablet 3     tamsulosin (FLOMAX) 0.4 MG capsule TAKE 1 CAPSULE BY MOUTH EVERY DAY 90 capsule 1     valACYclovir (VALTREX) 1000 mg tablet Take 1 tablet (1,000 mg) by mouth 2 times daily 4 tablet 11     vitamin D3 (CHOLECALCIFEROL) 125 MCG (5000 UT) tablet Take 15,000 Units by mouth       Vitamin E (VITAMIN E/D-ALPHA NATURAL) 268 MG (400 UNIT) CAPS Take 180 mg by mouth daily         Allergies   Allergen Reactions     Ciprofloxacin Shortness Of Breath, Itching and Difficulty breathing     Sulfamethizole Anaphylaxis     Moxifloxacin Hives        Social History     Tobacco Use     Smoking status: Never     Smokeless tobacco: Never   Substance Use Topics     Alcohol use: Yes     Alcohol/week: 3.0 - 4.0 standard drinks of alcohol     Comment: 3 drinks on weekend days     Family History   Problem Relation Age of Onset     Colon Cancer Father 70     Coronary Artery Disease No family hx of      Cerebrovascular Disease No family hx of      Diabetes No family hx of      History   Drug Use No         Objective     BP (!) 160/78 (BP Location: Left arm, Patient Position: Sitting, Cuff Size: Adult Large)   Pulse 82   Temp 98.1  F (36.7  C) (Oral)   Resp 16   Ht 1.753 m (5' 9\")   Wt 84.8 kg (187 lb)   SpO2 97%   BMI 27.62 " gomez/m      [unfilled]  See above    Recent Labs   Lab Test 12/28/23  0739 12/28/23  0723 12/26/22  0738 12/16/22  1046 12/12/22  0858 11/29/22  1349 11/21/22  1555 08/16/22  1050 07/27/22  1330   HGB  --  13.1*  --   --   --   --  13.5  --   --    PLT  --  317  --   --   --   --   --   --   --    INR 1.04  --   --   --   --   --   --   --   --    NA  --  136  --   --  135*   < > 138  --   --    POTASSIUM  --  4.5  --  4.5 4.8  --  4.7  --    < >   CR  --  1.59* 1.66*  --  1.65*   < > 1.61*  --   --    A1C  --   --   --   --   --   --  5.8* 5.9*  --     < > = values in this interval not displayed.        Diagnostics:  No results found for this or any previous visit (from the past 24 hour(s)).   EKG pending     Revised Cardiac Risk Index (RCRI):  The patient has the following serious cardiovascular risks for perioperative complications:   - No serious cardiac risks = 0 points     RCRI Interpretation: 0 points: Class I (very low risk - 0.4% complication rate)         Signed Electronically by: Romeo Fonseca MD  Copy of this evaluation report is provided to requesting physician.

## 2024-01-02 NOTE — NURSING NOTE
Prior to immunization administration, verified patients identity using patient s name and date of birth. Please see Immunization Activity for additional information.     Screening Questionnaire for Adult Immunization    Are you sick today?   No   Do you have allergies to medications, food, a vaccine component or latex?   No   Have you ever had a serious reaction after receiving a vaccination?   No   Do you have a long-term health problem with heart, lung, kidney, or metabolic disease (e.g., diabetes), asthma, a blood disorder, no spleen, complement component deficiency, a cochlear implant, or a spinal fluid leak?  Are you on long-term aspirin therapy?   Yes   Do you have cancer, leukemia, HIV/AIDS, or any other immune system problem?   No   Do you have a parent, brother, or sister with an immune system problem?   No   In the past 3 months, have you taken medications that affect  your immune system, such as prednisone, other steroids, or anticancer drugs; drugs for the treatment of rheumatoid arthritis, Crohn s disease, or psoriasis; or have you had radiation treatments?   No   Have you had a seizure, or a brain or other nervous system problem?   No   During the past year, have you received a transfusion of blood or blood    products, or been given immune (gamma) globulin or antiviral drug?   No   For women: Are you pregnant or is there a chance you could become       pregnant during the next month?   No   Have you received any vaccinations in the past 4 weeks?   No     Immunization questionnaire was positive for at least one answer.  Notified Dr. Fonseca.  Reviewed possible charges for Tdap if given in the clinic. Patient requested Tdap today at visit.  Deborah Rivera MA      Patient instructed to remain in clinic for 15 minutes afterwards, and to report any adverse reactions.     Screening performed by Deborah Rivera MA on 1/2/2024 at 12:15 PM.

## 2024-01-02 NOTE — PATIENT INSTRUCTIONS
-On AM of surgery hold lisinopril, hydrochlorothiazide and oxybutynin   -Hold aspirin 7 days prior to surgery  -See a NEPHROLOGIST (kidney specialist, not just a urologist) in [place] or Dr. Bhatti here at the [facility] to follow up on your kidney function        Patient Education   Personalized Prevention Plan  You are due for the preventive services outlined below.  Your care team is available to assist you in scheduling these services.  If you have already completed any of these items, please share that information with your care team to update in your medical record.  Health Maintenance Due   Topic Date Due    Depression Action Plan  Never done    RSV VACCINE (Pregnancy & 60+) (1 - 1-dose 60+ series) Never done    Asthma Action Plan - yearly  09/13/2018    ANNUAL REVIEW OF HM ORDERS  07/25/2023    Diptheria Tetanus Pertussis (DTAP/TDAP/TD) Vaccine (3 - Td or Tdap) 08/18/2023    COVID-19 Vaccine (6 - 2023-24 season) 09/01/2023    Annual Wellness Visit  12/29/2023    Kidney Microalbumin Urine Test  01/10/2024     Hearing Loss: Care Instructions  Overview     Hearing loss is a sudden or slow decrease in how well you hear. It can range from slight to profound. Permanent hearing loss can occur with aging. It also can happen when you are exposed long-term to loud noise. Examples include listening to loud music, riding motorcycles, or being around other loud machines.  Hearing loss can affect your work and home life. It can make you feel lonely or depressed. You may feel that you have lost your independence. But hearing aids and other devices can help you hear better and feel connected to others.  Follow-up care is a key part of your treatment and safety. Be sure to make and go to all appointments, and call your doctor if you are having problems. It's also a good idea to know your test results and keep a list of the medicines you take.  How can you care for yourself at home?  Avoid loud noises whenever possible. This  helps keep your hearing from getting worse.  Always wear hearing protection around loud noises.  Wear a hearing aid as directed.  A professional can help you pick a hearing aid that will work best for you.  You can also get hearing aids over the counter for mild to moderate hearing loss.  Have hearing tests as your doctor suggests. They can show whether your hearing has changed. Your hearing aid may need to be adjusted.  Use other devices as needed. These may include:  Telephone amplifiers and hearing aids that can connect to a television, stereo, radio, or microphone.  Devices that use lights or vibrations. These alert you to the doorbell, a ringing telephone, or a baby monitor.  Television closed-captioning. This shows the words at the bottom of the screen. Most new TVs can do this.  TTY (text telephone). This lets you type messages back and forth on the telephone instead of talking or listening. These devices are also called TDD. When messages are typed on the keyboard, they are sent over the phone line to a receiving TTY. The message is shown on a monitor.  Use text messaging, social media, and email if it is hard for you to communicate by telephone.  Try to learn a listening technique called speechreading. It is not lipreading. You pay attention to people's gestures, expressions, posture, and tone of voice. These clues can help you understand what a person is saying. Face the person you are talking to, and have them face you. Make sure the lighting is good. You need to see the other person's face clearly.  Think about counseling if you need help to adjust to your hearing loss.  When should you call for help?  Watch closely for changes in your health, and be sure to contact your doctor if:    You think your hearing is getting worse.     You have new symptoms, such as dizziness or nausea.   Where can you learn more?  Go to https://www.healthwise.net/patiented  Enter R798 in the search box to learn more about  "\"Hearing Loss: Care Instructions.\"  Current as of: February 28, 2023               Content Version: 13.8    3359-4622 [employer].   Care instructions adapted under license by your healthcare professional. If you have questions about a medical condition or this instruction, always ask your healthcare professional. [employer] disclaims any warranty or liability for your use of this information.      Bladder Training: Care Instructions  Your Care Instructions     Bladder training is used to treat urge incontinence and stress incontinence. Urge incontinence means that the need to urinate comes on so fast that you can't get to a toilet in time. Stress incontinence means that you leak urine because of pressure on your bladder. For example, it may happen when you laugh, cough, or lift something heavy.  Bladder training can increase how long you can wait before you have to urinate. It can also help your bladder hold more urine. And it can give you better control over the urge to urinate.  It is important to remember that bladder training takes a few weeks to a few months to make a difference. You may not see results right away, but don't give up.  Follow-up care is a key part of your treatment and safety. Be sure to make and go to all appointments, and call your doctor if you are having problems. It's also a good idea to know your test results and keep a list of the medicines you take.  How can you care for yourself at home?  Work with your doctor to come up with a bladder training program that is right for you. You may use one or more of the following methods.  Delayed urination  In the beginning, try to keep from urinating for 5 minutes after you first feel the need to go.  While you wait, take deep, slow breaths to relax. Kegel exercises can also help you delay the need to go to the bathroom.  After some practice, when you can easily wait 5 minutes to urinate, try to wait 10 minutes before you " "urinate.  Slowly increase the waiting period until you are able to control when you have to urinate.  Scheduled urination  Empty your bladder when you first wake up in the morning.  Schedule times throughout the day when you will urinate.  Start by going to the bathroom every hour, even if you don't need to go.  Slowly increase the time between trips to the bathroom.  When you have found a schedule that works well for you, keep doing it.  If you wake up during the night and have to urinate, do it. Apply your schedule to waking hours only.  Kegel exercises  These tighten and strengthen pelvic muscles, which can help you control the flow of urine. (If doing these exercises causes pain, stop doing them and talk with your doctor.) To do Kegel exercises:  Squeeze your muscles as if you were trying not to pass gas. Or squeeze your muscles as if you were stopping the flow of urine. Your belly, legs, and buttocks shouldn't move.  Hold the squeeze for 3 seconds, then relax for 5 to 10 seconds.  Start with 3 seconds, then add 1 second each week until you are able to squeeze for 10 seconds.  Repeat the exercise 10 times a session. Do 3 to 8 sessions a day.  When should you call for help?  Watch closely for changes in your health, and be sure to contact your doctor if:    Your incontinence is getting worse.     You do not get better as expected.   Where can you learn more?  Go to https://www.[employer].net/patiented  Enter V684 in the search box to learn more about \"Bladder Training: Care Instructions.\"  Current as of: February 28, 2023               Content Version: 13.8    1392-0204 [email or web address].   Care instructions adapted under license by your healthcare professional. If you have questions about a medical condition or this instruction, always ask your healthcare professional. [email or web address] disclaims any warranty or liability for your use of this information.         "

## 2024-01-17 ENCOUNTER — TRANSFERRED RECORDS (OUTPATIENT)
Dept: HEALTH INFORMATION MANAGEMENT | Facility: CLINIC | Age: 74
End: 2024-01-17
Payer: COMMERCIAL

## 2024-01-22 ENCOUNTER — TRANSFERRED RECORDS (OUTPATIENT)
Dept: FAMILY MEDICINE | Facility: CLINIC | Age: 74
End: 2024-01-22
Payer: COMMERCIAL

## 2024-05-31 ENCOUNTER — APPOINTMENT (OUTPATIENT)
Dept: URBAN - METROPOLITAN AREA CLINIC 259 | Age: 74
Setting detail: DERMATOLOGY
End: 2024-06-01

## 2024-05-31 DIAGNOSIS — L57.0 ACTINIC KERATOSIS: ICD-10-CM

## 2024-05-31 DIAGNOSIS — D22 MELANOCYTIC NEVI: ICD-10-CM

## 2024-05-31 DIAGNOSIS — D18.0 HEMANGIOMA: ICD-10-CM

## 2024-05-31 DIAGNOSIS — L21.8 OTHER SEBORRHEIC DERMATITIS: ICD-10-CM

## 2024-05-31 DIAGNOSIS — L81.4 OTHER MELANIN HYPERPIGMENTATION: ICD-10-CM

## 2024-05-31 DIAGNOSIS — Z71.89 OTHER SPECIFIED COUNSELING: ICD-10-CM

## 2024-05-31 DIAGNOSIS — L82.1 OTHER SEBORRHEIC KERATOSIS: ICD-10-CM

## 2024-05-31 DIAGNOSIS — Z85.828 PERSONAL HISTORY OF OTHER MALIGNANT NEOPLASM OF SKIN: ICD-10-CM

## 2024-05-31 DIAGNOSIS — L57.8 OTHER SKIN CHANGES DUE TO CHRONIC EXPOSURE TO NONIONIZING RADIATION: ICD-10-CM

## 2024-05-31 PROBLEM — D22.5 MELANOCYTIC NEVI OF TRUNK: Status: ACTIVE | Noted: 2024-05-31

## 2024-05-31 PROBLEM — D18.01 HEMANGIOMA OF SKIN AND SUBCUTANEOUS TISSUE: Status: ACTIVE | Noted: 2024-05-31

## 2024-05-31 PROCEDURE — 99203 OFFICE O/P NEW LOW 30 MIN: CPT | Mod: 25

## 2024-05-31 PROCEDURE — OTHER MIPS QUALITY: OTHER

## 2024-05-31 PROCEDURE — OTHER PRESCRIPTION: OTHER

## 2024-05-31 PROCEDURE — OTHER LIQUID NITROGEN: OTHER

## 2024-05-31 PROCEDURE — 17004 DESTROY PREMAL LESIONS 15/>: CPT

## 2024-05-31 PROCEDURE — OTHER PRESCRIPTION MEDICATION MANAGEMENT: OTHER

## 2024-05-31 PROCEDURE — OTHER COUNSELING: OTHER

## 2024-05-31 RX ORDER — KETOCONAZOLE 20 MG/ML
SHAMPOO, SUSPENSION TOPICAL
Qty: 120 | Refills: 3 | Status: ERX | COMMUNITY
Start: 2024-05-31

## 2024-05-31 ASSESSMENT — LOCATION DETAILED DESCRIPTION DERM
LOCATION DETAILED: LEFT SUPERIOR LATERAL NECK
LOCATION DETAILED: LEFT SUPERIOR MEDIAL FOREHEAD
LOCATION DETAILED: LEFT SUPERIOR FOREHEAD
LOCATION DETAILED: RIGHT SUPERIOR LATERAL NECK
LOCATION DETAILED: POSTERIOR MID-PARIETAL SCALP
LOCATION DETAILED: LEFT LATERAL UPPER BACK
LOCATION DETAILED: LEFT SUPERIOR LATERAL FOREHEAD
LOCATION DETAILED: LEFT INFERIOR HELIX
LOCATION DETAILED: INFERIOR THORACIC SPINE
LOCATION DETAILED: LEFT DISTAL CALF
LOCATION DETAILED: LEFT MID-UPPER BACK
LOCATION DETAILED: LEFT INFERIOR LATERAL FOREHEAD
LOCATION DETAILED: LEFT SUPERIOR HELIX
LOCATION DETAILED: LEFT DISTAL DORSAL FOREARM
LOCATION DETAILED: LEFT SUPERIOR LATERAL MALAR CHEEK
LOCATION DETAILED: LEFT MEDIAL FRONTAL SCALP
LOCATION DETAILED: LEFT SUPERIOR UPPER BACK
LOCATION DETAILED: LEFT INFERIOR LATERAL MALAR CHEEK
LOCATION DETAILED: RIGHT DISTAL CALF
LOCATION DETAILED: RIGHT MEDIAL SUPERIOR CHEST
LOCATION DETAILED: NASAL DORSUM
LOCATION DETAILED: RIGHT SUPERIOR MEDIAL UPPER BACK
LOCATION DETAILED: LEFT DORSAL WRIST
LOCATION DETAILED: LEFT FOREHEAD
LOCATION DETAILED: LEFT CENTRAL FRONTAL SCALP
LOCATION DETAILED: LEFT PROXIMAL DORSAL FOREARM
LOCATION DETAILED: RIGHT SUPERIOR UPPER BACK

## 2024-05-31 ASSESSMENT — LOCATION SIMPLE DESCRIPTION DERM
LOCATION SIMPLE: RIGHT CALF
LOCATION SIMPLE: RIGHT UPPER BACK
LOCATION SIMPLE: POSTERIOR SCALP
LOCATION SIMPLE: NECK
LOCATION SIMPLE: LEFT EAR
LOCATION SIMPLE: CHEST
LOCATION SIMPLE: LEFT FOREHEAD
LOCATION SIMPLE: UPPER BACK
LOCATION SIMPLE: LEFT WRIST
LOCATION SIMPLE: LEFT CALF
LOCATION SIMPLE: LEFT SCALP
LOCATION SIMPLE: LEFT CHEEK
LOCATION SIMPLE: LEFT UPPER BACK
LOCATION SIMPLE: NOSE
LOCATION SIMPLE: LEFT FOREARM

## 2024-05-31 ASSESSMENT — LOCATION ZONE DERM
LOCATION ZONE: NOSE
LOCATION ZONE: EAR
LOCATION ZONE: FACE
LOCATION ZONE: ARM
LOCATION ZONE: NECK
LOCATION ZONE: TRUNK
LOCATION ZONE: LEG
LOCATION ZONE: SCALP

## 2024-05-31 NOTE — HPI: FULL BODY SKIN EXAMINATION
How Severe Are Your Spot(S)?: mild
What Type Of Note Output Would You Prefer (Optional)?: Standard Output
What Is The Reason For Today's Visit?: Full Body Skin Examination
What Is The Reason For Today's Visit? (Being Monitored For X): concerning skin lesions on an annual basis
Additional History: Patient reports previous history of AKs treated with LN2. He states having numerous lesions throughout he is seeking further treatment of LN2. [place] and lives part time in [place], so he has experienced lots of sun exposure throughout his life. Additionally, he has history of itchy scalp which he uses ketoconazole shampoo to manage. He is seeking refills of the shampoo.

## 2024-05-31 NOTE — PROCEDURE: LIQUID NITROGEN
Show Aperture Variable?: Yes
Detail Level: Zone
Number Of Freeze-Thaw Cycles: 3 freeze-thaw cycles
Render Post-Care Instructions In Note?: no
Duration Of Freeze Thaw-Cycle (Seconds): 3
Post-Care Instructions: I reviewed with the patient in detail post-care instructions. Patient is to wear sunprotection, and avoid picking at any of the treated lesions. Pt may apply Vaseline to crusted or scabbing areas.
Consent: The patient's consent was obtained including but not limited to risks of crusting, scabbing, blistering, scarring, darker or lighter pigmentary change, recurrence, incomplete removal and infection.

## 2024-07-10 ENCOUNTER — TRANSFERRED RECORDS (OUTPATIENT)
Dept: HEALTH INFORMATION MANAGEMENT | Facility: CLINIC | Age: 74
End: 2024-07-10
Payer: COMMERCIAL

## 2024-07-31 ENCOUNTER — TRANSFERRED RECORDS (OUTPATIENT)
Dept: HEALTH INFORMATION MANAGEMENT | Facility: CLINIC | Age: 74
End: 2024-07-31
Payer: COMMERCIAL

## 2024-08-01 ENCOUNTER — TRANSFERRED RECORDS (OUTPATIENT)
Dept: HEALTH INFORMATION MANAGEMENT | Facility: CLINIC | Age: 74
End: 2024-08-01
Payer: COMMERCIAL

## 2024-09-09 ASSESSMENT — ASTHMA QUESTIONNAIRES
ACT_TOTALSCORE: 25
ACT_TOTALSCORE: 25
QUESTION_3 LAST FOUR WEEKS HOW OFTEN DID YOUR ASTHMA SYMPTOMS (WHEEZING, COUGHING, SHORTNESS OF BREATH, CHEST TIGHTNESS OR PAIN) WAKE YOU UP AT NIGHT OR EARLIER THAN USUAL IN THE MORNING: NOT AT ALL
QUESTION_5 LAST FOUR WEEKS HOW WOULD YOU RATE YOUR ASTHMA CONTROL: COMPLETELY CONTROLLED
QUESTION_4 LAST FOUR WEEKS HOW OFTEN HAVE YOU USED YOUR RESCUE INHALER OR NEBULIZER MEDICATION (SUCH AS ALBUTEROL): NOT AT ALL
QUESTION_1 LAST FOUR WEEKS HOW MUCH OF THE TIME DID YOUR ASTHMA KEEP YOU FROM GETTING AS MUCH DONE AT WORK, SCHOOL OR AT HOME: NONE OF THE TIME
QUESTION_2 LAST FOUR WEEKS HOW OFTEN HAVE YOU HAD SHORTNESS OF BREATH: NOT AT ALL

## 2024-09-09 ASSESSMENT — PATIENT HEALTH QUESTIONNAIRE - PHQ9
10. IF YOU CHECKED OFF ANY PROBLEMS, HOW DIFFICULT HAVE THESE PROBLEMS MADE IT FOR YOU TO DO YOUR WORK, TAKE CARE OF THINGS AT HOME, OR GET ALONG WITH OTHER PEOPLE: NOT DIFFICULT AT ALL
SUM OF ALL RESPONSES TO PHQ QUESTIONS 1-9: 0
SUM OF ALL RESPONSES TO PHQ QUESTIONS 1-9: 0

## 2024-09-11 ENCOUNTER — VIRTUAL VISIT (OUTPATIENT)
Dept: FAMILY MEDICINE | Facility: CLINIC | Age: 74
End: 2024-09-11
Payer: COMMERCIAL

## 2024-09-11 DIAGNOSIS — E03.8 SUBCLINICAL HYPOTHYROIDISM: ICD-10-CM

## 2024-09-11 DIAGNOSIS — I10 BENIGN ESSENTIAL HYPERTENSION: ICD-10-CM

## 2024-09-11 DIAGNOSIS — N40.1 BENIGN PROSTATIC HYPERPLASIA WITH LOWER URINARY TRACT SYMPTOMS, SYMPTOM DETAILS UNSPECIFIED: ICD-10-CM

## 2024-09-11 DIAGNOSIS — J45.40 MODERATE PERSISTENT ASTHMA WITHOUT COMPLICATION: ICD-10-CM

## 2024-09-11 DIAGNOSIS — E78.5 HYPERLIPIDEMIA LDL GOAL <130: ICD-10-CM

## 2024-09-11 DIAGNOSIS — F33.42 RECURRENT MAJOR DEPRESSIVE DISORDER, IN FULL REMISSION (H): ICD-10-CM

## 2024-09-11 PROCEDURE — 99442 PR PHYSICIAN TELEPHONE EVALUATION 11-20 MIN: CPT | Mod: 93 | Performed by: INTERNAL MEDICINE

## 2024-09-11 RX ORDER — ATORVASTATIN CALCIUM 20 MG/1
20 TABLET, FILM COATED ORAL DAILY
Qty: 90 TABLET | Refills: 3 | Status: SHIPPED | OUTPATIENT
Start: 2024-09-11

## 2024-09-11 RX ORDER — ALBUTEROL SULFATE 90 UG/1
AEROSOL, METERED RESPIRATORY (INHALATION)
Qty: 8.5 G | Refills: 11 | Status: SHIPPED | OUTPATIENT
Start: 2024-09-11

## 2024-09-11 RX ORDER — MOMETASONE FUROATE AND FORMOTEROL FUMARATE DIHYDRATE 200; 5 UG/1; UG/1
AEROSOL RESPIRATORY (INHALATION)
Qty: 39 G | Refills: 3 | Status: SHIPPED | OUTPATIENT
Start: 2024-09-11

## 2024-09-11 RX ORDER — BUPROPION HYDROCHLORIDE 150 MG/1
150 TABLET ORAL EVERY MORNING
Qty: 90 TABLET | Refills: 3 | Status: SHIPPED | OUTPATIENT
Start: 2024-09-11

## 2024-09-11 RX ORDER — TAMSULOSIN HYDROCHLORIDE 0.4 MG/1
0.4 CAPSULE ORAL DAILY
Qty: 90 CAPSULE | Refills: 3 | Status: SHIPPED | OUTPATIENT
Start: 2024-09-11

## 2024-09-11 RX ORDER — AMLODIPINE BESYLATE 5 MG/1
5 TABLET ORAL DAILY
Qty: 90 TABLET | Refills: 3 | Status: SHIPPED | OUTPATIENT
Start: 2024-09-11

## 2024-09-11 RX ORDER — LEVOTHYROXINE SODIUM 50 UG/1
50 TABLET ORAL DAILY
Qty: 90 TABLET | Refills: 3 | Status: SHIPPED | OUTPATIENT
Start: 2024-09-11

## 2024-09-11 RX ORDER — LISINOPRIL 10 MG/1
10 TABLET ORAL DAILY
Qty: 90 TABLET | Refills: 3 | Status: SHIPPED | OUTPATIENT
Start: 2024-09-11

## 2024-09-11 RX ORDER — SERTRALINE HYDROCHLORIDE 100 MG/1
100 TABLET, FILM COATED ORAL 2 TIMES DAILY
Qty: 180 TABLET | Refills: 3 | Status: SHIPPED | OUTPATIENT
Start: 2024-09-11

## 2024-09-11 NOTE — PROGRESS NOTES
Andres is a 74 year old who is being evaluated via a billable telephone visit.    What phone number would you like to be contacted at? [phone]  How would you like to obtain your AVS? Emilia  Originating Location (pt. Location): Home    Distant Location (provider location):  On-site    Assessment & Plan     Benign essential hypertension  Refilled medications, check blood pressure when he returns for a scheduled visit in January  - amLODIPine (NORVASC) 5 MG tablet; Take 1 tablet (5 mg) by mouth daily.  - lisinopril (ZESTRIL) 10 MG tablet; Take 1 tablet (10 mg) by mouth daily.    Hyperlipidemia LDL goal <130  On statin therapy, check lipids in January  - atorvastatin (LIPITOR) 20 MG tablet; Take 1 tablet (20 mg) by mouth daily.    Recurrent major depressive disorder, in full remission (H24)  Seems stable, continue current medications, follow-up in January  - buPROPion (WELLBUTRIN XL) 150 MG 24 hr tablet; Take 1 tablet (150 mg) by mouth every morning.  - sertraline (ZOLOFT) 100 MG tablet; Take 1 tablet (100 mg) by mouth 2 times daily.    Moderate persistent asthma without complication  Stable, refilled medications, follow-up in January  - mometasone-formoterol (DULERA) 200-5 MCG/ACT inhaler; USE 2 INHALATIONS TWICE A DAY (DUE FOR A FOLLOW UP IN CLINIC, CALL [phone] TO SCHEDULE)  - albuterol (PROAIR HFA/PROVENTIL HFA/VENTOLIN HFA) 108 (90 Base) MCG/ACT inhaler; USE 2 INHALATIONS EVERY 6 HOURS    Subclinical hypothyroidism  On levothyroxine for hopeful cognitive benefits, check thyroid function test in January  - levothyroxine (SYNTHROID/LEVOTHROID) 50 MCG tablet; Take 1 tablet (50 mcg) by mouth daily.    Benign prostatic hyperplasia with lower urinary tract symptoms, symptom details unspecified  Continue Flomax he thinks he was advised by specialists to stop oxybutynin so he has not been taking that medication.  It probably is not a great choice for him since the anticholinergic could make memory problems worse.     - tamsulosin (FLOMAX) 0.4 MG capsule; Take 1 capsule (0.4 mg) by mouth daily.            FUTURE APPOINTMENTS:       - Follow-up for annual visit or as needed    Subjective   Andres is a 74 year old, presenting for the following health issues:  Recheck Medication    History of Present Illness       Reason for visit:  Renew prescription and set up physical for this year.   He is taking medications regularly.     Andres is switching pharmacies and needs new prescription sent to a new pharmacy  Overall, he feels well, it seems that he was fitted with an AFO brace and this has helped him, he got his hip replaced and this has helped as well  He does not feel like his memory is worsening and thinks that the higher dose of thyroid medication is really helping  Neuropsychological testing suggested mild cognitive impairment  We confirmed his medication list      Objective           Vitals:  No vitals were obtained today due to virtual visit.    Physical Exam   General: Alert and no distress //Respiratory: No audible wheeze, cough, or shortness of breath // Psychiatric:  Appropriate affect, tone, and pace of words            Phone call duration: 13:16 minutes  Signed Electronically by: Romeo Fonseca MD

## 2024-09-12 ENCOUNTER — TRANSFERRED RECORDS (OUTPATIENT)
Dept: HEALTH INFORMATION MANAGEMENT | Facility: CLINIC | Age: 74
End: 2024-09-12
Payer: COMMERCIAL

## 2024-12-06 NOTE — TELEPHONE ENCOUNTER
Patient Education Prescription approved per [facility] Refill Protocol.  Peri PAULSON RN

## 2025-01-02 SDOH — HEALTH STABILITY: PHYSICAL HEALTH: ON AVERAGE, HOW MANY DAYS PER WEEK DO YOU ENGAGE IN MODERATE TO STRENUOUS EXERCISE (LIKE A BRISK WALK)?: 5 DAYS

## 2025-01-02 SDOH — HEALTH STABILITY: PHYSICAL HEALTH: ON AVERAGE, HOW MANY MINUTES DO YOU ENGAGE IN EXERCISE AT THIS LEVEL?: 40 MIN

## 2025-01-02 ASSESSMENT — ASTHMA QUESTIONNAIRES
ACT_TOTALSCORE: 22
ACT_TOTALSCORE: 22
QUESTION_5 LAST FOUR WEEKS HOW WOULD YOU RATE YOUR ASTHMA CONTROL: WELL CONTROLLED
QUESTION_2 LAST FOUR WEEKS HOW OFTEN HAVE YOU HAD SHORTNESS OF BREATH: THREE TO SIX TIMES A WEEK
QUESTION_1 LAST FOUR WEEKS HOW MUCH OF THE TIME DID YOUR ASTHMA KEEP YOU FROM GETTING AS MUCH DONE AT WORK, SCHOOL OR AT HOME: NONE OF THE TIME
QUESTION_4 LAST FOUR WEEKS HOW OFTEN HAVE YOU USED YOUR RESCUE INHALER OR NEBULIZER MEDICATION (SUCH AS ALBUTEROL): NOT AT ALL
QUESTION_3 LAST FOUR WEEKS HOW OFTEN DID YOUR ASTHMA SYMPTOMS (WHEEZING, COUGHING, SHORTNESS OF BREATH, CHEST TIGHTNESS OR PAIN) WAKE YOU UP AT NIGHT OR EARLIER THAN USUAL IN THE MORNING: NOT AT ALL

## 2025-01-02 ASSESSMENT — SOCIAL DETERMINANTS OF HEALTH (SDOH): HOW OFTEN DO YOU GET TOGETHER WITH FRIENDS OR RELATIVES?: ONCE A WEEK

## 2025-01-05 ASSESSMENT — PATIENT HEALTH QUESTIONNAIRE - PHQ9
10. IF YOU CHECKED OFF ANY PROBLEMS, HOW DIFFICULT HAVE THESE PROBLEMS MADE IT FOR YOU TO DO YOUR WORK, TAKE CARE OF THINGS AT HOME, OR GET ALONG WITH OTHER PEOPLE: NOT DIFFICULT AT ALL
SUM OF ALL RESPONSES TO PHQ QUESTIONS 1-9: 1
SUM OF ALL RESPONSES TO PHQ QUESTIONS 1-9: 1

## 2025-01-06 ENCOUNTER — OFFICE VISIT (OUTPATIENT)
Dept: FAMILY MEDICINE | Facility: CLINIC | Age: 75
End: 2025-01-06
Payer: COMMERCIAL

## 2025-01-06 VITALS
HEART RATE: 79 BPM | BODY MASS INDEX: 27.11 KG/M2 | HEIGHT: 69 IN | SYSTOLIC BLOOD PRESSURE: 124 MMHG | OXYGEN SATURATION: 96 % | WEIGHT: 183 LBS | TEMPERATURE: 96.9 F | DIASTOLIC BLOOD PRESSURE: 70 MMHG | RESPIRATION RATE: 18 BRPM

## 2025-01-06 DIAGNOSIS — D47.2 MGUS (MONOCLONAL GAMMOPATHY OF UNKNOWN SIGNIFICANCE): ICD-10-CM

## 2025-01-06 DIAGNOSIS — N18.32 STAGE 3B CHRONIC KIDNEY DISEASE (H): ICD-10-CM

## 2025-01-06 DIAGNOSIS — N40.1 BENIGN PROSTATIC HYPERPLASIA WITH URINARY FREQUENCY: ICD-10-CM

## 2025-01-06 DIAGNOSIS — E53.8 VITAMIN B12 DEFICIENCY (NON ANEMIC): ICD-10-CM

## 2025-01-06 DIAGNOSIS — E78.5 HYPERLIPIDEMIA LDL GOAL <70: ICD-10-CM

## 2025-01-06 DIAGNOSIS — Z12.5 SCREENING FOR PROSTATE CANCER: ICD-10-CM

## 2025-01-06 DIAGNOSIS — R35.0 BENIGN PROSTATIC HYPERPLASIA WITH URINARY FREQUENCY: ICD-10-CM

## 2025-01-06 DIAGNOSIS — Z29.11 NEED FOR VACCINATION AGAINST RESPIRATORY SYNCYTIAL VIRUS: ICD-10-CM

## 2025-01-06 DIAGNOSIS — B00.1 RECURRENT COLD SORES: ICD-10-CM

## 2025-01-06 DIAGNOSIS — I10 BENIGN ESSENTIAL HYPERTENSION: ICD-10-CM

## 2025-01-06 DIAGNOSIS — Z00.00 ENCOUNTER FOR MEDICARE ANNUAL WELLNESS EXAM: Primary | ICD-10-CM

## 2025-01-06 DIAGNOSIS — E03.9 HYPOTHYROIDISM, UNSPECIFIED TYPE: ICD-10-CM

## 2025-01-06 DIAGNOSIS — J45.40 MODERATE PERSISTENT ASTHMA WITHOUT COMPLICATION: ICD-10-CM

## 2025-01-06 DIAGNOSIS — F33.42 RECURRENT MAJOR DEPRESSIVE DISORDER, IN FULL REMISSION: ICD-10-CM

## 2025-01-06 PROBLEM — R94.6 BORDERLINE ABNORMAL TFTS: Status: RESOLVED | Noted: 2022-11-21 | Resolved: 2025-01-06

## 2025-01-06 LAB
ALBUMIN SERPL BCG-MCNC: 4.5 G/DL (ref 3.5–5.2)
ALP SERPL-CCNC: 110 U/L (ref 40–150)
ALT SERPL W P-5'-P-CCNC: 27 U/L (ref 0–70)
ANION GAP SERPL CALCULATED.3IONS-SCNC: 12 MMOL/L (ref 7–15)
AST SERPL W P-5'-P-CCNC: 28 U/L (ref 0–45)
BILIRUB SERPL-MCNC: 0.3 MG/DL
BUN SERPL-MCNC: 26.1 MG/DL (ref 8–23)
CALCIUM SERPL-MCNC: 9.3 MG/DL (ref 8.8–10.4)
CHLORIDE SERPL-SCNC: 105 MMOL/L (ref 98–107)
CHOLEST SERPL-MCNC: 156 MG/DL
CREAT SERPL-MCNC: 1.66 MG/DL (ref 0.67–1.17)
CREAT UR-MCNC: 196 MG/DL
EGFRCR SERPLBLD CKD-EPI 2021: 43 ML/MIN/1.73M2
ERYTHROCYTE [DISTWIDTH] IN BLOOD BY AUTOMATED COUNT: 13.6 % (ref 10–15)
EST. AVERAGE GLUCOSE BLD GHB EST-MCNC: 117 MG/DL
FASTING STATUS PATIENT QL REPORTED: YES
FASTING STATUS PATIENT QL REPORTED: YES
GLUCOSE SERPL-MCNC: 137 MG/DL (ref 70–99)
HBA1C MFR BLD: 5.7 % (ref 0–5.6)
HCO3 SERPL-SCNC: 20 MMOL/L (ref 22–29)
HCT VFR BLD AUTO: 41 % (ref 40–53)
HDLC SERPL-MCNC: 53 MG/DL
HGB BLD-MCNC: 13.6 G/DL (ref 13.3–17.7)
LDLC SERPL CALC-MCNC: 69 MG/DL
MCH RBC QN AUTO: 31.2 PG (ref 26.5–33)
MCHC RBC AUTO-ENTMCNC: 33.2 G/DL (ref 31.5–36.5)
MCV RBC AUTO: 94 FL (ref 78–100)
MICROALBUMIN UR-MCNC: 58.9 MG/L
MICROALBUMIN/CREAT UR: 30.05 MG/G CR (ref 0–17)
NONHDLC SERPL-MCNC: 103 MG/DL
PLATELET # BLD AUTO: 259 10E3/UL (ref 150–450)
POTASSIUM SERPL-SCNC: 4.2 MMOL/L (ref 3.4–5.3)
PROT SERPL-MCNC: 7.1 G/DL (ref 6.4–8.3)
PSA SERPL DL<=0.01 NG/ML-MCNC: 3.17 NG/ML (ref 0–6.5)
RBC # BLD AUTO: 4.36 10E6/UL (ref 4.4–5.9)
SODIUM SERPL-SCNC: 137 MMOL/L (ref 135–145)
TOTAL PROTEIN SERUM FOR ELP: 7 G/DL (ref 6.4–8.3)
TRIGL SERPL-MCNC: 172 MG/DL
TSH SERPL DL<=0.005 MIU/L-ACNC: 2.87 UIU/ML (ref 0.3–4.2)
VIT B12 SERPL-MCNC: 1431 PG/ML (ref 232–1245)
WBC # BLD AUTO: 6.5 10E3/UL (ref 4–11)

## 2025-01-06 PROCEDURE — 80061 LIPID PANEL: CPT | Performed by: INTERNAL MEDICINE

## 2025-01-06 PROCEDURE — G0103 PSA SCREENING: HCPCS | Performed by: INTERNAL MEDICINE

## 2025-01-06 PROCEDURE — 82570 ASSAY OF URINE CREATININE: CPT | Performed by: INTERNAL MEDICINE

## 2025-01-06 PROCEDURE — 83036 HEMOGLOBIN GLYCOSYLATED A1C: CPT | Mod: GZ | Performed by: INTERNAL MEDICINE

## 2025-01-06 PROCEDURE — 80053 COMPREHEN METABOLIC PANEL: CPT | Performed by: INTERNAL MEDICINE

## 2025-01-06 PROCEDURE — 84165 PROTEIN E-PHORESIS SERUM: CPT | Performed by: STUDENT IN AN ORGANIZED HEALTH CARE EDUCATION/TRAINING PROGRAM

## 2025-01-06 PROCEDURE — 90480 ADMN SARSCOV2 VAC 1/ONLY CMP: CPT | Performed by: INTERNAL MEDICINE

## 2025-01-06 PROCEDURE — G0439 PPPS, SUBSEQ VISIT: HCPCS | Performed by: INTERNAL MEDICINE

## 2025-01-06 PROCEDURE — 91320 SARSCV2 VAC 30MCG TRS-SUC IM: CPT | Performed by: INTERNAL MEDICINE

## 2025-01-06 PROCEDURE — 84155 ASSAY OF PROTEIN SERUM: CPT | Mod: 59 | Performed by: INTERNAL MEDICINE

## 2025-01-06 PROCEDURE — 85027 COMPLETE CBC AUTOMATED: CPT | Performed by: INTERNAL MEDICINE

## 2025-01-06 PROCEDURE — 36415 COLL VENOUS BLD VENIPUNCTURE: CPT | Performed by: INTERNAL MEDICINE

## 2025-01-06 PROCEDURE — 84443 ASSAY THYROID STIM HORMONE: CPT | Performed by: INTERNAL MEDICINE

## 2025-01-06 PROCEDURE — 82043 UR ALBUMIN QUANTITATIVE: CPT | Performed by: INTERNAL MEDICINE

## 2025-01-06 PROCEDURE — 82607 VITAMIN B-12: CPT | Performed by: INTERNAL MEDICINE

## 2025-01-06 RX ORDER — VALACYCLOVIR HYDROCHLORIDE 1 G/1
1000 TABLET, FILM COATED ORAL 2 TIMES DAILY
Qty: 4 TABLET | Refills: 11 | Status: SHIPPED | OUTPATIENT
Start: 2025-01-06

## 2025-01-06 RX ORDER — FEXOFENADINE HCL 180 MG/1
TABLET ORAL
COMMUNITY
Start: 2024-11-20

## 2025-01-06 RX ORDER — SULFACETAMIDE SODIUM AND PREDNISOLONE SODIUM PHOSPHATE 100; 2.3 MG/ML; MG/ML
SOLUTION/ DROPS OPHTHALMIC
COMMUNITY
Start: 2024-11-20

## 2025-01-06 RX ORDER — FLUTICASONE PROPIONATE 50 MCG
SPRAY, SUSPENSION (ML) NASAL
COMMUNITY
Start: 2024-11-20

## 2025-01-06 ASSESSMENT — PAIN SCALES - GENERAL: PAINLEVEL_OUTOF10: NO PAIN (0)

## 2025-01-06 NOTE — PROGRESS NOTES
"Preventive Care Visit  [facility]  Romeo Fonseca MD, Internal Medicine  Jan 6, 2025      Assessment & Plan     Encounter for Medicare annual wellness exam    - HEMOGLOBIN A1C; Future  - Comprehensive metabolic panel; Future  - CBC with platelets; Future    Stage 3b chronic kidney disease (H)  Recheck renal labs   - Albumin Random Urine Quantitative with Creat Ratio; Future  - Hemoglobin; Future    Recurrent major depressive disorder, in full remission (H)  Stable mood on wellbutrin and zoloft    Moderate persistent asthma without complication  Stable     Benign essential hypertension  OK, continue amlodipine and lisinopril     Hyperlipidemia LDL goal <70  On statin therapy; recheck   - Lipid panel reflex to direct LDL Non-fasting; Future    Hypothyroidism, unspecified type  Rechekc TFTs  - TSH WITH FREE T4 REFLEX; Future    Benign prostatic hyperplasia with urinary frequency  Stable LUTS    MGUS (monoclonal gammopathy of unknown significance)  Monoclonal spike noted on evaluate for possible reversible causes for decline in renal function  Renal function has improved, no other overt signs of myeloma     Recurrent cold sores    - valACYclovir (VALTREX) 1000 mg tablet; Take 1 tablet (1,000 mg) by mouth 2 times daily.    Need for vaccination against respiratory syncytial virus  Recommended that he get this at pharmacy    Screening for prostate cancer     - PROSTATE SPEC ANTIGEN SCREEN; Future    Vitamin B12 deficiency (non anemic)  Recheck   - Vitamin B12; Future    Patient has been advised of split billing requirements and indicates understanding: Yes        BMI  Estimated body mass index is 27.02 kg/m  as calculated from the following:    Height as of this encounter: 1.753 m (5' 9\").    Weight as of this encounter: 83 kg (183 lb).   Weight management plan: Discussed healthy diet and exercise guidelines    Counseling  Appropriate preventive services were addressed with this patient via screening, " questionnaire, or discussion as appropriate for fall prevention, nutrition, physical activity, Tobacco-use cessation, social engagement, weight loss and cognition.  Checklist reviewing preventive services available has been given to the patient.  Reviewed patient's diet, addressing concerns and/or questions.   The patient was provided with written information regarding signs of hearing loss.   Information on urinary incontinence and treatment options given to patient.   COVID shot today     FUTURE APPOINTMENTS:       - Follow-up for annual visit or as needed    Luciano Campos is a 74 year old, presenting for the following:  Physical        1/6/2025     8:10 AM   Additional Questions   Roomed by Caitlyn GARCIA MA           HPI          Health Care Directive  Patient does not have a Health Care Directive: Patient states has Advance Directive and will bring in a copy to clinic.      1/2/2025   General Health   How would you rate your overall physical health? Good   Feel stress (tense, anxious, or unable to sleep) Not at all         1/2/2025   Nutrition   Diet: Regular (no restrictions)         1/2/2025   Exercise   Days per week of moderate/strenous exercise 5 days   Average minutes spent exercising at this level 40 min         1/2/2025   Social Factors   Frequency of gathering with friends or relatives Once a week   Worry food won't last until get money to buy more No   Food not last or not have enough money for food? No   Do you have housing? (Housing is defined as stable permanent housing and does not include staying ouside in a car, in a tent, in an abandoned building, in an overnight shelter, or couch-surfing.) Yes   Are you worried about losing your housing? No   Lack of transportation? No   Unable to get utilities (heat,electricity)? No         1/6/2025   Fall Risk   Gait Speed Test (Document in seconds) 4.52   Gait Speed Test Interpretation Less than or equal to 5.00 seconds - PASS          1/2/2025   Activities of  Daily Living- Home Safety   Needs help with the following daily activites None of the above   Safety concerns in the home None of the above         1/2/2025   Dental   Dentist two times every year? Yes         1/2/2025   Hearing Screening   Hearing concerns? (!) I NEED TO ASK PEOPLE TO SPEAK UP OR REPEAT THEMSELVES.    (!) IT'S HARDER TO UNDERSTAND WOMEN'S VOICES THAN MEN'S VOICES.    (!) IT'S HARD TO FOLLOW A CONVERSATION IN A NOISY RESTAURANT OR CROWDED ROOM.    (!) TROUBLE UNDERSTANDING SOFT OR WHISPERED SPEECH.       Multiple values from one day are sorted in reverse-chronological order         1/2/2025   Driving Risk Screening   Patient/family members have concerns about driving No         1/2/2025   General Alertness/Fatigue Screening   Have you been more tired than usual lately? No         1/2/2025   Urinary Incontinence Screening   Bothered by leaking urine in past 6 months Yes         1/2/2025   TB Screening   Were you born outside of the [place]? No       Today's PHQ-9 Score:       1/5/2025     5:54 PM   PHQ-9 SCORE   PHQ-9 Total Score MyChart 1 (Minimal depression)   PHQ-9 Total Score 1        Patient-reported         1/2/2025   Substance Use   Alcohol more than 3/day or more than 7/wk No   Do you have a current opioid prescription? No   How severe/bad is pain from 1 to 10? 2/10   Do you use any other substances recreationally? No     Social History     Tobacco Use    Smoking status: Never    Smokeless tobacco: Never   Vaping Use    Vaping status: Never Used   Substance Use Topics    Alcohol use: Yes     Alcohol/week: 3.0 - 4.0 standard drinks of alcohol     Comment: 3 drinks on weekend days    Drug use: No           1/2/2025   AAA Screening   Family history of Abdominal Aortic Aneurysm (AAA)? No   ASCVD Risk   The ASCVD Risk score (Anay CENTENO, et al., 2019) failed to calculate for the following reasons:    Risk score cannot be calculated because patient has a medical history suggesting prior/existing  ASCVD            Reviewed and updated as needed this visit by Provider                    Past Medical History:   Diagnosis Date    Arthritis     BPH (benign prostatic hyperplasia)     Cryptogenic stroke (H) 08/16/2022    Depression     Depressive disorder 2005    History of spinal cord injury     Hypertension     Mumps     Non morbid obesity due to excess calories 11/09/2016    Recurrent major depressive disorder, in full remission (H) 11/09/2016    Thyroid disease     Uncomplicated asthma      Past Surgical History:   Procedure Laterality Date    BACK SURGERY      cervical fusion,neck fx repair    COLONOSCOPY      CYSTOSCOPY      CYSTOSCOPY, RETROGRADES, INSERT STENT URETER(S), COMBINED Left 12/16/2022    Procedure: CYSTOSCOPY, WITH RETROGRADE PYELOGRAM AND LEFT URETERAL STENT INSERTION;  Surgeon: Wisam Eugene MD;  Location:  OR    CYSTOSCOPY, TRANSURETHRAL RESECTION (TUR) PROSTATE, COMBINED N/A 04/03/2015    Procedure: COMBINED CYSTOSCOPY, TRANSURETHRAL RESECTION (TUR) PROSTATE;  Surgeon: Bakari Barrios MD;  Location:  OR    ENT SURGERY      SINUS SURGERY X 3,SEPTOPLASTY,TONSILLECTOMY    EYE SURGERY  11/10/2017    FOOT SURGERY Right     GENITOURINARY SURGERY      TURP IN 2010    HEAD & NECK SURGERY  10/15/2017    PROSTATE SURGERY      TONSILLECTOMY       BP Readings from Last 3 Encounters:   01/06/25 124/70   01/02/24 (!) 155/75   12/28/23 (!) 149/69    Wt Readings from Last 3 Encounters:   01/06/25 83 kg (183 lb)   01/02/24 84.8 kg (187 lb)   12/28/23 82.1 kg (181 lb)                  Patient Active Problem List   Diagnosis    Benign prostatic hyperplasia with urinary frequency    Hematuria    Benign essential hypertension    Seasonal allergic rhinitis    Moderate persistent asthma without complication    Vasculogenic erectile dysfunction    Herpes zoster without complication    Recurrent major depressive disorder, in full remission (H)    Non morbid obesity due to excess calories    Hyperlipidemia LDL  goal <70    IFG (impaired fasting glucose)    Cryptogenic stroke (H)    Memory loss    Vitamin B12 deficiency (non anemic)    Hypothyroidism, unspecified type    Stage 3b chronic kidney disease (H)    MGUS (monoclonal gammopathy of unknown significance)     Past Surgical History:   Procedure Laterality Date    BACK SURGERY      cervical fusion,neck fx repair    COLONOSCOPY      CYSTOSCOPY      CYSTOSCOPY, RETROGRADES, INSERT STENT URETER(S), COMBINED Left 12/16/2022    Procedure: CYSTOSCOPY, WITH RETROGRADE PYELOGRAM AND LEFT URETERAL STENT INSERTION;  Surgeon: Wisam Eugene MD;  Location:  OR    CYSTOSCOPY, TRANSURETHRAL RESECTION (TUR) PROSTATE, COMBINED N/A 04/03/2015    Procedure: COMBINED CYSTOSCOPY, TRANSURETHRAL RESECTION (TUR) PROSTATE;  Surgeon: Bakari Barrios MD;  Location:  OR    ENT SURGERY      SINUS SURGERY X 3,SEPTOPLASTY,TONSILLECTOMY    EYE SURGERY  11/10/2017    FOOT SURGERY Right     GENITOURINARY SURGERY      TURP IN 2010    HEAD & NECK SURGERY  10/15/2017    PROSTATE SURGERY      TONSILLECTOMY         Social History     Tobacco Use    Smoking status: Never    Smokeless tobacco: Never   Substance Use Topics    Alcohol use: Yes     Alcohol/week: 3.0 - 4.0 standard drinks of alcohol     Comment: 3 drinks on weekend days     Family History   Problem Relation Age of Onset    Colon Cancer Father 70    Coronary Artery Disease No family hx of     Cerebrovascular Disease No family hx of     Diabetes No family hx of          Current Outpatient Medications   Medication Sig Dispense Refill    albuterol (PROAIR HFA/PROVENTIL HFA/VENTOLIN HFA) 108 (90 Base) MCG/ACT inhaler USE 2 INHALATIONS EVERY 6 HOURS 8.5 g 11    amLODIPine (NORVASC) 5 MG tablet Take 1 tablet (5 mg) by mouth daily. 90 tablet 3    aspirin (ASA) 81 MG EC tablet Take 1 tablet (81 mg) by mouth daily      atorvastatin (LIPITOR) 20 MG tablet Take 1 tablet (20 mg) by mouth daily. 90 tablet 3    buPROPion (WELLBUTRIN XL) 150 MG 24 hr tablet  Take 1 tablet (150 mg) by mouth every morning. 90 tablet 3    Coenzyme Q10 300 MG CAPS Take 300 mg by mouth      cyanocobalamin (VITAMIN B-12) 1000 MCG tablet Take 1,000 mcg by mouth      fexofenadine (ALLEGRA) 180 MG tablet       fluticasone (FLONASE) 50 MCG/ACT nasal spray       levothyroxine (SYNTHROID/LEVOTHROID) 50 MCG tablet Take 1 tablet (50 mcg) by mouth daily. 90 tablet 3    lisinopril (ZESTRIL) 10 MG tablet Take 1 tablet (10 mg) by mouth daily. 90 tablet 3    mometasone-formoterol (DULERA) 200-5 MCG/ACT inhaler USE 2 INHALATIONS TWICE A DAY (DUE FOR A FOLLOW UP IN CLINIC, CALL [phone] TO SCHEDULE) 39 g 3    multivitamin w/minerals (MULTI-VITAMIN) tablet Take 1 tablet by mouth daily      sertraline (ZOLOFT) 100 MG tablet Take 1 tablet (100 mg) by mouth 2 times daily. 180 tablet 3    sulfacetamide-prednisoLONE (VASOCIDIN) 10-0.23 % ophthalmic solution       tamsulosin (FLOMAX) 0.4 MG capsule Take 1 capsule (0.4 mg) by mouth daily. 90 capsule 3    valACYclovir (VALTREX) 1000 mg tablet Take 1 tablet (1,000 mg) by mouth 2 times daily. 4 tablet 11    vitamin D3 (CHOLECALCIFEROL) 125 MCG (5000 UT) tablet Take 15,000 Units by mouth      Vitamin E (VITAMIN E/D-ALPHA NATURAL) 268 MG (400 UNIT) CAPS Take 180 mg by mouth daily      mupirocin (BACTROBAN) 2 % external ointment        Current providers sharing in care for this patient include:  Patient Care Team:  Romeo Fonseca MD as PCP - General (Internal Medicine)  Romeo Fonseca MD as Assigned PCP  Wisam Eugene MD as MD (Urology)  Jhonny Bhatti MD as MD (Nephrology)  Nolberto Up MD as MD (Hematology & Oncology)    The following health maintenance items are reviewed in Epic and correct as of today:  Health Maintenance   Topic Date Due    DEPRESSION ACTION PLAN  Never done    RSV VACCINE (1 - Risk 60-74 years 1-dose series) Never done    ASTHMA ACTION PLAN  09/13/2018    MICROALBUMIN  01/10/2024    COVID-19 Vaccine (6 - 2024-25 season) 09/01/2024     "BMP  12/28/2024    LIPID  12/28/2024    ANNUAL REVIEW OF HM ORDERS  01/02/2025    TSH W/FREE T4 REFLEX  12/28/2024    HEMOGLOBIN  12/28/2024    MEDICARE ANNUAL WELLNESS VISIT  01/02/2025    ASTHMA CONTROL TEST  07/06/2025    PHQ-9  07/06/2025    FALL RISK ASSESSMENT  01/06/2026    GLUCOSE  12/28/2026    ADVANCE CARE PLANNING  12/29/2027    COLORECTAL CANCER SCREENING  12/14/2028    DTAP/TDAP/TD IMMUNIZATION (4 - Td or Tdap) 01/02/2034    PARATHYROID  Completed    PHOSPHORUS  Completed    HEPATITIS C SCREENING  Completed    INFLUENZA VACCINE  Completed    Pneumococcal Vaccine: 50+ Years  Completed    URINALYSIS  Completed    ALK PHOS  Completed    ZOSTER IMMUNIZATION  Completed    HPV IMMUNIZATION  Aged Out    MENINGITIS IMMUNIZATION  Aged Out    RSV MONOCLONAL ANTIBODY  Aged Out       A 10 organ systems ROS is negative other than any pertinent positives or negatives previously stated.      Objective    Exam  /70   Pulse 79   Temp 96.9  F (36.1  C) (Temporal)   Resp 18   Ht 1.753 m (5' 9\")   Wt 83 kg (183 lb)   SpO2 96%   BMI 27.02 kg/m     Estimated body mass index is 27.02 kg/m  as calculated from the following:    Height as of this encounter: 1.753 m (5' 9\").    Weight as of this encounter: 83 kg (183 lb).    Physical Exam  GENERAL: alert and no distress  EYES: Eyes grossly normal to inspection, PERRL and conjunctivae and sclerae normal  HENT: ear canals and TM's normal, nose and mouth without ulcers or lesions  NECK: no adenopathy, no asymmetry, masses, or scars  RESP: lungs clear to auscultation - no rales, rhonchi or wheezes  CV: regular rate and rhythm, normal S1 S2, no S3 or S4, no murmur, click or rub, no peripheral edema  ABDOMEN: soft, nontender, no hepatosplenomegaly, no masses and bowel sounds normal  MS: no gross musculoskeletal defects noted, no edema  SKIN: no suspicious lesions or rashes  NEURO: Normal strength and tone, mentation intact and speech normal  PSYCH: mentation appears " normal, affect normal/bright         1/6/2025   Mini Cog   Clock Draw Score 2 Normal   3 Item Recall 2 objects recalled   Mini Cog Total Score 4              Signed Electronically by: Romeo Fonseca MD

## 2025-01-06 NOTE — PATIENT INSTRUCTIONS
You should get the RSV (Respiratory Syncytial Virus) vaccine at a pharmacy.       Patient Education   Preventive Care Advice   This is general advice given by our system to help you stay healthy. However, your care team may have specific advice just for you. Please talk to your care team about your preventive care needs.  Nutrition  Eat 5 or more servings of fruits and vegetables each day.  Try wheat bread, brown rice and whole grain pasta (instead of white bread, rice, and pasta).  Get enough calcium and vitamin D. Check the label on foods and aim for 100% of the RDA (recommended daily allowance).  Lifestyle  Exercise at least 150 minutes each week  (30 minutes a day, 5 days a week).  Do muscle strengthening activities 2 days a week. These help control your weight and prevent disease.  No smoking.  Wear sunscreen to prevent skin cancer.  Have a dental exam and cleaning every 6 months.  Yearly exams  See your health care team every year to talk about:  Any changes in your health.  Any medicines your care team has prescribed.  Preventive care, family planning, and ways to prevent chronic diseases.  Shots (vaccines)   HPV shots (up to age 26), if you've never had them before.  Hepatitis B shots (up to age 59), if you've never had them before.  COVID-19 shot: Get this shot when it's due.  Flu shot: Get a flu shot every year.  Tetanus shot: Get a tetanus shot every 10 years.  Pneumococcal, hepatitis A, and RSV shots: Ask your care team if you need these based on your risk.  Shingles shot (for age 50 and up)  General health tests  Diabetes screening:  Starting at age 35, Get screened for diabetes at least every 3 years.  If you are younger than age 35, ask your care team if you should be screened for diabetes.  Cholesterol test: At age 39, start having a cholesterol test every 5 years, or more often if advised.  Bone density scan (DEXA): At age 50, ask your care team if you should have this scan for osteoporosis (brittle  bones).  Hepatitis C: Get tested at least once in your life.  STIs (sexually transmitted infections)  Before age 24: Ask your care team if you should be screened for STIs.  After age 24: Get screened for STIs if you're at risk. You are at risk for STIs (including HIV) if:  You are sexually active with more than one person.  You don't use condoms every time.  You or a partner was diagnosed with a sexually transmitted infection.  If you are at risk for HIV, ask about PrEP medicine to prevent HIV.  Get tested for HIV at least once in your life, whether you are at risk for HIV or not.  Cancer screening tests  Cervical cancer screening: If you have a cervix, begin getting regular cervical cancer screening tests starting at age 21.  Breast cancer scan (mammogram): If you've ever had breasts, begin having regular mammograms starting at age 40. This is a scan to check for breast cancer.  Colon cancer screening: It is important to start screening for colon cancer at age 45.  Have a colonoscopy test every 10 years (or more often if you're at risk) Or, ask your provider about stool tests like a FIT test every year or Cologuard test every 3 years.  To learn more about your testing options, visit:   .  For help making a decision, visit:   https://bit.ly/iq86141.  Prostate cancer screening test: If you have a prostate, ask your care team if a prostate cancer screening test (PSA) at age 55 is right for you.  Lung cancer screening: If you are a current or former smoker ages 50 to 80, ask your care team if ongoing lung cancer screenings are right for you.  For informational purposes only. Not to replace the advice of your health care provider. Copyright   2023 [employer]. All rights reserved. Clinically reviewed by the [facility]. [employer] [id number] - REV 01/24.  Preventing Falls: Care Instructions  Injuries and health problems such as trouble walking or poor eyesight can increase your risk of  falling. So can some medicines. But there are things you can do to help prevent falls. You can exercise to get stronger. You can also arrange your home to make it safer.    Talk to your doctor about the medicines you take. Ask if any of them increase the risk of falls and whether they can be changed or stopped.   Try to exercise regularly. It can help improve your strength and balance. This can help lower your risk of falling.         Practice fall safety and prevention.   Wear low-heeled shoes that fit well and give your feet good support. Talk to your doctor if you have foot problems that make this hard.  Carry a cellphone or wear a medical alert device that you can use to call for help.  Use stepladders instead of chairs to reach high objects. Don't climb if you're at risk for falls. Ask for help, if needed.  Wear the correct eyeglasses, if you need them.        Make your home safer.   Remove rugs, cords, clutter, and furniture from walkways.  Keep your house well lit. Use night-lights in hallways and bathrooms.  Install and use sturdy handrails on stairways.  Wear nonskid footwear, even inside. Don't walk barefoot or in socks without shoes.        Be safe outside.   Use handrails, curb cuts, and ramps whenever possible.  Keep your hands free by using a shoulder bag or backpack.  Try to walk in well-lit areas. Watch out for uneven ground, changes in pavement, and debris.  Be careful in the winter. Walk on the grass or gravel when sidewalks are slippery. Use de-icer on steps and walkways. Add non-slip devices to shoes.    Put grab bars and nonskid mats in your shower or tub and near the toilet. Try to use a shower chair or bath bench when bathing.   Get into a tub or shower by putting in your weaker leg first. Get out with your strong side first. Have a phone or medical alert device in the bathroom with you.   Where can you learn more?  Go to https://www.[employer].net/patiented  Enter G117 in the search box to  "learn more about \"Preventing Falls: Care Instructions.\"  Current as of: July 31, 2024  Content Version: 14.3    2024 [email or web address].   Care instructions adapted under license by your healthcare professional. If you have questions about a medical condition or this instruction, always ask your healthcare professional. [email or web address] disclaims any warranty or liability for your use of this information.    Hearing Loss: Care Instructions  Overview     Hearing loss is a sudden or slow decrease in how well you hear. It can range from slight to profound. Permanent hearing loss can occur with aging. It also can happen when you are exposed long-term to loud noise. Examples include listening to loud music, riding motorcycles, or being around other loud machines.  Hearing loss can affect your work and home life. It can make you feel lonely or depressed. You may feel that you have lost your independence. But hearing aids and other devices can help you hear better and feel connected to others.  Follow-up care is a key part of your treatment and safety. Be sure to make and go to all appointments, and call your doctor if you are having problems. It's also a good idea to know your test results and keep a list of the medicines you take.  How can you care for yourself at home?  Avoid loud noises whenever possible. This helps keep your hearing from getting worse.  Always wear hearing protection around loud noises.  Wear a hearing aid as directed.  A professional can help you pick a hearing aid that will work best for you.  You can also get hearing aids over the counter for mild to moderate hearing loss.  Have hearing tests as your doctor suggests. They can show whether your hearing has changed. Your hearing aid may need to be adjusted.  Use other devices as needed. These may include:  Telephone amplifiers and hearing aids that can connect to a television, stereo, radio, or microphone.  Devices that use lights or " "vibrations. These alert you to the doorbell, a ringing telephone, or a baby monitor.  Television closed-captioning. This shows the words at the bottom of the screen. Most new TVs can do this.  TTY (text telephone). This lets you type messages back and forth on the telephone instead of talking or listening. These devices are also called TDD. When messages are typed on the keyboard, they are sent over the phone line to a receiving TTY. The message is shown on a monitor.  Use text messaging, social media, and email if it is hard for you to communicate by telephone.  Try to learn a listening technique called speechreading. It is not lipreading. You pay attention to people's gestures, expressions, posture, and tone of voice. These clues can help you understand what a person is saying. Face the person you are talking to, and have them face you. Make sure the lighting is good. You need to see the other person's face clearly.  Think about counseling if you need help to adjust to your hearing loss.  When should you call for help?  Watch closely for changes in your health, and be sure to contact your doctor if:    You think your hearing is getting worse.     You have new symptoms, such as dizziness or nausea.   Where can you learn more?  Go to https://www.[employer].net/patiented  Enter R798 in the search box to learn more about \"Hearing Loss: Care Instructions.\"  Current as of: September 27, 2023  Content Version: 14.3    2024 [employer].   Care instructions adapted under license by your healthcare professional. If you have questions about a medical condition or this instruction, always ask your healthcare professional. [employer] disclaims any warranty or liability for your use of this information.    Bladder Training: Care Instructions  Your Care Instructions     Bladder training is used to treat urge incontinence and stress incontinence. Urge incontinence means that the need to urinate comes on so " fast that you can't get to a toilet in time. Stress incontinence means that you leak urine because of pressure on your bladder. For example, it may happen when you laugh, cough, or lift something heavy.  Bladder training can increase how long you can wait before you have to urinate. It can also help your bladder hold more urine. And it can give you better control over the urge to urinate.  It is important to remember that bladder training takes a few weeks to a few months to make a difference. You may not see results right away, but don't give up.  Follow-up care is a key part of your treatment and safety. Be sure to make and go to all appointments, and call your doctor if you are having problems. It's also a good idea to know your test results and keep a list of the medicines you take.  How can you care for yourself at home?  Work with your doctor to come up with a bladder training program that is right for you. You may use one or more of the following methods.  Delayed urination  In the beginning, try to keep from urinating for 5 minutes after you first feel the need to go.  While you wait, take deep, slow breaths to relax. Kegel exercises can also help you delay the need to go to the bathroom.  After some practice, when you can easily wait 5 minutes to urinate, try to wait 10 minutes before you urinate.  Slowly increase the waiting period until you are able to control when you have to urinate.  Scheduled urination  Empty your bladder when you first wake up in the morning.  Schedule times throughout the day when you will urinate.  Start by going to the bathroom every hour, even if you don't need to go.  Slowly increase the time between trips to the bathroom.  When you have found a schedule that works well for you, keep doing it.  If you wake up during the night and have to urinate, do it. Apply your schedule to waking hours only.  Kegel exercises  These tighten and strengthen pelvic muscles, which can help you  "control the flow of urine. (If doing these exercises causes pain, stop doing them and talk with your doctor.) To do Kegel exercises:  Squeeze your muscles as if you were trying not to pass gas. Or squeeze your muscles as if you were stopping the flow of urine. Your belly, legs, and buttocks shouldn't move.  Hold the squeeze for 3 seconds, then relax for 5 to 10 seconds.  Start with 3 seconds, then add 1 second each week until you are able to squeeze for 10 seconds.  Repeat the exercise 10 times a session. Do 3 to 8 sessions a day.  When should you call for help?  Watch closely for changes in your health, and be sure to contact your doctor if:    Your incontinence is getting worse.     You do not get better as expected.   Where can you learn more?  Go to https://www.[employer].net/patiented  Enter V684 in the search box to learn more about \"Bladder Training: Care Instructions.\"  Current as of: April 30, 2024  Content Version: 14.3    2024 [employer].   Care instructions adapted under license by your healthcare professional. If you have questions about a medical condition or this instruction, always ask your healthcare professional. [employer] disclaims any warranty or liability for your use of this information.       "

## 2025-01-06 NOTE — RESULT ENCOUNTER NOTE
The following letter pertains to your most recent diagnostic tests:    -The kidney urine test urine microalbumin level is mildly elevated.  Taking lisinopril as you are can help make sure that this number does not increase over time.    -The cholesterol panel looks okay for you.  Watching starches and sugars in the diet can help with the triglyceride levels.    -Metabolic panel shows stable, moderately impaired kidney function (creatinine).  The Glucose (blood sugar) is elevated but it is not in the diabetic range (diabetes is defined as a fasting blood sugar reading greater than 126 on two separate occassions).      -The vitamin B12 level is adequate.    -TSH (thyroid stimulating hormone) level is normal which indicates normal circulating thyroid hormone levels.   Based on these numbers, I do not think any adjustment is needed for the levothyroxine (thyroid medication) dose.    -Your prostate specific antigen (PSA) test result returned normal and improved from last check 1 year ago.     -Your hemoglobin A1c test which averages your blood sugars over the last 3 months returned at 5.7 which demonstrates very mild prediabetes.  Again, watching starches and sugars in the diet can improve this number.  Also, increasing physical activity can get blood sugar down and prevent prediabetes from progressing to diabetes.    -Your complete blood counts including your hemoglobin returned normal for you.       Sincerely,    Dr. Fonseca

## 2025-01-07 LAB
ALBUMIN SERPL ELPH-MCNC: 4.6 G/DL (ref 3.7–5.1)
ALPHA1 GLOB SERPL ELPH-MCNC: 0.3 G/DL (ref 0.2–0.4)
ALPHA2 GLOB SERPL ELPH-MCNC: 0.7 G/DL (ref 0.5–0.9)
B-GLOBULIN SERPL ELPH-MCNC: 0.7 G/DL (ref 0.6–1)
GAMMA GLOB SERPL ELPH-MCNC: 0.8 G/DL (ref 0.7–1.6)
LOCATION OF TASK: ABNORMAL
M PROTEIN SERPL ELPH-MCNC: 0.1 G/DL
PROT PATTERN SERPL ELPH-IMP: ABNORMAL

## 2025-08-10 DIAGNOSIS — I10 BENIGN ESSENTIAL HYPERTENSION: ICD-10-CM

## 2025-08-11 RX ORDER — AMLODIPINE BESYLATE 5 MG/1
5 TABLET ORAL DAILY
Qty: 90 TABLET | Refills: 0 | Status: SHIPPED | OUTPATIENT
Start: 2025-08-11

## (undated) DEVICE — GUIDEWIRE SENSOR DUAL FLEX STR 0.035"X150CM M0066703080

## (undated) DEVICE — BAG DRAIN URO FOR SIEMENS 8MM ADAPTER NS CC164NS-A

## (undated) DEVICE — SOL NACL 0.9% IRRIG 3000ML BAG 2B7477

## (undated) DEVICE — PACK CYSTOSCOPY SBA15CYFSI

## (undated) DEVICE — PAD CHUX UNDERPAD 23X24" 7136

## (undated) DEVICE — SOL NACL 0.9% IRRIG 1000ML BOTTLE 2F7124

## (undated) DEVICE — CATH URETERAL FLEX TIP TIGERTAIL 06FRX70CM 139006

## (undated) RX ORDER — FUROSEMIDE 10 MG/ML
INJECTION INTRAMUSCULAR; INTRAVENOUS
Status: DISPENSED
Start: 2022-12-13

## (undated) RX ORDER — FENTANYL CITRATE 50 UG/ML
INJECTION, SOLUTION INTRAMUSCULAR; INTRAVENOUS
Status: DISPENSED
Start: 2022-12-16

## (undated) RX ORDER — PROPOFOL 10 MG/ML
INJECTION, EMULSION INTRAVENOUS
Status: DISPENSED
Start: 2022-12-16

## (undated) RX ORDER — EPHEDRINE SULFATE 50 MG/ML
INJECTION, SOLUTION INTRAMUSCULAR; INTRAVENOUS; SUBCUTANEOUS
Status: DISPENSED
Start: 2022-12-16